# Patient Record
Sex: MALE | Race: WHITE | NOT HISPANIC OR LATINO | Employment: FULL TIME | URBAN - NONMETROPOLITAN AREA
[De-identification: names, ages, dates, MRNs, and addresses within clinical notes are randomized per-mention and may not be internally consistent; named-entity substitution may affect disease eponyms.]

---

## 2022-01-11 ENCOUNTER — APPOINTMENT (OUTPATIENT)
Dept: GENERAL RADIOLOGY | Facility: HOSPITAL | Age: 52
End: 2022-01-11

## 2022-01-11 ENCOUNTER — HOSPITAL ENCOUNTER (INPATIENT)
Facility: HOSPITAL | Age: 52
LOS: 29 days | Discharge: NURSING, REHAB OR LONG-TERM CARE | End: 2022-02-09
Attending: INTERNAL MEDICINE | Admitting: INTERNAL MEDICINE

## 2022-01-11 DIAGNOSIS — U07.1 ACUTE RESPIRATORY DISTRESS SYNDROME (ARDS) DUE TO 2019 NOVEL CORONAVIRUS: ICD-10-CM

## 2022-01-11 DIAGNOSIS — A41.9 SHOCK, SEPTIC: Primary | ICD-10-CM

## 2022-01-11 DIAGNOSIS — U07.1 PNEUMONIA DUE TO COVID-19 VIRUS: ICD-10-CM

## 2022-01-11 DIAGNOSIS — J80 ACUTE RESPIRATORY DISTRESS SYNDROME (ARDS) DUE TO 2019 NOVEL CORONAVIRUS: ICD-10-CM

## 2022-01-11 DIAGNOSIS — Z99.11 VENTILATOR DEPENDENT: ICD-10-CM

## 2022-01-11 DIAGNOSIS — J12.82 PNEUMONIA DUE TO COVID-19 VIRUS: ICD-10-CM

## 2022-01-11 DIAGNOSIS — R65.21 SHOCK, SEPTIC: Primary | ICD-10-CM

## 2022-01-11 DIAGNOSIS — Z98.890 HX OF TRACHEOSTOMY: ICD-10-CM

## 2022-01-11 DIAGNOSIS — I26.99 ACUTE PULMONARY EMBOLISM WITHOUT ACUTE COR PULMONALE, UNSPECIFIED PULMONARY EMBOLISM TYPE: ICD-10-CM

## 2022-01-11 DIAGNOSIS — E43 SEVERE MALNUTRITION: ICD-10-CM

## 2022-01-11 PROBLEM — E66.9 OBESITY (BMI 30-39.9): Status: ACTIVE | Noted: 2022-01-11

## 2022-01-11 PROBLEM — J96.01 ACUTE HYPOXEMIC RESPIRATORY FAILURE: Status: ACTIVE | Noted: 2022-01-11

## 2022-01-11 LAB
ALBUMIN SERPL-MCNC: 2.9 G/DL (ref 3.5–5.2)
ALBUMIN/GLOB SERPL: 0.6 G/DL
ALP SERPL-CCNC: 95 U/L (ref 39–117)
ALT SERPL W P-5'-P-CCNC: 35 U/L (ref 1–41)
AMPHET+METHAMPHET UR QL: NEGATIVE
AMPHETAMINES UR QL: NEGATIVE
ANION GAP SERPL CALCULATED.3IONS-SCNC: 9 MMOL/L (ref 5–15)
ARTERIAL PATENCY WRIST A: ABNORMAL
ARTERIAL PATENCY WRIST A: ABNORMAL
AST SERPL-CCNC: 40 U/L (ref 1–40)
ATMOSPHERIC PRESS: 758 MMHG
ATMOSPHERIC PRESS: 759 MMHG
B PARAPERT DNA SPEC QL NAA+PROBE: NOT DETECTED
B PERT DNA SPEC QL NAA+PROBE: NOT DETECTED
BACTERIA UR QL AUTO: ABNORMAL /HPF
BARBITURATES UR QL SCN: NEGATIVE
BASE EXCESS BLDA CALC-SCNC: -4.2 MMOL/L (ref 0–2)
BASE EXCESS BLDA CALC-SCNC: -5.3 MMOL/L (ref 0–2)
BASOPHILS # BLD AUTO: 0.06 10*3/MM3 (ref 0–0.2)
BASOPHILS NFR BLD AUTO: 0.2 % (ref 0–1.5)
BDY SITE: ABNORMAL
BDY SITE: ABNORMAL
BENZODIAZ UR QL SCN: POSITIVE
BILIRUB SERPL-MCNC: 1.5 MG/DL (ref 0–1.2)
BILIRUB UR QL STRIP: NEGATIVE
BODY TEMPERATURE: 37 C
BODY TEMPERATURE: 37 C
BUN SERPL-MCNC: 22 MG/DL (ref 6–20)
BUN/CREAT SERPL: 18.3 (ref 7–25)
BUPRENORPHINE SERPL-MCNC: NEGATIVE NG/ML
C PNEUM DNA NPH QL NAA+NON-PROBE: NOT DETECTED
CALCIUM SPEC-SCNC: 8.2 MG/DL (ref 8.6–10.5)
CANNABINOIDS SERPL QL: NEGATIVE
CHLORIDE SERPL-SCNC: 99 MMOL/L (ref 98–107)
CK SERPL-CCNC: 102 U/L (ref 20–200)
CLARITY UR: CLEAR
CO2 SERPL-SCNC: 24 MMOL/L (ref 22–29)
COCAINE UR QL: NEGATIVE
COLOR UR: YELLOW
CREAT SERPL-MCNC: 1.2 MG/DL (ref 0.76–1.27)
CRP SERPL-MCNC: 29.82 MG/DL (ref 0–0.5)
D-LACTATE SERPL-SCNC: 1 MMOL/L (ref 0.5–2)
DEPRECATED RDW RBC AUTO: 46.7 FL (ref 37–54)
EOSINOPHIL # BLD AUTO: 0.01 10*3/MM3 (ref 0–0.4)
EOSINOPHIL NFR BLD AUTO: 0 % (ref 0.3–6.2)
ERYTHROCYTE [DISTWIDTH] IN BLOOD BY AUTOMATED COUNT: 15.8 % (ref 12.3–15.4)
FLUAV SUBTYP SPEC NAA+PROBE: NOT DETECTED
FLUBV RNA ISLT QL NAA+PROBE: NOT DETECTED
GFR SERPL CREATININE-BSD FRML MDRD: 64 ML/MIN/1.73
GLOBULIN UR ELPH-MCNC: 4.6 GM/DL
GLUCOSE SERPL-MCNC: 151 MG/DL (ref 65–99)
GLUCOSE UR STRIP-MCNC: NEGATIVE MG/DL
HADV DNA SPEC NAA+PROBE: NOT DETECTED
HCO3 BLDA-SCNC: 24.3 MMOL/L (ref 20–26)
HCO3 BLDA-SCNC: 25.3 MMOL/L (ref 20–26)
HCOV 229E RNA SPEC QL NAA+PROBE: NOT DETECTED
HCOV HKU1 RNA SPEC QL NAA+PROBE: NOT DETECTED
HCOV NL63 RNA SPEC QL NAA+PROBE: NOT DETECTED
HCOV OC43 RNA SPEC QL NAA+PROBE: NOT DETECTED
HCT VFR BLD AUTO: 38.4 % (ref 37.5–51)
HGB BLD-MCNC: 11.7 G/DL (ref 13–17.7)
HGB UR QL STRIP.AUTO: NEGATIVE
HMPV RNA NPH QL NAA+NON-PROBE: NOT DETECTED
HPIV1 RNA ISLT QL NAA+PROBE: NOT DETECTED
HPIV2 RNA SPEC QL NAA+PROBE: NOT DETECTED
HPIV3 RNA NPH QL NAA+PROBE: NOT DETECTED
HPIV4 P GENE NPH QL NAA+PROBE: NOT DETECTED
HYALINE CASTS UR QL AUTO: ABNORMAL /LPF
IMM GRANULOCYTES # BLD AUTO: 0.83 10*3/MM3 (ref 0–0.05)
IMM GRANULOCYTES NFR BLD AUTO: 3.1 % (ref 0–0.5)
INHALED O2 CONCENTRATION: 60 %
INHALED O2 CONCENTRATION: 80 %
KETONES UR QL STRIP: NEGATIVE
L PNEUMO1 AG UR QL IA: NEGATIVE
LEUKOCYTE ESTERASE UR QL STRIP.AUTO: NEGATIVE
LIPASE SERPL-CCNC: 29 U/L (ref 13–60)
LYMPHOCYTES # BLD AUTO: 1.73 10*3/MM3 (ref 0.7–3.1)
LYMPHOCYTES NFR BLD AUTO: 6.4 % (ref 19.6–45.3)
Lab: ABNORMAL
M PNEUMO IGG SER IA-ACNC: NOT DETECTED
MAGNESIUM SERPL-MCNC: 2.3 MG/DL (ref 1.6–2.6)
MCH RBC QN AUTO: 25.1 PG (ref 26.6–33)
MCHC RBC AUTO-ENTMCNC: 30.5 G/DL (ref 31.5–35.7)
MCV RBC AUTO: 82.4 FL (ref 79–97)
METHADONE UR QL SCN: NEGATIVE
MODALITY: ABNORMAL
MODALITY: ABNORMAL
MONOCYTES # BLD AUTO: 1.34 10*3/MM3 (ref 0.1–0.9)
MONOCYTES NFR BLD AUTO: 5 % (ref 5–12)
MRSA DNA SPEC QL NAA+PROBE: NORMAL
NEUTROPHILS NFR BLD AUTO: 22.9 10*3/MM3 (ref 1.7–7)
NEUTROPHILS NFR BLD AUTO: 85.3 % (ref 42.7–76)
NITRITE UR QL STRIP: NEGATIVE
NOTIFIED BY: ABNORMAL
NOTIFIED BY: ABNORMAL
NOTIFIED WHO: ABNORMAL
NOTIFIED WHO: ABNORMAL
NRBC BLD AUTO-RTO: 0 /100 WBC (ref 0–0.2)
NT-PROBNP SERPL-MCNC: 1250 PG/ML (ref 0–450)
OPIATES UR QL: NEGATIVE
OXYCODONE UR QL SCN: NEGATIVE
PCO2 BLDA: 67.9 MM HG (ref 35–45)
PCO2 BLDA: 68 MM HG (ref 35–45)
PCO2 TEMP ADJ BLD: 67.9 MM HG (ref 35–45)
PCO2 TEMP ADJ BLD: 68 MM HG (ref 35–45)
PCP UR QL SCN: NEGATIVE
PEEP RESPIRATORY: 12 CM[H2O]
PEEP RESPIRATORY: 16 CM[H2O]
PH BLDA: 7.16 PH UNITS (ref 7.35–7.45)
PH BLDA: 7.18 PH UNITS (ref 7.35–7.45)
PH UR STRIP.AUTO: 5.5 [PH] (ref 5–8)
PH, TEMP CORRECTED: 7.16 PH UNITS (ref 7.35–7.45)
PH, TEMP CORRECTED: 7.18 PH UNITS (ref 7.35–7.45)
PHOSPHATE SERPL-MCNC: 6 MG/DL (ref 2.5–4.5)
PLATELET # BLD AUTO: 448 10*3/MM3 (ref 140–450)
PMV BLD AUTO: 9.7 FL (ref 6–12)
PO2 BLDA: 129 MM HG (ref 83–108)
PO2 BLDA: 74.6 MM HG (ref 83–108)
PO2 TEMP ADJ BLD: 129 MM HG (ref 83–108)
PO2 TEMP ADJ BLD: 74.6 MM HG (ref 83–108)
POTASSIUM SERPL-SCNC: 5.6 MMOL/L (ref 3.5–5.2)
PROCALCITONIN SERPL-MCNC: 0.86 NG/ML (ref 0–0.25)
PROPOXYPH UR QL: NEGATIVE
PROT SERPL-MCNC: 7.5 G/DL (ref 6–8.5)
PROT UR QL STRIP: ABNORMAL
RBC # BLD AUTO: 4.66 10*6/MM3 (ref 4.14–5.8)
RBC # UR STRIP: ABNORMAL /HPF
REF LAB TEST METHOD: ABNORMAL
RHINOVIRUS RNA SPEC NAA+PROBE: NOT DETECTED
RSV RNA NPH QL NAA+NON-PROBE: NOT DETECTED
S PNEUM AG SPEC QL LA: NEGATIVE
SAO2 % BLDCOA: 91.1 % (ref 94–99)
SAO2 % BLDCOA: 98 % (ref 94–99)
SARS-COV-2 RNA NPH QL NAA+NON-PROBE: DETECTED
SET MECH RESP RATE: 26
SET MECH RESP RATE: 30
SODIUM SERPL-SCNC: 132 MMOL/L (ref 136–145)
SODIUM UR-SCNC: 62 MMOL/L
SP GR UR STRIP: 1.02 (ref 1–1.03)
SQUAMOUS #/AREA URNS HPF: ABNORMAL /HPF
TRICYCLICS UR QL SCN: NEGATIVE
TROPONIN T SERPL-MCNC: 0.02 NG/ML (ref 0–0.03)
TSH SERPL DL<=0.05 MIU/L-ACNC: 0.43 UIU/ML (ref 0.27–4.2)
UROBILINOGEN UR QL STRIP: ABNORMAL
VENTILATOR MODE: AC
VENTILATOR MODE: AC
VT ON VENT VENT: 530 ML
VT ON VENT VENT: 550 ML
WBC # UR STRIP: ABNORMAL /HPF
WBC NRBC COR # BLD: 26.87 10*3/MM3 (ref 3.4–10.8)

## 2022-01-11 PROCEDURE — 0202U NFCT DS 22 TRGT SARS-COV-2: CPT | Performed by: INTERNAL MEDICINE

## 2022-01-11 PROCEDURE — 94799 UNLISTED PULMONARY SVC/PX: CPT

## 2022-01-11 PROCEDURE — 4A133B1 MONITORING OF ARTERIAL PRESSURE, PERIPHERAL, PERCUTANEOUS APPROACH: ICD-10-PCS | Performed by: INTERNAL MEDICINE

## 2022-01-11 PROCEDURE — 03HY32Z INSERTION OF MONITORING DEVICE INTO UPPER ARTERY, PERCUTANEOUS APPROACH: ICD-10-PCS | Performed by: INTERNAL MEDICINE

## 2022-01-11 PROCEDURE — 25010000002 MIDAZOLAM PER 1 MG

## 2022-01-11 PROCEDURE — 82550 ASSAY OF CK (CPK): CPT | Performed by: INTERNAL MEDICINE

## 2022-01-11 PROCEDURE — 84540 ASSAY OF URINE/UREA-N: CPT | Performed by: INTERNAL MEDICINE

## 2022-01-11 PROCEDURE — 84443 ASSAY THYROID STIM HORMONE: CPT | Performed by: INTERNAL MEDICINE

## 2022-01-11 PROCEDURE — 84100 ASSAY OF PHOSPHORUS: CPT | Performed by: INTERNAL MEDICINE

## 2022-01-11 PROCEDURE — 83880 ASSAY OF NATRIURETIC PEPTIDE: CPT | Performed by: INTERNAL MEDICINE

## 2022-01-11 PROCEDURE — B548ZZA ULTRASONOGRAPHY OF SUPERIOR VENA CAVA, GUIDANCE: ICD-10-PCS | Performed by: INTERNAL MEDICINE

## 2022-01-11 PROCEDURE — 25010000002 PROPOFOL 10 MG/ML EMULSION: Performed by: INTERNAL MEDICINE

## 2022-01-11 PROCEDURE — 85025 COMPLETE CBC W/AUTO DIFF WBC: CPT | Performed by: INTERNAL MEDICINE

## 2022-01-11 PROCEDURE — 80053 COMPREHEN METABOLIC PANEL: CPT | Performed by: INTERNAL MEDICINE

## 2022-01-11 PROCEDURE — 81001 URINALYSIS AUTO W/SCOPE: CPT | Performed by: INTERNAL MEDICINE

## 2022-01-11 PROCEDURE — 83690 ASSAY OF LIPASE: CPT | Performed by: INTERNAL MEDICINE

## 2022-01-11 PROCEDURE — 87641 MR-STAPH DNA AMP PROBE: CPT | Performed by: INTERNAL MEDICINE

## 2022-01-11 PROCEDURE — 25010000002 PIPERACILLIN SOD-TAZOBACTAM PER 1 G: Performed by: INTERNAL MEDICINE

## 2022-01-11 PROCEDURE — 83605 ASSAY OF LACTIC ACID: CPT | Performed by: INTERNAL MEDICINE

## 2022-01-11 PROCEDURE — 80306 DRUG TEST PRSMV INSTRMNT: CPT | Performed by: INTERNAL MEDICINE

## 2022-01-11 PROCEDURE — 25010000002 PROPOFOL 1000 MG/100ML EMULSION

## 2022-01-11 PROCEDURE — 82570 ASSAY OF URINE CREATININE: CPT | Performed by: INTERNAL MEDICINE

## 2022-01-11 PROCEDURE — 71045 X-RAY EXAM CHEST 1 VIEW: CPT

## 2022-01-11 PROCEDURE — 99254 IP/OBS CNSLTJ NEW/EST MOD 60: CPT | Performed by: INTERNAL MEDICINE

## 2022-01-11 PROCEDURE — 84145 PROCALCITONIN (PCT): CPT | Performed by: INTERNAL MEDICINE

## 2022-01-11 PROCEDURE — 02HV33Z INSERTION OF INFUSION DEVICE INTO SUPERIOR VENA CAVA, PERCUTANEOUS APPROACH: ICD-10-PCS | Performed by: INTERNAL MEDICINE

## 2022-01-11 PROCEDURE — 87040 BLOOD CULTURE FOR BACTERIA: CPT | Performed by: INTERNAL MEDICINE

## 2022-01-11 PROCEDURE — 87086 URINE CULTURE/COLONY COUNT: CPT | Performed by: INTERNAL MEDICINE

## 2022-01-11 PROCEDURE — 84300 ASSAY OF URINE SODIUM: CPT | Performed by: INTERNAL MEDICINE

## 2022-01-11 PROCEDURE — 4A133J1 MONITORING OF ARTERIAL PULSE, PERIPHERAL, PERCUTANEOUS APPROACH: ICD-10-PCS | Performed by: INTERNAL MEDICINE

## 2022-01-11 PROCEDURE — 5A1955Z RESPIRATORY VENTILATION, GREATER THAN 96 CONSECUTIVE HOURS: ICD-10-PCS | Performed by: INTERNAL MEDICINE

## 2022-01-11 PROCEDURE — 87899 AGENT NOS ASSAY W/OPTIC: CPT | Performed by: INTERNAL MEDICINE

## 2022-01-11 PROCEDURE — C1751 CATH, INF, PER/CENT/MIDLINE: HCPCS

## 2022-01-11 PROCEDURE — 25010000002 FUROSEMIDE PER 20 MG: Performed by: INTERNAL MEDICINE

## 2022-01-11 PROCEDURE — 86140 C-REACTIVE PROTEIN: CPT | Performed by: INTERNAL MEDICINE

## 2022-01-11 PROCEDURE — 94640 AIRWAY INHALATION TREATMENT: CPT

## 2022-01-11 PROCEDURE — 82803 BLOOD GASES ANY COMBINATION: CPT

## 2022-01-11 PROCEDURE — 83735 ASSAY OF MAGNESIUM: CPT | Performed by: INTERNAL MEDICINE

## 2022-01-11 PROCEDURE — 74018 RADEX ABDOMEN 1 VIEW: CPT

## 2022-01-11 PROCEDURE — 84484 ASSAY OF TROPONIN QUANT: CPT | Performed by: INTERNAL MEDICINE

## 2022-01-11 PROCEDURE — 94002 VENT MGMT INPAT INIT DAY: CPT

## 2022-01-11 RX ORDER — ATORVASTATIN CALCIUM 10 MG/1
20 TABLET, FILM COATED ORAL NIGHTLY
Status: DISCONTINUED | OUTPATIENT
Start: 2022-01-11 | End: 2022-01-11

## 2022-01-11 RX ORDER — SODIUM CHLORIDE 0.9 % (FLUSH) 0.9 %
10 SYRINGE (ML) INJECTION AS NEEDED
Status: DISCONTINUED | OUTPATIENT
Start: 2022-01-11 | End: 2022-02-09 | Stop reason: HOSPADM

## 2022-01-11 RX ORDER — ONDANSETRON 2 MG/ML
4 INJECTION INTRAMUSCULAR; INTRAVENOUS EVERY 6 HOURS PRN
Status: DISCONTINUED | OUTPATIENT
Start: 2022-01-11 | End: 2022-02-09 | Stop reason: HOSPADM

## 2022-01-11 RX ORDER — NOREPINEPHRINE BIT/0.9 % NACL 8 MG/250ML
INFUSION BOTTLE (ML) INTRAVENOUS
Status: COMPLETED
Start: 2022-01-11 | End: 2022-01-11

## 2022-01-11 RX ORDER — MIDAZOLAM HYDROCHLORIDE 1 MG/ML
4 INJECTION INTRAMUSCULAR; INTRAVENOUS ONCE
Status: COMPLETED | OUTPATIENT
Start: 2022-01-11 | End: 2022-01-11

## 2022-01-11 RX ORDER — ZINC SULFATE 50(220)MG
220 CAPSULE ORAL DAILY
Status: DISCONTINUED | OUTPATIENT
Start: 2022-01-11 | End: 2022-01-28

## 2022-01-11 RX ORDER — ALBUTEROL SULFATE 90 UG/1
2 AEROSOL, METERED RESPIRATORY (INHALATION)
Status: DISCONTINUED | OUTPATIENT
Start: 2022-01-11 | End: 2022-01-13

## 2022-01-11 RX ORDER — DEXTROMETHORPHAN POLISTIREX 30 MG/5ML
60 SUSPENSION ORAL EVERY 12 HOURS PRN
Status: DISCONTINUED | OUTPATIENT
Start: 2022-01-11 | End: 2022-01-11

## 2022-01-11 RX ORDER — NOREPINEPHRINE BIT/0.9 % NACL 8 MG/250ML
.02-.3 INFUSION BOTTLE (ML) INTRAVENOUS
Status: DISCONTINUED | OUTPATIENT
Start: 2022-01-11 | End: 2022-01-13

## 2022-01-11 RX ORDER — MORPHINE SULFATE 2 MG/ML
2 INJECTION, SOLUTION INTRAMUSCULAR; INTRAVENOUS
Status: DISCONTINUED | OUTPATIENT
Start: 2022-01-11 | End: 2022-01-15

## 2022-01-11 RX ORDER — ASCORBIC ACID 500 MG
500 TABLET ORAL DAILY
Status: DISCONTINUED | OUTPATIENT
Start: 2022-01-11 | End: 2022-01-11

## 2022-01-11 RX ORDER — SODIUM CHLORIDE 0.9 % (FLUSH) 0.9 %
10 SYRINGE (ML) INJECTION EVERY 12 HOURS SCHEDULED
Status: DISCONTINUED | OUTPATIENT
Start: 2022-01-11 | End: 2022-01-11

## 2022-01-11 RX ORDER — BENZONATATE 100 MG/1
200 CAPSULE ORAL 3 TIMES DAILY PRN
Status: DISCONTINUED | OUTPATIENT
Start: 2022-01-11 | End: 2022-01-28

## 2022-01-11 RX ORDER — DEXTROMETHORPHAN POLISTIREX 30 MG/5ML
60 SUSPENSION ORAL EVERY 12 HOURS PRN
Status: DISCONTINUED | OUTPATIENT
Start: 2022-01-11 | End: 2022-01-28

## 2022-01-11 RX ORDER — ALBUTEROL SULFATE 90 UG/1
2 AEROSOL, METERED RESPIRATORY (INHALATION)
Status: DISCONTINUED | OUTPATIENT
Start: 2022-01-11 | End: 2022-01-11

## 2022-01-11 RX ORDER — FUROSEMIDE 10 MG/ML
40 INJECTION INTRAMUSCULAR; INTRAVENOUS ONCE
Status: COMPLETED | OUTPATIENT
Start: 2022-01-11 | End: 2022-01-11

## 2022-01-11 RX ORDER — ASCORBIC ACID 500 MG
500 TABLET ORAL DAILY
Status: DISCONTINUED | OUTPATIENT
Start: 2022-01-11 | End: 2022-01-28

## 2022-01-11 RX ORDER — BUDESONIDE AND FORMOTEROL FUMARATE DIHYDRATE 160; 4.5 UG/1; UG/1
2 AEROSOL RESPIRATORY (INHALATION)
Status: DISCONTINUED | OUTPATIENT
Start: 2022-01-11 | End: 2022-02-09 | Stop reason: HOSPADM

## 2022-01-11 RX ORDER — ZINC SULFATE 50(220)MG
220 CAPSULE ORAL DAILY
Status: DISCONTINUED | OUTPATIENT
Start: 2022-01-11 | End: 2022-01-11

## 2022-01-11 RX ORDER — FAMOTIDINE 10 MG/ML
20 INJECTION, SOLUTION INTRAVENOUS EVERY 12 HOURS SCHEDULED
Status: DISCONTINUED | OUTPATIENT
Start: 2022-01-11 | End: 2022-01-16

## 2022-01-11 RX ORDER — PROPOFOL 10 MG/ML
INJECTION, EMULSION INTRAVENOUS
Status: COMPLETED
Start: 2022-01-11 | End: 2022-01-11

## 2022-01-11 RX ORDER — ONDANSETRON 4 MG/1
4 TABLET, FILM COATED ORAL EVERY 6 HOURS PRN
Status: DISCONTINUED | OUTPATIENT
Start: 2022-01-11 | End: 2022-02-09 | Stop reason: HOSPADM

## 2022-01-11 RX ORDER — BENZONATATE 100 MG/1
200 CAPSULE ORAL 3 TIMES DAILY PRN
Status: DISCONTINUED | OUTPATIENT
Start: 2022-01-11 | End: 2022-01-11

## 2022-01-11 RX ORDER — MIDAZOLAM HYDROCHLORIDE 1 MG/ML
INJECTION INTRAMUSCULAR; INTRAVENOUS
Status: COMPLETED
Start: 2022-01-11 | End: 2022-01-11

## 2022-01-11 RX ORDER — ATORVASTATIN CALCIUM 10 MG/1
20 TABLET, FILM COATED ORAL NIGHTLY
Status: DISCONTINUED | OUTPATIENT
Start: 2022-01-11 | End: 2022-01-28

## 2022-01-11 RX ADMIN — Medication 0.16 MCG/KG/MIN: at 18:30

## 2022-01-11 RX ADMIN — THIAMINE HCL TAB 100 MG 100 MG: 100 TAB at 20:33

## 2022-01-11 RX ADMIN — ATORVASTATIN CALCIUM 20 MG: 10 TABLET, FILM COATED ORAL at 21:00

## 2022-01-11 RX ADMIN — FAMOTIDINE 20 MG: 10 INJECTION INTRAVENOUS at 20:33

## 2022-01-11 RX ADMIN — PROPOFOL 50 MCG/KG/MIN: 10 INJECTION, EMULSION INTRAVENOUS at 20:36

## 2022-01-11 RX ADMIN — MIDAZOLAM 4 MG: 1 INJECTION INTRAMUSCULAR; INTRAVENOUS at 18:46

## 2022-01-11 RX ADMIN — PROPOFOL 50 MCG/KG/MIN: 10 INJECTION, EMULSION INTRAVENOUS at 18:30

## 2022-01-11 RX ADMIN — MIDAZOLAM HYDROCHLORIDE 4 MG: 1 INJECTION INTRAMUSCULAR; INTRAVENOUS at 18:46

## 2022-01-11 RX ADMIN — PROPOFOL 50 MCG/KG/MIN: 10 INJECTION, EMULSION INTRAVENOUS at 23:59

## 2022-01-11 RX ADMIN — TAZOBACTAM SODIUM AND PIPERACILLIN SODIUM 4.5 G: 500; 4 INJECTION, SOLUTION INTRAVENOUS at 23:59

## 2022-01-11 RX ADMIN — FUROSEMIDE 40 MG: 10 INJECTION, SOLUTION INTRAVENOUS at 20:33

## 2022-01-11 RX ADMIN — OXYCODONE HYDROCHLORIDE AND ACETAMINOPHEN 500 MG: 500 TABLET ORAL at 23:50

## 2022-01-11 RX ADMIN — KETAMINE HYDROCHLORIDE 1 MCG/KG/MIN: 50 INJECTION INTRAMUSCULAR; INTRAVENOUS at 19:08

## 2022-01-11 RX ADMIN — ALBUTEROL SULFATE 2 PUFF: 90 AEROSOL, METERED RESPIRATORY (INHALATION) at 20:55

## 2022-01-11 RX ADMIN — BUDESONIDE AND FORMOTEROL FUMARATE DIHYDRATE 2 PUFF: 160; 4.5 AEROSOL RESPIRATORY (INHALATION) at 21:11

## 2022-01-11 RX ADMIN — Medication 0.18 MCG/KG/MIN: at 23:58

## 2022-01-11 RX ADMIN — ZINC SULFATE 220 MG (50 MG) CAPSULE 220 MG: CAPSULE at 20:33

## 2022-01-12 ENCOUNTER — APPOINTMENT (OUTPATIENT)
Dept: CARDIOLOGY | Facility: HOSPITAL | Age: 52
End: 2022-01-12

## 2022-01-12 ENCOUNTER — APPOINTMENT (OUTPATIENT)
Dept: GENERAL RADIOLOGY | Facility: HOSPITAL | Age: 52
End: 2022-01-12

## 2022-01-12 LAB
ARTERIAL PATENCY WRIST A: ABNORMAL
ARTERIAL PATENCY WRIST A: ABNORMAL
ATMOSPHERIC PRESS: 754 MMHG
ATMOSPHERIC PRESS: 757 MMHG
BACTERIA SPEC AEROBE CULT: NO GROWTH
BASE EXCESS BLDA CALC-SCNC: -0.6 MMOL/L (ref 0–2)
BASE EXCESS BLDA CALC-SCNC: 0.9 MMOL/L (ref 0–2)
BASOPHILS # BLD AUTO: 0.04 10*3/MM3 (ref 0–0.2)
BASOPHILS NFR BLD AUTO: 0.2 % (ref 0–1.5)
BDY SITE: ABNORMAL
BDY SITE: ABNORMAL
BODY TEMPERATURE: 37 C
BODY TEMPERATURE: 37 C
CREAT UR-MCNC: 111.4 MG/DL
CRP SERPL-MCNC: 32.92 MG/DL (ref 0–0.5)
D DIMER PPP FEU-MCNC: 4.16 MG/L (FEU) (ref 0–0.5)
DEPRECATED RDW RBC AUTO: 48.1 FL (ref 37–54)
EOSINOPHIL # BLD AUTO: 0.02 10*3/MM3 (ref 0–0.4)
EOSINOPHIL NFR BLD AUTO: 0.1 % (ref 0.3–6.2)
ERYTHROCYTE [DISTWIDTH] IN BLOOD BY AUTOMATED COUNT: 15.8 % (ref 12.3–15.4)
FERRITIN SERPL-MCNC: 566.7 NG/ML (ref 30–400)
GLUCOSE BLDC GLUCOMTR-MCNC: 146 MG/DL (ref 70–130)
HCO3 BLDA-SCNC: 26.1 MMOL/L (ref 20–26)
HCO3 BLDA-SCNC: 28.9 MMOL/L (ref 20–26)
HCT VFR BLD AUTO: 38.3 % (ref 37.5–51)
HGB BLD-MCNC: 11.6 G/DL (ref 13–17.7)
IMM GRANULOCYTES # BLD AUTO: 0.35 10*3/MM3 (ref 0–0.05)
IMM GRANULOCYTES NFR BLD AUTO: 1.6 % (ref 0–0.5)
INHALED O2 CONCENTRATION: 50 %
INHALED O2 CONCENTRATION: 60 %
LYMPHOCYTES # BLD AUTO: 1.41 10*3/MM3 (ref 0.7–3.1)
LYMPHOCYTES NFR BLD AUTO: 6.3 % (ref 19.6–45.3)
Lab: ABNORMAL
MCH RBC QN AUTO: 25.4 PG (ref 26.6–33)
MCHC RBC AUTO-ENTMCNC: 30.3 G/DL (ref 31.5–35.7)
MCV RBC AUTO: 84 FL (ref 79–97)
MODALITY: ABNORMAL
MODALITY: ABNORMAL
MONOCYTES # BLD AUTO: 0.78 10*3/MM3 (ref 0.1–0.9)
MONOCYTES NFR BLD AUTO: 3.5 % (ref 5–12)
NEUTROPHILS NFR BLD AUTO: 19.85 10*3/MM3 (ref 1.7–7)
NEUTROPHILS NFR BLD AUTO: 88.3 % (ref 42.7–76)
NOTIFIED BY: ABNORMAL
NOTIFIED WHO: ABNORMAL
NRBC BLD AUTO-RTO: 0 /100 WBC (ref 0–0.2)
PAW @ PEAK INSP FLOW SETTING VENT: 18 CMH2O
PCO2 BLDA: 43.6 MM HG (ref 35–45)
PCO2 BLDA: 73 MM HG (ref 35–45)
PCO2 TEMP ADJ BLD: 43.6 MM HG (ref 35–45)
PCO2 TEMP ADJ BLD: 73 MM HG (ref 35–45)
PEEP RESPIRATORY: 12 CM[H2O]
PEEP RESPIRATORY: 12 CM[H2O]
PH BLDA: 7.21 PH UNITS (ref 7.35–7.45)
PH BLDA: 7.39 PH UNITS (ref 7.35–7.45)
PH, TEMP CORRECTED: 7.21 PH UNITS (ref 7.35–7.45)
PH, TEMP CORRECTED: 7.39 PH UNITS (ref 7.35–7.45)
PLATELET # BLD AUTO: 403 10*3/MM3 (ref 140–450)
PMV BLD AUTO: 10 FL (ref 6–12)
PO2 BLDA: 149 MM HG (ref 83–108)
PO2 BLDA: 87.8 MM HG (ref 83–108)
PO2 TEMP ADJ BLD: 149 MM HG (ref 83–108)
PO2 TEMP ADJ BLD: 87.8 MM HG (ref 83–108)
PROCALCITONIN SERPL-MCNC: 1.09 NG/ML (ref 0–0.25)
RBC # BLD AUTO: 4.56 10*6/MM3 (ref 4.14–5.8)
SAO2 % BLDCOA: 95.6 % (ref 94–99)
SAO2 % BLDCOA: 99.9 % (ref 94–99)
SET MECH RESP RATE: 34
SET MECH RESP RATE: 34
UUN 24H UR-MCNC: 685 MG/DL
VENTILATOR MODE: ABNORMAL
VENTILATOR MODE: AC
VT ON VENT VENT: 480 ML
WBC NRBC COR # BLD: 22.45 10*3/MM3 (ref 3.4–10.8)

## 2022-01-12 PROCEDURE — 94799 UNLISTED PULMONARY SVC/PX: CPT

## 2022-01-12 PROCEDURE — 25010000002 PROPOFOL 10 MG/ML EMULSION: Performed by: INTERNAL MEDICINE

## 2022-01-12 PROCEDURE — 82803 BLOOD GASES ANY COMBINATION: CPT

## 2022-01-12 PROCEDURE — 63710000001 DEXAMETHASONE PER 0.25 MG: Performed by: INTERNAL MEDICINE

## 2022-01-12 PROCEDURE — 25010000002 MORPHINE SULFATE (PF) 2 MG/ML SOLUTION: Performed by: INTERNAL MEDICINE

## 2022-01-12 PROCEDURE — 94003 VENT MGMT INPAT SUBQ DAY: CPT

## 2022-01-12 PROCEDURE — 85025 COMPLETE CBC W/AUTO DIFF WBC: CPT | Performed by: INTERNAL MEDICINE

## 2022-01-12 PROCEDURE — 93306 TTE W/DOPPLER COMPLETE: CPT | Performed by: INTERNAL MEDICINE

## 2022-01-12 PROCEDURE — 82728 ASSAY OF FERRITIN: CPT | Performed by: INTERNAL MEDICINE

## 2022-01-12 PROCEDURE — 93306 TTE W/DOPPLER COMPLETE: CPT

## 2022-01-12 PROCEDURE — 84145 PROCALCITONIN (PCT): CPT | Performed by: INTERNAL MEDICINE

## 2022-01-12 PROCEDURE — 85379 FIBRIN DEGRADATION QUANT: CPT | Performed by: INTERNAL MEDICINE

## 2022-01-12 PROCEDURE — 25010000002 ENOXAPARIN PER 10 MG: Performed by: INTERNAL MEDICINE

## 2022-01-12 PROCEDURE — 25010000002 PIPERACILLIN SOD-TAZOBACTAM PER 1 G: Performed by: INTERNAL MEDICINE

## 2022-01-12 PROCEDURE — 87070 CULTURE OTHR SPECIMN AEROBIC: CPT | Performed by: INTERNAL MEDICINE

## 2022-01-12 PROCEDURE — 87205 SMEAR GRAM STAIN: CPT | Performed by: INTERNAL MEDICINE

## 2022-01-12 PROCEDURE — 71045 X-RAY EXAM CHEST 1 VIEW: CPT

## 2022-01-12 PROCEDURE — 86140 C-REACTIVE PROTEIN: CPT | Performed by: INTERNAL MEDICINE

## 2022-01-12 PROCEDURE — 82962 GLUCOSE BLOOD TEST: CPT

## 2022-01-12 PROCEDURE — 99233 SBSQ HOSP IP/OBS HIGH 50: CPT | Performed by: INTERNAL MEDICINE

## 2022-01-12 RX ORDER — ROCURONIUM BROMIDE 10 MG/ML
100 INJECTION, SOLUTION INTRAVENOUS ONCE
Status: COMPLETED | OUTPATIENT
Start: 2022-01-12 | End: 2022-01-12

## 2022-01-12 RX ORDER — ACETAMINOPHEN 160 MG/5ML
650 SOLUTION ORAL EVERY 6 HOURS PRN
Status: DISCONTINUED | OUTPATIENT
Start: 2022-01-12 | End: 2022-01-28

## 2022-01-12 RX ORDER — ACETAMINOPHEN 650 MG/1
650 SUPPOSITORY RECTAL EVERY 6 HOURS PRN
Status: DISCONTINUED | OUTPATIENT
Start: 2022-01-12 | End: 2022-02-09 | Stop reason: HOSPADM

## 2022-01-12 RX ADMIN — ACETAMINOPHEN 650 MG: 650 SUPPOSITORY RECTAL at 08:05

## 2022-01-12 RX ADMIN — BUDESONIDE AND FORMOTEROL FUMARATE DIHYDRATE 2 PUFF: 160; 4.5 AEROSOL RESPIRATORY (INHALATION) at 18:09

## 2022-01-12 RX ADMIN — OXYCODONE HYDROCHLORIDE AND ACETAMINOPHEN 500 MG: 500 TABLET ORAL at 08:05

## 2022-01-12 RX ADMIN — MORPHINE SULFATE 2 MG: 2 INJECTION, SOLUTION INTRAMUSCULAR; INTRAVENOUS at 02:56

## 2022-01-12 RX ADMIN — ENOXAPARIN SODIUM 110 MG: 120 INJECTION SUBCUTANEOUS at 03:06

## 2022-01-12 RX ADMIN — TAZOBACTAM SODIUM AND PIPERACILLIN SODIUM 4.5 G: 500; 4 INJECTION, SOLUTION INTRAVENOUS at 14:52

## 2022-01-12 RX ADMIN — MORPHINE SULFATE 2 MG: 2 INJECTION, SOLUTION INTRAMUSCULAR; INTRAVENOUS at 16:05

## 2022-01-12 RX ADMIN — KETAMINE HYDROCHLORIDE 11 MCG/KG/MIN: 50 INJECTION INTRAMUSCULAR; INTRAVENOUS at 18:14

## 2022-01-12 RX ADMIN — ALBUTEROL SULFATE 2 PUFF: 90 AEROSOL, METERED RESPIRATORY (INHALATION) at 14:59

## 2022-01-12 RX ADMIN — ENOXAPARIN SODIUM 110 MG: 120 INJECTION SUBCUTANEOUS at 17:45

## 2022-01-12 RX ADMIN — FAMOTIDINE 20 MG: 10 INJECTION INTRAVENOUS at 20:07

## 2022-01-12 RX ADMIN — PROPOFOL 65 MCG/KG/MIN: 10 INJECTION, EMULSION INTRAVENOUS at 08:29

## 2022-01-12 RX ADMIN — MORPHINE SULFATE 2 MG: 2 INJECTION, SOLUTION INTRAMUSCULAR; INTRAVENOUS at 05:31

## 2022-01-12 RX ADMIN — PROPOFOL 75 MCG/KG/MIN: 10 INJECTION, EMULSION INTRAVENOUS at 16:57

## 2022-01-12 RX ADMIN — ATORVASTATIN CALCIUM 20 MG: 10 TABLET, FILM COATED ORAL at 20:06

## 2022-01-12 RX ADMIN — PROPOFOL 75 MCG/KG/MIN: 10 INJECTION, EMULSION INTRAVENOUS at 21:30

## 2022-01-12 RX ADMIN — BUDESONIDE AND FORMOTEROL FUMARATE DIHYDRATE 2 PUFF: 160; 4.5 AEROSOL RESPIRATORY (INHALATION) at 07:12

## 2022-01-12 RX ADMIN — ALBUTEROL SULFATE 2 PUFF: 90 AEROSOL, METERED RESPIRATORY (INHALATION) at 10:22

## 2022-01-12 RX ADMIN — PROPOFOL 75 MCG/KG/MIN: 10 INJECTION, EMULSION INTRAVENOUS at 14:54

## 2022-01-12 RX ADMIN — PROPOFOL 50 MCG/KG/MIN: 10 INJECTION, EMULSION INTRAVENOUS at 02:29

## 2022-01-12 RX ADMIN — PROPOFOL 75 MCG/KG/MIN: 10 INJECTION, EMULSION INTRAVENOUS at 19:11

## 2022-01-12 RX ADMIN — Medication 0.06 MCG/KG/MIN: at 11:47

## 2022-01-12 RX ADMIN — ROCURONIUM BROMIDE 100 MG: 50 INJECTION INTRAVENOUS at 16:31

## 2022-01-12 RX ADMIN — PROPOFOL 75 MCG/KG/MIN: 10 INJECTION, EMULSION INTRAVENOUS at 10:46

## 2022-01-12 RX ADMIN — TAZOBACTAM SODIUM AND PIPERACILLIN SODIUM 4.5 G: 500; 4 INJECTION, SOLUTION INTRAVENOUS at 06:41

## 2022-01-12 RX ADMIN — DEXAMETHASONE 6 MG: 4 TABLET ORAL at 08:05

## 2022-01-12 RX ADMIN — KETAMINE HYDROCHLORIDE 11 MCG/KG/MIN: 50 INJECTION INTRAMUSCULAR; INTRAVENOUS at 10:47

## 2022-01-12 RX ADMIN — MORPHINE SULFATE 2 MG: 2 INJECTION, SOLUTION INTRAMUSCULAR; INTRAVENOUS at 10:03

## 2022-01-12 RX ADMIN — ZINC SULFATE 220 MG (50 MG) CAPSULE 220 MG: CAPSULE at 08:05

## 2022-01-12 RX ADMIN — PROPOFOL 75 MCG/KG/MIN: 10 INJECTION, EMULSION INTRAVENOUS at 13:14

## 2022-01-12 RX ADMIN — PROPOFOL 50 MCG/KG/MIN: 10 INJECTION, EMULSION INTRAVENOUS at 04:23

## 2022-01-12 RX ADMIN — MORPHINE SULFATE 2 MG: 2 INJECTION, SOLUTION INTRAMUSCULAR; INTRAVENOUS at 11:55

## 2022-01-12 RX ADMIN — ALBUTEROL SULFATE 2 PUFF: 90 AEROSOL, METERED RESPIRATORY (INHALATION) at 18:09

## 2022-01-12 RX ADMIN — THIAMINE HCL TAB 100 MG 100 MG: 100 TAB at 08:05

## 2022-01-12 RX ADMIN — FAMOTIDINE 20 MG: 10 INJECTION INTRAVENOUS at 08:05

## 2022-01-13 LAB
ALBUMIN SERPL-MCNC: 2.6 G/DL (ref 3.5–5.2)
ALBUMIN/GLOB SERPL: 0.7 G/DL
ALP SERPL-CCNC: 66 U/L (ref 39–117)
ALT SERPL W P-5'-P-CCNC: 26 U/L (ref 1–41)
ANION GAP SERPL CALCULATED.3IONS-SCNC: 7 MMOL/L (ref 5–15)
ARTERIAL PATENCY WRIST A: ABNORMAL
AST SERPL-CCNC: 26 U/L (ref 1–40)
ATMOSPHERIC PRESS: 749 MMHG
BASE EXCESS BLDA CALC-SCNC: 3.9 MMOL/L (ref 0–2)
BDY SITE: ABNORMAL
BH CV ECHO MEAS - AO MAX PG (FULL): 2.2 MMHG
BH CV ECHO MEAS - AO MAX PG: 5.7 MMHG
BH CV ECHO MEAS - AO MEAN PG (FULL): 2 MMHG
BH CV ECHO MEAS - AO MEAN PG: 4 MMHG
BH CV ECHO MEAS - AO ROOT AREA (BSA CORRECTED): 1.5
BH CV ECHO MEAS - AO ROOT AREA: 9.1 CM^2
BH CV ECHO MEAS - AO ROOT DIAM: 3.4 CM
BH CV ECHO MEAS - AO V2 MAX: 119 CM/SEC
BH CV ECHO MEAS - AO V2 MEAN: 92.8 CM/SEC
BH CV ECHO MEAS - AO V2 VTI: 21.3 CM
BH CV ECHO MEAS - AVA(I,A): 4 CM^2
BH CV ECHO MEAS - AVA(I,D): 4 CM^2
BH CV ECHO MEAS - AVA(V,A): 3.5 CM^2
BH CV ECHO MEAS - AVA(V,D): 3.5 CM^2
BH CV ECHO MEAS - BSA(HAYCOCK): 2.3 M^2
BH CV ECHO MEAS - BSA: 2.2 M^2
BH CV ECHO MEAS - BZI_BMI: 34 KILOGRAMS/M^2
BH CV ECHO MEAS - BZI_METRIC_HEIGHT: 177.8 CM
BH CV ECHO MEAS - BZI_METRIC_WEIGHT: 107.5 KG
BH CV ECHO MEAS - EDV(CUBED): 121.3 ML
BH CV ECHO MEAS - EDV(MOD-SP4): 98.8 ML
BH CV ECHO MEAS - EDV(TEICH): 115.5 ML
BH CV ECHO MEAS - EF(CUBED): 72.7 %
BH CV ECHO MEAS - EF(MOD-BP): 60 %
BH CV ECHO MEAS - EF(MOD-SP4): 59.6 %
BH CV ECHO MEAS - EF(TEICH): 64.3 %
BH CV ECHO MEAS - ESV(CUBED): 33.1 ML
BH CV ECHO MEAS - ESV(MOD-SP4): 39.9 ML
BH CV ECHO MEAS - ESV(TEICH): 41.3 ML
BH CV ECHO MEAS - FS: 35.2 %
BH CV ECHO MEAS - IVS/LVPW: 1
BH CV ECHO MEAS - IVSD: 1.1 CM
BH CV ECHO MEAS - LA DIMENSION: 3 CM
BH CV ECHO MEAS - LA/AO: 0.88
BH CV ECHO MEAS - LAT PEAK E' VEL: 9.5 CM/SEC
BH CV ECHO MEAS - LV DIASTOLIC VOL/BSA (35-75): 44 ML/M^2
BH CV ECHO MEAS - LV MASS(C)D: 188.8 GRAMS
BH CV ECHO MEAS - LV MASS(C)DI: 84.1 GRAMS/M^2
BH CV ECHO MEAS - LV MAX PG: 3.5 MMHG
BH CV ECHO MEAS - LV MEAN PG: 2 MMHG
BH CV ECHO MEAS - LV SYSTOLIC VOL/BSA (12-30): 17.8 ML/M^2
BH CV ECHO MEAS - LV V1 MAX: 93.1 CM/SEC
BH CV ECHO MEAS - LV V1 MEAN: 63.1 CM/SEC
BH CV ECHO MEAS - LV V1 VTI: 18.9 CM
BH CV ECHO MEAS - LVIDD: 5 CM
BH CV ECHO MEAS - LVIDS: 3.2 CM
BH CV ECHO MEAS - LVLD AP4: 8.6 CM
BH CV ECHO MEAS - LVLS AP4: 7.5 CM
BH CV ECHO MEAS - LVOT AREA (M): 4.5 CM^2
BH CV ECHO MEAS - LVOT AREA: 4.5 CM^2
BH CV ECHO MEAS - LVOT DIAM: 2.4 CM
BH CV ECHO MEAS - LVPWD: 1 CM
BH CV ECHO MEAS - MED PEAK E' VEL: 8.27 CM/SEC
BH CV ECHO MEAS - MV A MAX VEL: 71.3 CM/SEC
BH CV ECHO MEAS - MV DEC TIME: 0.18 SEC
BH CV ECHO MEAS - MV E MAX VEL: 66.5 CM/SEC
BH CV ECHO MEAS - MV E/A: 0.93
BH CV ECHO MEAS - RAP SYSTOLE: 5 MMHG
BH CV ECHO MEAS - RVSP: 9.9 MMHG
BH CV ECHO MEAS - SI(AO): 86.2 ML/M^2
BH CV ECHO MEAS - SI(CUBED): 39.3 ML/M^2
BH CV ECHO MEAS - SI(LVOT): 38.1 ML/M^2
BH CV ECHO MEAS - SI(MOD-SP4): 26.3 ML/M^2
BH CV ECHO MEAS - SI(TEICH): 33.1 ML/M^2
BH CV ECHO MEAS - SV(AO): 193.4 ML
BH CV ECHO MEAS - SV(CUBED): 88.2 ML
BH CV ECHO MEAS - SV(LVOT): 85.5 ML
BH CV ECHO MEAS - SV(MOD-SP4): 58.9 ML
BH CV ECHO MEAS - SV(TEICH): 74.2 ML
BH CV ECHO MEAS - TR MAX VEL: 111 CM/SEC
BH CV ECHO MEASUREMENTS AVERAGE E/E' RATIO: 7.48
BILIRUB SERPL-MCNC: 0.8 MG/DL (ref 0–1.2)
BODY TEMPERATURE: 37 C
BUN SERPL-MCNC: 33 MG/DL (ref 6–20)
BUN/CREAT SERPL: 35.9 (ref 7–25)
CALCIUM SPEC-SCNC: 7.8 MG/DL (ref 8.6–10.5)
CHLORIDE SERPL-SCNC: 102 MMOL/L (ref 98–107)
CO2 SERPL-SCNC: 27 MMOL/L (ref 22–29)
CREAT SERPL-MCNC: 0.92 MG/DL (ref 0.76–1.27)
CRP SERPL-MCNC: 24.89 MG/DL (ref 0–0.5)
DEPRECATED RDW RBC AUTO: 46.9 FL (ref 37–54)
ERYTHROCYTE [DISTWIDTH] IN BLOOD BY AUTOMATED COUNT: 15.9 % (ref 12.3–15.4)
FERRITIN SERPL-MCNC: 482 NG/ML (ref 30–400)
GFR SERPL CREATININE-BSD FRML MDRD: 87 ML/MIN/1.73
GLOBULIN UR ELPH-MCNC: 3.8 GM/DL
GLUCOSE BLDC GLUCOMTR-MCNC: 124 MG/DL (ref 70–130)
GLUCOSE BLDC GLUCOMTR-MCNC: 126 MG/DL (ref 70–130)
GLUCOSE BLDC GLUCOMTR-MCNC: 128 MG/DL (ref 70–130)
GLUCOSE BLDC GLUCOMTR-MCNC: 131 MG/DL (ref 70–130)
GLUCOSE SERPL-MCNC: 129 MG/DL (ref 65–99)
HCO3 BLDA-SCNC: 28.7 MMOL/L (ref 20–26)
HCT VFR BLD AUTO: 30.6 % (ref 37.5–51)
HGB BLD-MCNC: 9.4 G/DL (ref 13–17.7)
LEFT ATRIUM VOLUME INDEX: 14.5 ML/M2
LEFT ATRIUM VOLUME: 32.5 CM3
Lab: ABNORMAL
MAXIMAL PREDICTED HEART RATE: 171 BPM
MCH RBC QN AUTO: 24.9 PG (ref 26.6–33)
MCHC RBC AUTO-ENTMCNC: 30.7 G/DL (ref 31.5–35.7)
MCV RBC AUTO: 81.2 FL (ref 79–97)
MODALITY: ABNORMAL
PCO2 BLDA: 43.1 MM HG (ref 35–45)
PCO2 TEMP ADJ BLD: 43.1 MM HG (ref 35–45)
PH BLDA: 7.43 PH UNITS (ref 7.35–7.45)
PH, TEMP CORRECTED: 7.43 PH UNITS (ref 7.35–7.45)
PLATELET # BLD AUTO: 358 10*3/MM3 (ref 140–450)
PMV BLD AUTO: 10.4 FL (ref 6–12)
PO2 BLDA: 154 MM HG (ref 83–108)
PO2 TEMP ADJ BLD: 154 MM HG (ref 83–108)
POTASSIUM SERPL-SCNC: 5 MMOL/L (ref 3.5–5.2)
PROCALCITONIN SERPL-MCNC: 0.78 NG/ML (ref 0–0.25)
PROT SERPL-MCNC: 6.4 G/DL (ref 6–8.5)
RBC # BLD AUTO: 3.77 10*6/MM3 (ref 4.14–5.8)
SAO2 % BLDCOA: 99.8 % (ref 94–99)
SODIUM SERPL-SCNC: 136 MMOL/L (ref 136–145)
STRESS TARGET HR: 145 BPM
VENTILATOR MODE: ABNORMAL
WBC NRBC COR # BLD: 10.11 10*3/MM3 (ref 3.4–10.8)

## 2022-01-13 PROCEDURE — 86140 C-REACTIVE PROTEIN: CPT | Performed by: NURSE PRACTITIONER

## 2022-01-13 PROCEDURE — 25010000002 PIPERACILLIN SOD-TAZOBACTAM PER 1 G: Performed by: INTERNAL MEDICINE

## 2022-01-13 PROCEDURE — 82962 GLUCOSE BLOOD TEST: CPT

## 2022-01-13 PROCEDURE — 94799 UNLISTED PULMONARY SVC/PX: CPT

## 2022-01-13 PROCEDURE — 25010000002 ENOXAPARIN PER 10 MG: Performed by: INTERNAL MEDICINE

## 2022-01-13 PROCEDURE — 25010000002 MORPHINE SULFATE (PF) 2 MG/ML SOLUTION: Performed by: INTERNAL MEDICINE

## 2022-01-13 PROCEDURE — 94003 VENT MGMT INPAT SUBQ DAY: CPT

## 2022-01-13 PROCEDURE — 25010000002 PROPOFOL 10 MG/ML EMULSION: Performed by: INTERNAL MEDICINE

## 2022-01-13 PROCEDURE — 80053 COMPREHEN METABOLIC PANEL: CPT | Performed by: INTERNAL MEDICINE

## 2022-01-13 PROCEDURE — 25010000002 FUROSEMIDE PER 20 MG: Performed by: INTERNAL MEDICINE

## 2022-01-13 PROCEDURE — 82803 BLOOD GASES ANY COMBINATION: CPT

## 2022-01-13 PROCEDURE — 84145 PROCALCITONIN (PCT): CPT | Performed by: INTERNAL MEDICINE

## 2022-01-13 PROCEDURE — 85027 COMPLETE CBC AUTOMATED: CPT | Performed by: INTERNAL MEDICINE

## 2022-01-13 PROCEDURE — 63710000001 DEXAMETHASONE PER 0.25 MG: Performed by: INTERNAL MEDICINE

## 2022-01-13 PROCEDURE — 99233 SBSQ HOSP IP/OBS HIGH 50: CPT | Performed by: INTERNAL MEDICINE

## 2022-01-13 PROCEDURE — 82728 ASSAY OF FERRITIN: CPT | Performed by: NURSE PRACTITIONER

## 2022-01-13 RX ORDER — LISINOPRIL 40 MG/1
40 TABLET ORAL DAILY
COMMUNITY
End: 2022-02-09 | Stop reason: HOSPADM

## 2022-01-13 RX ORDER — HYDROCHLOROTHIAZIDE 12.5 MG/1
12.5 TABLET ORAL DAILY
COMMUNITY
End: 2022-02-09 | Stop reason: HOSPADM

## 2022-01-13 RX ORDER — ALBUTEROL SULFATE 90 UG/1
6 AEROSOL, METERED RESPIRATORY (INHALATION)
Status: DISCONTINUED | OUTPATIENT
Start: 2022-01-13 | End: 2022-01-31

## 2022-01-13 RX ORDER — OMEPRAZOLE 20 MG/1
20-40 CAPSULE, DELAYED RELEASE ORAL DAILY
COMMUNITY
End: 2022-02-09 | Stop reason: HOSPADM

## 2022-01-13 RX ORDER — FUROSEMIDE 10 MG/ML
20 INJECTION INTRAMUSCULAR; INTRAVENOUS ONCE
Status: COMPLETED | OUTPATIENT
Start: 2022-01-13 | End: 2022-01-13

## 2022-01-13 RX ADMIN — ALBUTEROL SULFATE 6 PUFF: 90 AEROSOL, METERED RESPIRATORY (INHALATION) at 18:07

## 2022-01-13 RX ADMIN — PROPOFOL 65 MCG/KG/MIN: 10 INJECTION, EMULSION INTRAVENOUS at 15:28

## 2022-01-13 RX ADMIN — MORPHINE SULFATE 2 MG: 2 INJECTION, SOLUTION INTRAMUSCULAR; INTRAVENOUS at 06:37

## 2022-01-13 RX ADMIN — BUDESONIDE AND FORMOTEROL FUMARATE DIHYDRATE 2 PUFF: 160; 4.5 AEROSOL RESPIRATORY (INHALATION) at 07:40

## 2022-01-13 RX ADMIN — PROPOFOL 60 MCG/KG/MIN: 10 INJECTION, EMULSION INTRAVENOUS at 20:55

## 2022-01-13 RX ADMIN — ENOXAPARIN SODIUM 110 MG: 120 INJECTION SUBCUTANEOUS at 05:17

## 2022-01-13 RX ADMIN — FUROSEMIDE 20 MG: 10 INJECTION INTRAMUSCULAR; INTRAVENOUS at 12:17

## 2022-01-13 RX ADMIN — PROPOFOL 75 MCG/KG/MIN: 10 INJECTION, EMULSION INTRAVENOUS at 01:13

## 2022-01-13 RX ADMIN — PROPOFOL 75 MCG/KG/MIN: 10 INJECTION, EMULSION INTRAVENOUS at 04:19

## 2022-01-13 RX ADMIN — ZINC SULFATE 220 MG (50 MG) CAPSULE 220 MG: CAPSULE at 08:15

## 2022-01-13 RX ADMIN — TAZOBACTAM SODIUM AND PIPERACILLIN SODIUM 4.5 G: 500; 4 INJECTION, SOLUTION INTRAVENOUS at 23:58

## 2022-01-13 RX ADMIN — ALBUTEROL SULFATE 6 PUFF: 90 AEROSOL, METERED RESPIRATORY (INHALATION) at 10:39

## 2022-01-13 RX ADMIN — OXYCODONE HYDROCHLORIDE AND ACETAMINOPHEN 500 MG: 500 TABLET ORAL at 08:14

## 2022-01-13 RX ADMIN — MORPHINE SULFATE 2 MG: 2 INJECTION, SOLUTION INTRAMUSCULAR; INTRAVENOUS at 18:06

## 2022-01-13 RX ADMIN — PROPOFOL 65 MCG/KG/MIN: 10 INJECTION, EMULSION INTRAVENOUS at 22:59

## 2022-01-13 RX ADMIN — FAMOTIDINE 20 MG: 10 INJECTION INTRAVENOUS at 20:38

## 2022-01-13 RX ADMIN — DEXAMETHASONE 6 MG: 4 TABLET ORAL at 08:14

## 2022-01-13 RX ADMIN — KETAMINE HYDROCHLORIDE 10 MCG/KG/MIN: 50 INJECTION INTRAMUSCULAR; INTRAVENOUS at 20:58

## 2022-01-13 RX ADMIN — KETAMINE HYDROCHLORIDE 10 MCG/KG/MIN: 50 INJECTION INTRAMUSCULAR; INTRAVENOUS at 04:14

## 2022-01-13 RX ADMIN — ATORVASTATIN CALCIUM 20 MG: 10 TABLET, FILM COATED ORAL at 20:38

## 2022-01-13 RX ADMIN — ALBUTEROL SULFATE 6 PUFF: 90 AEROSOL, METERED RESPIRATORY (INHALATION) at 14:59

## 2022-01-13 RX ADMIN — FAMOTIDINE 20 MG: 10 INJECTION INTRAVENOUS at 08:15

## 2022-01-13 RX ADMIN — TAZOBACTAM SODIUM AND PIPERACILLIN SODIUM 4.5 G: 500; 4 INJECTION, SOLUTION INTRAVENOUS at 15:28

## 2022-01-13 RX ADMIN — TAZOBACTAM SODIUM AND PIPERACILLIN SODIUM 4.5 G: 500; 4 INJECTION, SOLUTION INTRAVENOUS at 00:44

## 2022-01-13 RX ADMIN — PROPOFOL 60 MCG/KG/MIN: 10 INJECTION, EMULSION INTRAVENOUS at 18:50

## 2022-01-13 RX ADMIN — MORPHINE SULFATE 2 MG: 2 INJECTION, SOLUTION INTRAMUSCULAR; INTRAVENOUS at 00:44

## 2022-01-13 RX ADMIN — PROPOFOL 75 MCG/KG/MIN: 10 INJECTION, EMULSION INTRAVENOUS at 02:19

## 2022-01-13 RX ADMIN — PROPOFOL 75 MCG/KG/MIN: 10 INJECTION, EMULSION INTRAVENOUS at 06:29

## 2022-01-13 RX ADMIN — TAZOBACTAM SODIUM AND PIPERACILLIN SODIUM 4.5 G: 500; 4 INJECTION, SOLUTION INTRAVENOUS at 06:38

## 2022-01-13 RX ADMIN — THIAMINE HCL TAB 100 MG 100 MG: 100 TAB at 08:15

## 2022-01-13 RX ADMIN — PROPOFOL 70 MCG/KG/MIN: 10 INJECTION, EMULSION INTRAVENOUS at 12:20

## 2022-01-13 RX ADMIN — ENOXAPARIN SODIUM 110 MG: 120 INJECTION SUBCUTANEOUS at 18:06

## 2022-01-13 RX ADMIN — PROPOFOL 75 MCG/KG/MIN: 10 INJECTION, EMULSION INTRAVENOUS at 08:13

## 2022-01-13 RX ADMIN — KETAMINE HYDROCHLORIDE 10 MCG/KG/MIN: 50 INJECTION INTRAMUSCULAR; INTRAVENOUS at 12:20

## 2022-01-13 RX ADMIN — BUDESONIDE AND FORMOTEROL FUMARATE DIHYDRATE 2 PUFF: 160; 4.5 AEROSOL RESPIRATORY (INHALATION) at 18:07

## 2022-01-13 RX ADMIN — ALBUTEROL SULFATE 2 PUFF: 90 AEROSOL, METERED RESPIRATORY (INHALATION) at 07:40

## 2022-01-13 RX ADMIN — PROPOFOL 60 MCG/KG/MIN: 10 INJECTION, EMULSION INTRAVENOUS at 18:06

## 2022-01-13 RX ADMIN — PROPOFOL 75 MCG/KG/MIN: 10 INJECTION, EMULSION INTRAVENOUS at 10:07

## 2022-01-13 RX ADMIN — ACETAMINOPHEN 650 MG: 650 SUPPOSITORY RECTAL at 01:13

## 2022-01-14 ENCOUNTER — APPOINTMENT (OUTPATIENT)
Dept: GENERAL RADIOLOGY | Facility: HOSPITAL | Age: 52
End: 2022-01-14

## 2022-01-14 LAB
ALBUMIN SERPL-MCNC: 2.4 G/DL (ref 3.5–5.2)
ALBUMIN/GLOB SERPL: 0.6 G/DL
ALP SERPL-CCNC: 75 U/L (ref 39–117)
ALT SERPL W P-5'-P-CCNC: 35 U/L (ref 1–41)
ANION GAP SERPL CALCULATED.3IONS-SCNC: 7 MMOL/L (ref 5–15)
ARTERIAL PATENCY WRIST A: ABNORMAL
AST SERPL-CCNC: 41 U/L (ref 1–40)
ATMOSPHERIC PRESS: 751 MMHG
BACTERIA SPEC RESP CULT: NO GROWTH
BASE EXCESS BLDA CALC-SCNC: 6.9 MMOL/L (ref 0–2)
BDY SITE: ABNORMAL
BILIRUB SERPL-MCNC: 0.8 MG/DL (ref 0–1.2)
BODY TEMPERATURE: 37 C
BUN SERPL-MCNC: 30 MG/DL (ref 6–20)
BUN/CREAT SERPL: 43.5 (ref 7–25)
CALCIUM SPEC-SCNC: 7.6 MG/DL (ref 8.6–10.5)
CHLORIDE SERPL-SCNC: 101 MMOL/L (ref 98–107)
CO2 SERPL-SCNC: 27 MMOL/L (ref 22–29)
CREAT SERPL-MCNC: 0.69 MG/DL (ref 0.76–1.27)
CRP SERPL-MCNC: 10.1 MG/DL (ref 0–0.5)
DEPRECATED RDW RBC AUTO: 47.3 FL (ref 37–54)
ERYTHROCYTE [DISTWIDTH] IN BLOOD BY AUTOMATED COUNT: 15.9 % (ref 12.3–15.4)
GFR SERPL CREATININE-BSD FRML MDRD: 122 ML/MIN/1.73
GLOBULIN UR ELPH-MCNC: 4 GM/DL
GLUCOSE BLDC GLUCOMTR-MCNC: 102 MG/DL (ref 70–130)
GLUCOSE BLDC GLUCOMTR-MCNC: 105 MG/DL (ref 70–130)
GLUCOSE BLDC GLUCOMTR-MCNC: 86 MG/DL (ref 70–130)
GLUCOSE BLDC GLUCOMTR-MCNC: 98 MG/DL (ref 70–130)
GLUCOSE SERPL-MCNC: 99 MG/DL (ref 65–99)
GRAM STN SPEC: NORMAL
HCO3 BLDA-SCNC: 30.7 MMOL/L (ref 20–26)
HCT VFR BLD AUTO: 29 % (ref 37.5–51)
HGB BLD-MCNC: 8.9 G/DL (ref 13–17.7)
INHALED O2 CONCENTRATION: 30 %
Lab: ABNORMAL
MCH RBC QN AUTO: 25.1 PG (ref 26.6–33)
MCHC RBC AUTO-ENTMCNC: 30.7 G/DL (ref 31.5–35.7)
MCV RBC AUTO: 81.9 FL (ref 79–97)
MODALITY: ABNORMAL
PCO2 BLDA: 40 MM HG (ref 35–45)
PCO2 TEMP ADJ BLD: 40 MM HG (ref 35–45)
PEEP RESPIRATORY: 26 CM[H2O]
PH BLDA: 7.49 PH UNITS (ref 7.35–7.45)
PH, TEMP CORRECTED: 7.49 PH UNITS (ref 7.35–7.45)
PLATELET # BLD AUTO: 347 10*3/MM3 (ref 140–450)
PMV BLD AUTO: 10.4 FL (ref 6–12)
PO2 BLDA: 68.6 MM HG (ref 83–108)
PO2 TEMP ADJ BLD: 68.6 MM HG (ref 83–108)
POTASSIUM SERPL-SCNC: 4.5 MMOL/L (ref 3.5–5.2)
PROCALCITONIN SERPL-MCNC: 0.46 NG/ML (ref 0–0.25)
PROT SERPL-MCNC: 6.4 G/DL (ref 6–8.5)
RBC # BLD AUTO: 3.54 10*6/MM3 (ref 4.14–5.8)
SAO2 % BLDCOA: 95.2 % (ref 94–99)
SET MECH RESP RATE: 34
SODIUM SERPL-SCNC: 135 MMOL/L (ref 136–145)
VENTILATOR MODE: ABNORMAL
WBC NRBC COR # BLD: 8.12 10*3/MM3 (ref 3.4–10.8)

## 2022-01-14 PROCEDURE — 25010000002 ENOXAPARIN PER 10 MG: Performed by: INTERNAL MEDICINE

## 2022-01-14 PROCEDURE — 25010000002 PROPOFOL 10 MG/ML EMULSION: Performed by: INTERNAL MEDICINE

## 2022-01-14 PROCEDURE — 25010000002 MORPHINE SULFATE (PF) 2 MG/ML SOLUTION: Performed by: INTERNAL MEDICINE

## 2022-01-14 PROCEDURE — 94003 VENT MGMT INPAT SUBQ DAY: CPT

## 2022-01-14 PROCEDURE — 94799 UNLISTED PULMONARY SVC/PX: CPT

## 2022-01-14 PROCEDURE — 99233 SBSQ HOSP IP/OBS HIGH 50: CPT | Performed by: INTERNAL MEDICINE

## 2022-01-14 PROCEDURE — 82962 GLUCOSE BLOOD TEST: CPT

## 2022-01-14 PROCEDURE — 80053 COMPREHEN METABOLIC PANEL: CPT | Performed by: INTERNAL MEDICINE

## 2022-01-14 PROCEDURE — 85027 COMPLETE CBC AUTOMATED: CPT | Performed by: INTERNAL MEDICINE

## 2022-01-14 PROCEDURE — 71045 X-RAY EXAM CHEST 1 VIEW: CPT

## 2022-01-14 PROCEDURE — 25010000002 PIPERACILLIN SOD-TAZOBACTAM PER 1 G: Performed by: INTERNAL MEDICINE

## 2022-01-14 PROCEDURE — 82803 BLOOD GASES ANY COMBINATION: CPT

## 2022-01-14 PROCEDURE — 86140 C-REACTIVE PROTEIN: CPT | Performed by: INTERNAL MEDICINE

## 2022-01-14 PROCEDURE — 84145 PROCALCITONIN (PCT): CPT | Performed by: INTERNAL MEDICINE

## 2022-01-14 PROCEDURE — 63710000001 DEXAMETHASONE PER 0.25 MG: Performed by: INTERNAL MEDICINE

## 2022-01-14 RX ORDER — MELATONIN
1000 DAILY
Status: DISCONTINUED | OUTPATIENT
Start: 2022-01-14 | End: 2022-01-28

## 2022-01-14 RX ADMIN — BUDESONIDE AND FORMOTEROL FUMARATE DIHYDRATE 2 PUFF: 160; 4.5 AEROSOL RESPIRATORY (INHALATION) at 06:34

## 2022-01-14 RX ADMIN — TAZOBACTAM SODIUM AND PIPERACILLIN SODIUM 4.5 G: 500; 4 INJECTION, SOLUTION INTRAVENOUS at 23:07

## 2022-01-14 RX ADMIN — ALBUTEROL SULFATE 6 PUFF: 90 AEROSOL, METERED RESPIRATORY (INHALATION) at 10:40

## 2022-01-14 RX ADMIN — GUAIFENESIN 400 MG: 200 SOLUTION ORAL at 11:07

## 2022-01-14 RX ADMIN — ATORVASTATIN CALCIUM 20 MG: 10 TABLET, FILM COATED ORAL at 20:05

## 2022-01-14 RX ADMIN — ZINC SULFATE 220 MG (50 MG) CAPSULE 220 MG: CAPSULE at 08:09

## 2022-01-14 RX ADMIN — GUAIFENESIN 400 MG: 200 SOLUTION ORAL at 17:25

## 2022-01-14 RX ADMIN — ENOXAPARIN SODIUM 110 MG: 120 INJECTION SUBCUTANEOUS at 17:26

## 2022-01-14 RX ADMIN — Medication 1000 UNITS: at 11:07

## 2022-01-14 RX ADMIN — ALBUTEROL SULFATE 6 PUFF: 90 AEROSOL, METERED RESPIRATORY (INHALATION) at 19:06

## 2022-01-14 RX ADMIN — KETAMINE HYDROCHLORIDE 11 MCG/KG/MIN: 50 INJECTION INTRAMUSCULAR; INTRAVENOUS at 14:08

## 2022-01-14 RX ADMIN — PROPOFOL 70 MCG/KG/MIN: 10 INJECTION, EMULSION INTRAVENOUS at 08:09

## 2022-01-14 RX ADMIN — THIAMINE HCL TAB 100 MG 100 MG: 100 TAB at 08:09

## 2022-01-14 RX ADMIN — PROPOFOL 70 MCG/KG/MIN: 10 INJECTION, EMULSION INTRAVENOUS at 06:40

## 2022-01-14 RX ADMIN — PROPOFOL 70 MCG/KG/MIN: 10 INJECTION, EMULSION INTRAVENOUS at 05:00

## 2022-01-14 RX ADMIN — FAMOTIDINE 20 MG: 10 INJECTION INTRAVENOUS at 20:05

## 2022-01-14 RX ADMIN — PROPOFOL 75 MCG/KG/MIN: 10 INJECTION, EMULSION INTRAVENOUS at 17:25

## 2022-01-14 RX ADMIN — ENOXAPARIN SODIUM 110 MG: 120 INJECTION SUBCUTANEOUS at 06:11

## 2022-01-14 RX ADMIN — TAZOBACTAM SODIUM AND PIPERACILLIN SODIUM 4.5 G: 500; 4 INJECTION, SOLUTION INTRAVENOUS at 15:41

## 2022-01-14 RX ADMIN — ALBUTEROL SULFATE 6 PUFF: 90 AEROSOL, METERED RESPIRATORY (INHALATION) at 14:09

## 2022-01-14 RX ADMIN — PROPOFOL 75 MCG/KG/MIN: 10 INJECTION, EMULSION INTRAVENOUS at 14:09

## 2022-01-14 RX ADMIN — PROPOFOL 60 MCG/KG/MIN: 10 INJECTION, EMULSION INTRAVENOUS at 11:07

## 2022-01-14 RX ADMIN — PROPOFOL 75 MCG/KG/MIN: 10 INJECTION, EMULSION INTRAVENOUS at 15:38

## 2022-01-14 RX ADMIN — OXYCODONE HYDROCHLORIDE AND ACETAMINOPHEN 500 MG: 500 TABLET ORAL at 08:09

## 2022-01-14 RX ADMIN — KETAMINE HYDROCHLORIDE 11 MCG/KG/MIN: 50 INJECTION INTRAMUSCULAR; INTRAVENOUS at 21:19

## 2022-01-14 RX ADMIN — FAMOTIDINE 20 MG: 10 INJECTION INTRAVENOUS at 08:09

## 2022-01-14 RX ADMIN — PROPOFOL 70 MCG/KG/MIN: 10 INJECTION, EMULSION INTRAVENOUS at 01:05

## 2022-01-14 RX ADMIN — DEXAMETHASONE 6 MG: 4 TABLET ORAL at 08:09

## 2022-01-14 RX ADMIN — MORPHINE SULFATE 2 MG: 2 INJECTION, SOLUTION INTRAMUSCULAR; INTRAVENOUS at 23:31

## 2022-01-14 RX ADMIN — PROPOFOL 70 MCG/KG/MIN: 10 INJECTION, EMULSION INTRAVENOUS at 23:07

## 2022-01-14 RX ADMIN — KETAMINE HYDROCHLORIDE 11 MCG/KG/MIN: 50 INJECTION INTRAMUSCULAR; INTRAVENOUS at 06:00

## 2022-01-14 RX ADMIN — ALBUTEROL SULFATE 6 PUFF: 90 AEROSOL, METERED RESPIRATORY (INHALATION) at 06:34

## 2022-01-14 RX ADMIN — PROPOFOL 75 MCG/KG/MIN: 10 INJECTION, EMULSION INTRAVENOUS at 21:19

## 2022-01-14 RX ADMIN — PROPOFOL 75 MCG/KG/MIN: 10 INJECTION, EMULSION INTRAVENOUS at 19:33

## 2022-01-14 RX ADMIN — TAZOBACTAM SODIUM AND PIPERACILLIN SODIUM 4.5 G: 500; 4 INJECTION, SOLUTION INTRAVENOUS at 06:39

## 2022-01-14 RX ADMIN — PROPOFOL 70 MCG/KG/MIN: 10 INJECTION, EMULSION INTRAVENOUS at 03:10

## 2022-01-14 RX ADMIN — MORPHINE SULFATE 2 MG: 2 INJECTION, SOLUTION INTRAMUSCULAR; INTRAVENOUS at 14:08

## 2022-01-14 RX ADMIN — BUDESONIDE AND FORMOTEROL FUMARATE DIHYDRATE 2 PUFF: 160; 4.5 AEROSOL RESPIRATORY (INHALATION) at 19:07

## 2022-01-15 LAB
ARTERIAL PATENCY WRIST A: ABNORMAL
ATMOSPHERIC PRESS: 751 MMHG
BASE EXCESS BLDA CALC-SCNC: 5 MMOL/L (ref 0–2)
BDY SITE: ABNORMAL
BODY TEMPERATURE: 37 C
CRP SERPL-MCNC: 7.01 MG/DL (ref 0–0.5)
FERRITIN SERPL-MCNC: 379.5 NG/ML (ref 30–400)
GLUCOSE BLDC GLUCOMTR-MCNC: 101 MG/DL (ref 70–130)
GLUCOSE BLDC GLUCOMTR-MCNC: 88 MG/DL (ref 70–130)
GLUCOSE BLDC GLUCOMTR-MCNC: 90 MG/DL (ref 70–130)
HCO3 BLDA-SCNC: 28.9 MMOL/L (ref 20–26)
INHALED O2 CONCENTRATION: 35 %
Lab: ABNORMAL
MODALITY: ABNORMAL
PAW @ PEAK INSP FLOW SETTING VENT: 26 CMH2O
PCO2 BLDA: 39 MM HG (ref 35–45)
PCO2 TEMP ADJ BLD: 39 MM HG (ref 35–45)
PEEP RESPIRATORY: 8 CM[H2O]
PH BLDA: 7.48 PH UNITS (ref 7.35–7.45)
PH, TEMP CORRECTED: 7.48 PH UNITS (ref 7.35–7.45)
PO2 BLDA: 78.3 MM HG (ref 83–108)
PO2 TEMP ADJ BLD: 78.3 MM HG (ref 83–108)
SAO2 % BLDCOA: 96.5 % (ref 94–99)
SET MECH RESP RATE: 34
VENTILATOR MODE: ABNORMAL

## 2022-01-15 PROCEDURE — 86140 C-REACTIVE PROTEIN: CPT | Performed by: NURSE PRACTITIONER

## 2022-01-15 PROCEDURE — 82962 GLUCOSE BLOOD TEST: CPT

## 2022-01-15 PROCEDURE — 63710000001 DEXAMETHASONE PER 0.25 MG: Performed by: INTERNAL MEDICINE

## 2022-01-15 PROCEDURE — 25010000002 PIPERACILLIN SOD-TAZOBACTAM PER 1 G: Performed by: INTERNAL MEDICINE

## 2022-01-15 PROCEDURE — 25010000002 ENOXAPARIN PER 10 MG: Performed by: INTERNAL MEDICINE

## 2022-01-15 PROCEDURE — 94799 UNLISTED PULMONARY SVC/PX: CPT

## 2022-01-15 PROCEDURE — 82728 ASSAY OF FERRITIN: CPT | Performed by: NURSE PRACTITIONER

## 2022-01-15 PROCEDURE — 25010000002 MIDAZOLAM 50 MG/10ML SOLUTION 10 ML VIAL: Performed by: INTERNAL MEDICINE

## 2022-01-15 PROCEDURE — 25010000002 MORPHINE PER 10 MG: Performed by: INTERNAL MEDICINE

## 2022-01-15 PROCEDURE — 25010000002 MORPHINE PER 10 MG

## 2022-01-15 PROCEDURE — 94003 VENT MGMT INPAT SUBQ DAY: CPT

## 2022-01-15 PROCEDURE — 25010000002 PROPOFOL 10 MG/ML EMULSION: Performed by: INTERNAL MEDICINE

## 2022-01-15 PROCEDURE — 82803 BLOOD GASES ANY COMBINATION: CPT

## 2022-01-15 RX ORDER — DEXMEDETOMIDINE HYDROCHLORIDE 4 UG/ML
.2-1.5 INJECTION, SOLUTION INTRAVENOUS
Status: DISCONTINUED | OUTPATIENT
Start: 2022-01-15 | End: 2022-01-23

## 2022-01-15 RX ADMIN — PROPOFOL 75 MCG/KG/MIN: 10 INJECTION, EMULSION INTRAVENOUS at 19:20

## 2022-01-15 RX ADMIN — MORPHINE SULFATE 3 MG: 4 INJECTION INTRAVENOUS at 17:39

## 2022-01-15 RX ADMIN — FAMOTIDINE 20 MG: 10 INJECTION INTRAVENOUS at 08:00

## 2022-01-15 RX ADMIN — OXYCODONE HYDROCHLORIDE AND ACETAMINOPHEN 500 MG: 500 TABLET ORAL at 08:00

## 2022-01-15 RX ADMIN — TAZOBACTAM SODIUM AND PIPERACILLIN SODIUM 4.5 G: 500; 4 INJECTION, SOLUTION INTRAVENOUS at 06:30

## 2022-01-15 RX ADMIN — DEXMEDETOMIDINE HYDROCHLORIDE 0.6 MCG/KG/HR: 4 INJECTION, SOLUTION INTRAVENOUS at 20:26

## 2022-01-15 RX ADMIN — MIDAZOLAM 1 MG/HR: 5 INJECTION, SOLUTION INTRAMUSCULAR; INTRAVENOUS at 17:46

## 2022-01-15 RX ADMIN — ALBUTEROL SULFATE 6 PUFF: 90 AEROSOL, METERED RESPIRATORY (INHALATION) at 14:45

## 2022-01-15 RX ADMIN — PROPOFOL 70 MCG/KG/MIN: 10 INJECTION, EMULSION INTRAVENOUS at 03:05

## 2022-01-15 RX ADMIN — ATORVASTATIN CALCIUM 20 MG: 10 TABLET, FILM COATED ORAL at 20:25

## 2022-01-15 RX ADMIN — THIAMINE HCL TAB 100 MG 100 MG: 100 TAB at 08:00

## 2022-01-15 RX ADMIN — PROPOFOL 75 MCG/KG/MIN: 10 INJECTION, EMULSION INTRAVENOUS at 13:19

## 2022-01-15 RX ADMIN — GUAIFENESIN 400 MG: 200 SOLUTION ORAL at 00:28

## 2022-01-15 RX ADMIN — KETAMINE HYDROCHLORIDE 11 MCG/KG/MIN: 50 INJECTION INTRAMUSCULAR; INTRAVENOUS at 04:39

## 2022-01-15 RX ADMIN — PROPOFOL 75 MCG/KG/MIN: 10 INJECTION, EMULSION INTRAVENOUS at 07:32

## 2022-01-15 RX ADMIN — DEXMEDETOMIDINE HYDROCHLORIDE 0.2 MCG/KG/HR: 4 INJECTION, SOLUTION INTRAVENOUS at 16:50

## 2022-01-15 RX ADMIN — ENOXAPARIN SODIUM 110 MG: 120 INJECTION SUBCUTANEOUS at 06:30

## 2022-01-15 RX ADMIN — GUAIFENESIN 400 MG: 200 SOLUTION ORAL at 17:22

## 2022-01-15 RX ADMIN — BUDESONIDE AND FORMOTEROL FUMARATE DIHYDRATE 2 PUFF: 160; 4.5 AEROSOL RESPIRATORY (INHALATION) at 06:58

## 2022-01-15 RX ADMIN — MORPHINE SULFATE 4 MG: 4 INJECTION INTRAVENOUS at 11:00

## 2022-01-15 RX ADMIN — PROPOFOL 75 MCG/KG/MIN: 10 INJECTION, EMULSION INTRAVENOUS at 11:21

## 2022-01-15 RX ADMIN — GUAIFENESIN 400 MG: 200 SOLUTION ORAL at 08:00

## 2022-01-15 RX ADMIN — Medication 4 MG: at 11:00

## 2022-01-15 RX ADMIN — PROPOFOL 75 MCG/KG/MIN: 10 INJECTION, EMULSION INTRAVENOUS at 15:09

## 2022-01-15 RX ADMIN — KETAMINE HYDROCHLORIDE 16 MCG/KG/MIN: 50 INJECTION INTRAMUSCULAR; INTRAVENOUS at 11:00

## 2022-01-15 RX ADMIN — Medication 1000 UNITS: at 08:00

## 2022-01-15 RX ADMIN — ZINC SULFATE 220 MG (50 MG) CAPSULE 220 MG: CAPSULE at 08:00

## 2022-01-15 RX ADMIN — ENOXAPARIN SODIUM 110 MG: 120 INJECTION SUBCUTANEOUS at 17:20

## 2022-01-15 RX ADMIN — PROPOFOL 70 MCG/KG/MIN: 10 INJECTION, EMULSION INTRAVENOUS at 05:09

## 2022-01-15 RX ADMIN — ALBUTEROL SULFATE 6 PUFF: 90 AEROSOL, METERED RESPIRATORY (INHALATION) at 10:40

## 2022-01-15 RX ADMIN — PROPOFOL 75 MCG/KG/MIN: 10 INJECTION, EMULSION INTRAVENOUS at 20:44

## 2022-01-15 RX ADMIN — PROPOFOL 75 MCG/KG/MIN: 10 INJECTION, EMULSION INTRAVENOUS at 17:18

## 2022-01-15 RX ADMIN — PROPOFOL 70 MCG/KG/MIN: 10 INJECTION, EMULSION INTRAVENOUS at 01:08

## 2022-01-15 RX ADMIN — BUDESONIDE AND FORMOTEROL FUMARATE DIHYDRATE 2 PUFF: 160; 4.5 AEROSOL RESPIRATORY (INHALATION) at 20:07

## 2022-01-15 RX ADMIN — PROPOFOL 75 MCG/KG/MIN: 10 INJECTION, EMULSION INTRAVENOUS at 22:38

## 2022-01-15 RX ADMIN — PROPOFOL 75 MCG/KG/MIN: 10 INJECTION, EMULSION INTRAVENOUS at 10:59

## 2022-01-15 RX ADMIN — FAMOTIDINE 20 MG: 10 INJECTION INTRAVENOUS at 20:25

## 2022-01-15 RX ADMIN — ALBUTEROL SULFATE 6 PUFF: 90 AEROSOL, METERED RESPIRATORY (INHALATION) at 20:07

## 2022-01-15 RX ADMIN — DEXAMETHASONE 6 MG: 4 TABLET ORAL at 07:57

## 2022-01-15 RX ADMIN — TAZOBACTAM SODIUM AND PIPERACILLIN SODIUM 4.5 G: 500; 4 INJECTION, SOLUTION INTRAVENOUS at 17:05

## 2022-01-15 RX ADMIN — ALBUTEROL SULFATE 6 PUFF: 90 AEROSOL, METERED RESPIRATORY (INHALATION) at 06:58

## 2022-01-16 ENCOUNTER — APPOINTMENT (OUTPATIENT)
Dept: GENERAL RADIOLOGY | Facility: HOSPITAL | Age: 52
End: 2022-01-16

## 2022-01-16 LAB
ALBUMIN SERPL-MCNC: 2.8 G/DL (ref 3.5–5.2)
ALBUMIN/GLOB SERPL: 0.6 G/DL
ALP SERPL-CCNC: 67 U/L (ref 39–117)
ALT SERPL W P-5'-P-CCNC: 41 U/L (ref 1–41)
ANION GAP SERPL CALCULATED.3IONS-SCNC: 9 MMOL/L (ref 5–15)
ARTERIAL PATENCY WRIST A: ABNORMAL
AST SERPL-CCNC: 38 U/L (ref 1–40)
ATMOSPHERIC PRESS: 749 MMHG
BASE EXCESS BLDA CALC-SCNC: 4.5 MMOL/L (ref 0–2)
BDY SITE: ABNORMAL
BILIRUB SERPL-MCNC: 0.7 MG/DL (ref 0–1.2)
BODY TEMPERATURE: 37 C
BUN SERPL-MCNC: 22 MG/DL (ref 6–20)
BUN/CREAT SERPL: 34.9 (ref 7–25)
CALCIUM SPEC-SCNC: 8.5 MG/DL (ref 8.6–10.5)
CHLORIDE SERPL-SCNC: 100 MMOL/L (ref 98–107)
CO2 SERPL-SCNC: 26 MMOL/L (ref 22–29)
CREAT SERPL-MCNC: 0.63 MG/DL (ref 0.76–1.27)
CRP SERPL-MCNC: 6.81 MG/DL (ref 0–0.5)
DEPRECATED RDW RBC AUTO: 47.3 FL (ref 37–54)
ERYTHROCYTE [DISTWIDTH] IN BLOOD BY AUTOMATED COUNT: 15.9 % (ref 12.3–15.4)
FERRITIN SERPL-MCNC: 412.2 NG/ML (ref 30–400)
GFR SERPL CREATININE-BSD FRML MDRD: 135 ML/MIN/1.73
GLOBULIN UR ELPH-MCNC: 4.7 GM/DL
GLUCOSE BLDC GLUCOMTR-MCNC: 87 MG/DL (ref 70–130)
GLUCOSE BLDC GLUCOMTR-MCNC: 89 MG/DL (ref 70–130)
GLUCOSE BLDC GLUCOMTR-MCNC: 92 MG/DL (ref 70–130)
GLUCOSE BLDC GLUCOMTR-MCNC: 97 MG/DL (ref 70–130)
GLUCOSE SERPL-MCNC: 99 MG/DL (ref 65–99)
HCO3 BLDA-SCNC: 30 MMOL/L (ref 20–26)
HCT VFR BLD AUTO: 32.8 % (ref 37.5–51)
HGB BLD-MCNC: 10 G/DL (ref 13–17.7)
INHALED O2 CONCENTRATION: 40 %
Lab: ABNORMAL
MCH RBC QN AUTO: 25 PG (ref 26.6–33)
MCHC RBC AUTO-ENTMCNC: 30.5 G/DL (ref 31.5–35.7)
MCV RBC AUTO: 82 FL (ref 79–97)
MODALITY: ABNORMAL
PAW @ PEAK INSP FLOW SETTING VENT: 26 CMH2O
PCO2 BLDA: 48.1 MM HG (ref 35–45)
PCO2 TEMP ADJ BLD: 48.1 MM HG (ref 35–45)
PEEP RESPIRATORY: 8 CM[H2O]
PH BLDA: 7.4 PH UNITS (ref 7.35–7.45)
PH, TEMP CORRECTED: 7.4 PH UNITS (ref 7.35–7.45)
PLATELET # BLD AUTO: 425 10*3/MM3 (ref 140–450)
PMV BLD AUTO: 10.3 FL (ref 6–12)
PO2 BLDA: 94.1 MM HG (ref 83–108)
PO2 TEMP ADJ BLD: 94.1 MM HG (ref 83–108)
POTASSIUM SERPL-SCNC: 4.6 MMOL/L (ref 3.5–5.2)
PROCALCITONIN SERPL-MCNC: 0.2 NG/ML (ref 0–0.25)
PROT SERPL-MCNC: 7.5 G/DL (ref 6–8.5)
RBC # BLD AUTO: 4 10*6/MM3 (ref 4.14–5.8)
SAO2 % BLDCOA: 97.4 % (ref 94–99)
SET MECH RESP RATE: 34
SODIUM SERPL-SCNC: 135 MMOL/L (ref 136–145)
VENTILATOR MODE: ABNORMAL
WBC NRBC COR # BLD: 8.82 10*3/MM3 (ref 3.4–10.8)

## 2022-01-16 PROCEDURE — 80053 COMPREHEN METABOLIC PANEL: CPT | Performed by: INTERNAL MEDICINE

## 2022-01-16 PROCEDURE — 94799 UNLISTED PULMONARY SVC/PX: CPT

## 2022-01-16 PROCEDURE — 25010000002 PIPERACILLIN SOD-TAZOBACTAM PER 1 G: Performed by: INTERNAL MEDICINE

## 2022-01-16 PROCEDURE — 82803 BLOOD GASES ANY COMBINATION: CPT

## 2022-01-16 PROCEDURE — 74018 RADEX ABDOMEN 1 VIEW: CPT

## 2022-01-16 PROCEDURE — 84145 PROCALCITONIN (PCT): CPT | Performed by: INTERNAL MEDICINE

## 2022-01-16 PROCEDURE — 82728 ASSAY OF FERRITIN: CPT | Performed by: INTERNAL MEDICINE

## 2022-01-16 PROCEDURE — 63710000001 DEXAMETHASONE PER 0.25 MG: Performed by: INTERNAL MEDICINE

## 2022-01-16 PROCEDURE — 86140 C-REACTIVE PROTEIN: CPT | Performed by: INTERNAL MEDICINE

## 2022-01-16 PROCEDURE — 25010000002 PROPOFOL 10 MG/ML EMULSION: Performed by: INTERNAL MEDICINE

## 2022-01-16 PROCEDURE — 85027 COMPLETE CBC AUTOMATED: CPT | Performed by: INTERNAL MEDICINE

## 2022-01-16 PROCEDURE — 82962 GLUCOSE BLOOD TEST: CPT

## 2022-01-16 PROCEDURE — 25010000002 ENOXAPARIN PER 10 MG: Performed by: INTERNAL MEDICINE

## 2022-01-16 PROCEDURE — 71045 X-RAY EXAM CHEST 1 VIEW: CPT

## 2022-01-16 PROCEDURE — 94003 VENT MGMT INPAT SUBQ DAY: CPT

## 2022-01-16 PROCEDURE — 25010000002 MIDAZOLAM 50 MG/10ML SOLUTION 10 ML VIAL: Performed by: INTERNAL MEDICINE

## 2022-01-16 RX ORDER — PANTOPRAZOLE SODIUM 40 MG/10ML
40 INJECTION, POWDER, LYOPHILIZED, FOR SOLUTION INTRAVENOUS EVERY 12 HOURS SCHEDULED
Status: DISCONTINUED | OUTPATIENT
Start: 2022-01-16 | End: 2022-02-09 | Stop reason: HOSPADM

## 2022-01-16 RX ORDER — BISACODYL 10 MG
10 SUPPOSITORY, RECTAL RECTAL DAILY PRN
Status: DISCONTINUED | OUTPATIENT
Start: 2022-01-16 | End: 2022-02-09 | Stop reason: HOSPADM

## 2022-01-16 RX ORDER — AMOXICILLIN 250 MG
1 CAPSULE ORAL 2 TIMES DAILY
Status: DISCONTINUED | OUTPATIENT
Start: 2022-01-16 | End: 2022-01-28

## 2022-01-16 RX ORDER — POLYETHYLENE GLYCOL 3350 17 G/17G
17 POWDER, FOR SOLUTION ORAL DAILY
Status: DISCONTINUED | OUTPATIENT
Start: 2022-01-16 | End: 2022-01-28

## 2022-01-16 RX ADMIN — GUAIFENESIN 400 MG: 200 SOLUTION ORAL at 00:24

## 2022-01-16 RX ADMIN — TAZOBACTAM SODIUM AND PIPERACILLIN SODIUM 4.5 G: 500; 4 INJECTION, SOLUTION INTRAVENOUS at 16:08

## 2022-01-16 RX ADMIN — PROPOFOL 70 MCG/KG/MIN: 10 INJECTION, EMULSION INTRAVENOUS at 20:29

## 2022-01-16 RX ADMIN — PROPOFOL 70 MCG/KG/MIN: 10 INJECTION, EMULSION INTRAVENOUS at 09:37

## 2022-01-16 RX ADMIN — DEXMEDETOMIDINE HYDROCHLORIDE 0.6 MCG/KG/HR: 4 INJECTION, SOLUTION INTRAVENOUS at 10:59

## 2022-01-16 RX ADMIN — PROPOFOL 75 MCG/KG/MIN: 10 INJECTION, EMULSION INTRAVENOUS at 03:45

## 2022-01-16 RX ADMIN — ENOXAPARIN SODIUM 110 MG: 120 INJECTION SUBCUTANEOUS at 05:51

## 2022-01-16 RX ADMIN — OXYCODONE HYDROCHLORIDE AND ACETAMINOPHEN 500 MG: 500 TABLET ORAL at 09:31

## 2022-01-16 RX ADMIN — PROPOFOL 70 MCG/KG/MIN: 10 INJECTION, EMULSION INTRAVENOUS at 13:51

## 2022-01-16 RX ADMIN — TAZOBACTAM SODIUM AND PIPERACILLIN SODIUM 4.5 G: 500; 4 INJECTION, SOLUTION INTRAVENOUS at 00:24

## 2022-01-16 RX ADMIN — PANTOPRAZOLE SODIUM 40 MG: 40 INJECTION, POWDER, FOR SOLUTION INTRAVENOUS at 09:51

## 2022-01-16 RX ADMIN — ATORVASTATIN CALCIUM 20 MG: 10 TABLET, FILM COATED ORAL at 20:29

## 2022-01-16 RX ADMIN — PROPOFOL 75 MCG/KG/MIN: 10 INJECTION, EMULSION INTRAVENOUS at 02:18

## 2022-01-16 RX ADMIN — DEXMEDETOMIDINE HYDROCHLORIDE 0.5 MCG/KG/HR: 4 INJECTION, SOLUTION INTRAVENOUS at 17:58

## 2022-01-16 RX ADMIN — ALBUTEROL SULFATE 6 PUFF: 90 AEROSOL, METERED RESPIRATORY (INHALATION) at 10:24

## 2022-01-16 RX ADMIN — PROPOFOL 70 MCG/KG/MIN: 10 INJECTION, EMULSION INTRAVENOUS at 17:58

## 2022-01-16 RX ADMIN — PROPOFOL 75 MCG/KG/MIN: 10 INJECTION, EMULSION INTRAVENOUS at 06:04

## 2022-01-16 RX ADMIN — PROPOFOL 75 MCG/KG/MIN: 10 INJECTION, EMULSION INTRAVENOUS at 08:07

## 2022-01-16 RX ADMIN — PROPOFOL 70 MCG/KG/MIN: 10 INJECTION, EMULSION INTRAVENOUS at 18:57

## 2022-01-16 RX ADMIN — PROPOFOL 70 MCG/KG/MIN: 10 INJECTION, EMULSION INTRAVENOUS at 16:00

## 2022-01-16 RX ADMIN — ALBUTEROL SULFATE 6 PUFF: 90 AEROSOL, METERED RESPIRATORY (INHALATION) at 06:23

## 2022-01-16 RX ADMIN — PROPOFOL 75 MCG/KG/MIN: 10 INJECTION, EMULSION INTRAVENOUS at 00:24

## 2022-01-16 RX ADMIN — PROPOFOL 70 MCG/KG/MIN: 10 INJECTION, EMULSION INTRAVENOUS at 11:54

## 2022-01-16 RX ADMIN — GUAIFENESIN 400 MG: 200 SOLUTION ORAL at 09:28

## 2022-01-16 RX ADMIN — MIDAZOLAM 1 MG/HR: 5 INJECTION, SOLUTION INTRAMUSCULAR; INTRAVENOUS at 20:30

## 2022-01-16 RX ADMIN — PROPOFOL 70 MCG/KG/MIN: 10 INJECTION, EMULSION INTRAVENOUS at 22:53

## 2022-01-16 RX ADMIN — POLYETHYLENE GLYCOL 3350 17 G: 17 POWDER, FOR SOLUTION ORAL at 09:33

## 2022-01-16 RX ADMIN — ALBUTEROL SULFATE 6 PUFF: 90 AEROSOL, METERED RESPIRATORY (INHALATION) at 15:01

## 2022-01-16 RX ADMIN — BUDESONIDE AND FORMOTEROL FUMARATE DIHYDRATE 2 PUFF: 160; 4.5 AEROSOL RESPIRATORY (INHALATION) at 20:12

## 2022-01-16 RX ADMIN — ALBUTEROL SULFATE 6 PUFF: 90 AEROSOL, METERED RESPIRATORY (INHALATION) at 20:12

## 2022-01-16 RX ADMIN — DEXAMETHASONE 6 MG: 4 TABLET ORAL at 08:58

## 2022-01-16 RX ADMIN — ENOXAPARIN SODIUM 100 MG: 100 INJECTION SUBCUTANEOUS at 17:59

## 2022-01-16 RX ADMIN — GUAIFENESIN 400 MG: 200 SOLUTION ORAL at 16:10

## 2022-01-16 RX ADMIN — ZINC SULFATE 220 MG (50 MG) CAPSULE 220 MG: CAPSULE at 09:30

## 2022-01-16 RX ADMIN — DOCUSATE SODIUM 50 MG AND SENNOSIDES 8.6 MG 1 TABLET: 8.6; 5 TABLET, FILM COATED ORAL at 20:29

## 2022-01-16 RX ADMIN — TAZOBACTAM SODIUM AND PIPERACILLIN SODIUM 4.5 G: 500; 4 INJECTION, SOLUTION INTRAVENOUS at 06:34

## 2022-01-16 RX ADMIN — THIAMINE HCL TAB 100 MG 100 MG: 100 TAB at 09:29

## 2022-01-16 RX ADMIN — DEXMEDETOMIDINE HYDROCHLORIDE 0.6 MCG/KG/HR: 4 INJECTION, SOLUTION INTRAVENOUS at 03:45

## 2022-01-16 RX ADMIN — DOCUSATE SODIUM 50 MG AND SENNOSIDES 8.6 MG 1 TABLET: 8.6; 5 TABLET, FILM COATED ORAL at 09:54

## 2022-01-16 RX ADMIN — PANTOPRAZOLE SODIUM 40 MG: 40 INJECTION, POWDER, FOR SOLUTION INTRAVENOUS at 20:30

## 2022-01-16 RX ADMIN — Medication 1000 UNITS: at 09:30

## 2022-01-16 RX ADMIN — BUDESONIDE AND FORMOTEROL FUMARATE DIHYDRATE 2 PUFF: 160; 4.5 AEROSOL RESPIRATORY (INHALATION) at 06:23

## 2022-01-17 LAB
ANION GAP SERPL CALCULATED.3IONS-SCNC: 11 MMOL/L (ref 5–15)
ARTERIAL PATENCY WRIST A: ABNORMAL
ATMOSPHERIC PRESS: 746 MMHG
BACTERIA SPEC AEROBE CULT: NORMAL
BACTERIA SPEC AEROBE CULT: NORMAL
BACTERIA UR QL AUTO: ABNORMAL /HPF
BASE EXCESS BLDA CALC-SCNC: 4.5 MMOL/L (ref 0–2)
BDY SITE: ABNORMAL
BILIRUB UR QL STRIP: NEGATIVE
BODY TEMPERATURE: 37 C
BUN SERPL-MCNC: 28 MG/DL (ref 6–20)
BUN/CREAT SERPL: 41.2 (ref 7–25)
CALCIUM SPEC-SCNC: 8.2 MG/DL (ref 8.6–10.5)
CHLORIDE SERPL-SCNC: 100 MMOL/L (ref 98–107)
CLARITY UR: CLEAR
CO2 SERPL-SCNC: 25 MMOL/L (ref 22–29)
COLOR UR: ABNORMAL
CREAT SERPL-MCNC: 0.68 MG/DL (ref 0.76–1.27)
DEPRECATED RDW RBC AUTO: 46 FL (ref 37–54)
ERYTHROCYTE [DISTWIDTH] IN BLOOD BY AUTOMATED COUNT: 16.4 % (ref 12.3–15.4)
GFR SERPL CREATININE-BSD FRML MDRD: 124 ML/MIN/1.73
GLUCOSE BLDC GLUCOMTR-MCNC: 77 MG/DL (ref 70–130)
GLUCOSE BLDC GLUCOMTR-MCNC: 87 MG/DL (ref 70–130)
GLUCOSE BLDC GLUCOMTR-MCNC: 98 MG/DL (ref 70–130)
GLUCOSE BLDC GLUCOMTR-MCNC: 99 MG/DL (ref 70–130)
GLUCOSE SERPL-MCNC: 96 MG/DL (ref 65–99)
GLUCOSE UR STRIP-MCNC: NEGATIVE MG/DL
HCO3 BLDA-SCNC: 28.5 MMOL/L (ref 20–26)
HCT VFR BLD AUTO: 35.9 % (ref 37.5–51)
HGB BLD-MCNC: 10.8 G/DL (ref 13–17.7)
HGB UR QL STRIP.AUTO: ABNORMAL
HYALINE CASTS UR QL AUTO: ABNORMAL /LPF
INHALED O2 CONCENTRATION: 40 %
KETONES UR QL STRIP: NEGATIVE
LEUKOCYTE ESTERASE UR QL STRIP.AUTO: ABNORMAL
Lab: ABNORMAL
MCH RBC QN AUTO: 24.5 PG (ref 26.6–33)
MCHC RBC AUTO-ENTMCNC: 30.1 G/DL (ref 31.5–35.7)
MCV RBC AUTO: 81.4 FL (ref 79–97)
MODALITY: ABNORMAL
NITRITE UR QL STRIP: NEGATIVE
PAW @ PEAK INSP FLOW SETTING VENT: 26 CMH2O
PCO2 BLDA: 39.2 MM HG (ref 35–45)
PCO2 TEMP ADJ BLD: 39.2 MM HG (ref 35–45)
PEEP RESPIRATORY: 8 CM[H2O]
PH BLDA: 7.47 PH UNITS (ref 7.35–7.45)
PH UR STRIP.AUTO: 5.5 [PH] (ref 5–8)
PH, TEMP CORRECTED: 7.47 PH UNITS (ref 7.35–7.45)
PLATELET # BLD AUTO: 381 10*3/MM3 (ref 140–450)
PMV BLD AUTO: 11.1 FL (ref 6–12)
PO2 BLDA: 71.8 MM HG (ref 83–108)
PO2 TEMP ADJ BLD: 71.8 MM HG (ref 83–108)
POTASSIUM SERPL-SCNC: 4.2 MMOL/L (ref 3.5–5.2)
PROT UR QL STRIP: NEGATIVE
RBC # BLD AUTO: 4.41 10*6/MM3 (ref 4.14–5.8)
RBC # UR STRIP: ABNORMAL /HPF
REF LAB TEST METHOD: ABNORMAL
SAO2 % BLDCOA: 94.9 % (ref 94–99)
SET MECH RESP RATE: 34
SODIUM SERPL-SCNC: 136 MMOL/L (ref 136–145)
SP GR UR STRIP: 1.02 (ref 1–1.03)
SQUAMOUS #/AREA URNS HPF: ABNORMAL /HPF
UROBILINOGEN UR QL STRIP: ABNORMAL
VENTILATOR MODE: ABNORMAL
WBC # UR STRIP: ABNORMAL /HPF
WBC NRBC COR # BLD: 9.55 10*3/MM3 (ref 3.4–10.8)

## 2022-01-17 PROCEDURE — 87040 BLOOD CULTURE FOR BACTERIA: CPT | Performed by: INTERNAL MEDICINE

## 2022-01-17 PROCEDURE — 25010000002 MORPHINE PER 10 MG: Performed by: INTERNAL MEDICINE

## 2022-01-17 PROCEDURE — 94799 UNLISTED PULMONARY SVC/PX: CPT

## 2022-01-17 PROCEDURE — 25010000002 ENOXAPARIN PER 10 MG: Performed by: INTERNAL MEDICINE

## 2022-01-17 PROCEDURE — 99233 SBSQ HOSP IP/OBS HIGH 50: CPT | Performed by: INTERNAL MEDICINE

## 2022-01-17 PROCEDURE — 63710000001 DEXAMETHASONE PER 0.25 MG: Performed by: INTERNAL MEDICINE

## 2022-01-17 PROCEDURE — 82962 GLUCOSE BLOOD TEST: CPT

## 2022-01-17 PROCEDURE — 25010000002 PROPOFOL 10 MG/ML EMULSION: Performed by: INTERNAL MEDICINE

## 2022-01-17 PROCEDURE — 82803 BLOOD GASES ANY COMBINATION: CPT

## 2022-01-17 PROCEDURE — 80048 BASIC METABOLIC PNL TOTAL CA: CPT | Performed by: INTERNAL MEDICINE

## 2022-01-17 PROCEDURE — 85027 COMPLETE CBC AUTOMATED: CPT | Performed by: INTERNAL MEDICINE

## 2022-01-17 PROCEDURE — 25010000002 PIPERACILLIN SOD-TAZOBACTAM PER 1 G: Performed by: INTERNAL MEDICINE

## 2022-01-17 PROCEDURE — 81001 URINALYSIS AUTO W/SCOPE: CPT | Performed by: INTERNAL MEDICINE

## 2022-01-17 RX ADMIN — POLYETHYLENE GLYCOL 3350 17 G: 17 POWDER, FOR SOLUTION ORAL at 09:17

## 2022-01-17 RX ADMIN — PROPOFOL 70 MCG/KG/MIN: 10 INJECTION, EMULSION INTRAVENOUS at 04:41

## 2022-01-17 RX ADMIN — DOCUSATE SODIUM 50 MG AND SENNOSIDES 8.6 MG 1 TABLET: 8.6; 5 TABLET, FILM COATED ORAL at 09:18

## 2022-01-17 RX ADMIN — DEXMEDETOMIDINE HYDROCHLORIDE 0.5 MCG/KG/HR: 4 INJECTION, SOLUTION INTRAVENOUS at 09:28

## 2022-01-17 RX ADMIN — PROPOFOL 65 MCG/KG/MIN: 10 INJECTION, EMULSION INTRAVENOUS at 12:35

## 2022-01-17 RX ADMIN — DEXMEDETOMIDINE HYDROCHLORIDE 0.5 MCG/KG/HR: 4 INJECTION, SOLUTION INTRAVENOUS at 16:48

## 2022-01-17 RX ADMIN — DEXAMETHASONE 6 MG: 4 TABLET ORAL at 09:18

## 2022-01-17 RX ADMIN — ENOXAPARIN SODIUM 100 MG: 100 INJECTION SUBCUTANEOUS at 17:04

## 2022-01-17 RX ADMIN — PANTOPRAZOLE SODIUM 40 MG: 40 INJECTION, POWDER, FOR SOLUTION INTRAVENOUS at 09:17

## 2022-01-17 RX ADMIN — PROPOFOL 70 MCG/KG/MIN: 10 INJECTION, EMULSION INTRAVENOUS at 02:51

## 2022-01-17 RX ADMIN — ALBUTEROL SULFATE 6 PUFF: 90 AEROSOL, METERED RESPIRATORY (INHALATION) at 10:47

## 2022-01-17 RX ADMIN — ZINC SULFATE 220 MG (50 MG) CAPSULE 220 MG: CAPSULE at 09:17

## 2022-01-17 RX ADMIN — DEXMEDETOMIDINE HYDROCHLORIDE 0.5 MCG/KG/HR: 4 INJECTION, SOLUTION INTRAVENOUS at 01:52

## 2022-01-17 RX ADMIN — TAZOBACTAM SODIUM AND PIPERACILLIN SODIUM 4.5 G: 500; 4 INJECTION, SOLUTION INTRAVENOUS at 06:34

## 2022-01-17 RX ADMIN — GUAIFENESIN 400 MG: 200 SOLUTION ORAL at 00:12

## 2022-01-17 RX ADMIN — THIAMINE HCL TAB 100 MG 100 MG: 100 TAB at 09:17

## 2022-01-17 RX ADMIN — SODIUM CHLORIDE, PRESERVATIVE FREE 10 ML: 5 INJECTION INTRAVENOUS at 09:17

## 2022-01-17 RX ADMIN — PROPOFOL 70 MCG/KG/MIN: 10 INJECTION, EMULSION INTRAVENOUS at 10:07

## 2022-01-17 RX ADMIN — PANTOPRAZOLE SODIUM 40 MG: 40 INJECTION, POWDER, FOR SOLUTION INTRAVENOUS at 20:03

## 2022-01-17 RX ADMIN — ENOXAPARIN SODIUM 100 MG: 100 INJECTION SUBCUTANEOUS at 06:10

## 2022-01-17 RX ADMIN — GUAIFENESIN 400 MG: 200 SOLUTION ORAL at 17:04

## 2022-01-17 RX ADMIN — Medication 1000 UNITS: at 09:18

## 2022-01-17 RX ADMIN — BUDESONIDE AND FORMOTEROL FUMARATE DIHYDRATE 2 PUFF: 160; 4.5 AEROSOL RESPIRATORY (INHALATION) at 07:09

## 2022-01-17 RX ADMIN — TAZOBACTAM SODIUM AND PIPERACILLIN SODIUM 4.5 G: 500; 4 INJECTION, SOLUTION INTRAVENOUS at 00:12

## 2022-01-17 RX ADMIN — OXYCODONE HYDROCHLORIDE AND ACETAMINOPHEN 500 MG: 500 TABLET ORAL at 09:18

## 2022-01-17 RX ADMIN — PROPOFOL 65 MCG/KG/MIN: 10 INJECTION, EMULSION INTRAVENOUS at 22:46

## 2022-01-17 RX ADMIN — ALBUTEROL SULFATE 6 PUFF: 90 AEROSOL, METERED RESPIRATORY (INHALATION) at 14:35

## 2022-01-17 RX ADMIN — BUDESONIDE AND FORMOTEROL FUMARATE DIHYDRATE 2 PUFF: 160; 4.5 AEROSOL RESPIRATORY (INHALATION) at 19:45

## 2022-01-17 RX ADMIN — ATORVASTATIN CALCIUM 20 MG: 10 TABLET, FILM COATED ORAL at 20:03

## 2022-01-17 RX ADMIN — PROPOFOL 65 MCG/KG/MIN: 10 INJECTION, EMULSION INTRAVENOUS at 20:01

## 2022-01-17 RX ADMIN — GUAIFENESIN 400 MG: 200 SOLUTION ORAL at 09:17

## 2022-01-17 RX ADMIN — ALBUTEROL SULFATE 6 PUFF: 90 AEROSOL, METERED RESPIRATORY (INHALATION) at 19:44

## 2022-01-17 RX ADMIN — ACETAMINOPHEN 650 MG: 650 SUPPOSITORY RECTAL at 14:34

## 2022-01-17 RX ADMIN — DOCUSATE SODIUM 50 MG AND SENNOSIDES 8.6 MG 1 TABLET: 8.6; 5 TABLET, FILM COATED ORAL at 20:03

## 2022-01-17 RX ADMIN — PROPOFOL 70 MCG/KG/MIN: 10 INJECTION, EMULSION INTRAVENOUS at 00:48

## 2022-01-17 RX ADMIN — MORPHINE SULFATE 3 MG: 4 INJECTION INTRAVENOUS at 00:48

## 2022-01-17 RX ADMIN — PROPOFOL 70 MCG/KG/MIN: 10 INJECTION, EMULSION INTRAVENOUS at 06:49

## 2022-01-17 RX ADMIN — PROPOFOL 70 MCG/KG/MIN: 10 INJECTION, EMULSION INTRAVENOUS at 08:26

## 2022-01-17 RX ADMIN — PROPOFOL 60 MCG/KG/MIN: 10 INJECTION, EMULSION INTRAVENOUS at 15:05

## 2022-01-17 RX ADMIN — ALBUTEROL SULFATE 6 PUFF: 90 AEROSOL, METERED RESPIRATORY (INHALATION) at 07:09

## 2022-01-17 RX ADMIN — PROPOFOL 65 MCG/KG/MIN: 10 INJECTION, EMULSION INTRAVENOUS at 17:09

## 2022-01-18 LAB
ALBUMIN SERPL-MCNC: 2.9 G/DL (ref 3.5–5.2)
ALBUMIN/GLOB SERPL: 0.6 G/DL
ALP SERPL-CCNC: 92 U/L (ref 39–117)
ALT SERPL W P-5'-P-CCNC: 55 U/L (ref 1–41)
ANION GAP SERPL CALCULATED.3IONS-SCNC: 10 MMOL/L (ref 5–15)
ARTERIAL PATENCY WRIST A: ABNORMAL
AST SERPL-CCNC: 47 U/L (ref 1–40)
ATMOSPHERIC PRESS: 752 MMHG
BASE EXCESS BLDA CALC-SCNC: 3.6 MMOL/L (ref 0–2)
BDY SITE: ABNORMAL
BILIRUB SERPL-MCNC: 0.9 MG/DL (ref 0–1.2)
BODY TEMPERATURE: 37 C
BUN SERPL-MCNC: 28 MG/DL (ref 6–20)
BUN/CREAT SERPL: 34.6 (ref 7–25)
CALCIUM SPEC-SCNC: 8.5 MG/DL (ref 8.6–10.5)
CHLORIDE SERPL-SCNC: 99 MMOL/L (ref 98–107)
CLUMPED PLATELETS: PRESENT
CO2 SERPL-SCNC: 27 MMOL/L (ref 22–29)
CREAT SERPL-MCNC: 0.81 MG/DL (ref 0.76–1.27)
CRP SERPL-MCNC: 14.87 MG/DL (ref 0–0.5)
DEPRECATED RDW RBC AUTO: 45.2 FL (ref 37–54)
EOSINOPHIL # BLD MANUAL: 0.22 10*3/MM3 (ref 0–0.4)
EOSINOPHIL NFR BLD MANUAL: 2.1 % (ref 0.3–6.2)
ERYTHROCYTE [DISTWIDTH] IN BLOOD BY AUTOMATED COUNT: 17.1 % (ref 12.3–15.4)
GFR SERPL CREATININE-BSD FRML MDRD: 101 ML/MIN/1.73
GIANT PLATELETS: ABNORMAL
GLOBULIN UR ELPH-MCNC: 4.9 GM/DL
GLUCOSE BLDC GLUCOMTR-MCNC: 104 MG/DL (ref 70–130)
GLUCOSE BLDC GLUCOMTR-MCNC: 119 MG/DL (ref 70–130)
GLUCOSE BLDC GLUCOMTR-MCNC: 86 MG/DL (ref 70–130)
GLUCOSE BLDC GLUCOMTR-MCNC: 92 MG/DL (ref 70–130)
GLUCOSE SERPL-MCNC: 90 MG/DL (ref 65–99)
HCO3 BLDA-SCNC: 27.5 MMOL/L (ref 20–26)
HCT VFR BLD AUTO: 37.3 % (ref 37.5–51)
HGB BLD-MCNC: 11.2 G/DL (ref 13–17.7)
INHALED O2 CONCENTRATION: 40 %
LYMPHOCYTES # BLD MANUAL: 1.82 10*3/MM3 (ref 0.7–3.1)
LYMPHOCYTES NFR BLD MANUAL: 7.2 % (ref 5–12)
Lab: ABNORMAL
MCH RBC QN AUTO: 24.4 PG (ref 26.6–33)
MCHC RBC AUTO-ENTMCNC: 30 G/DL (ref 31.5–35.7)
MCV RBC AUTO: 81.3 FL (ref 79–97)
METAMYELOCYTES NFR BLD MANUAL: 1 % (ref 0–0)
MODALITY: ABNORMAL
MONOCYTES # BLD: 0.75 10*3/MM3 (ref 0.1–0.9)
MYELOCYTES NFR BLD MANUAL: 1 % (ref 0–0)
NEUTROPHILS # BLD AUTO: 7.4 10*3/MM3 (ref 1.7–7)
NEUTROPHILS NFR BLD MANUAL: 70.1 % (ref 42.7–76)
NEUTS BAND NFR BLD MANUAL: 1 % (ref 0–5)
PAW @ PEAK INSP FLOW SETTING VENT: 18 CMH2O
PCO2 BLDA: 38.3 MM HG (ref 35–45)
PCO2 TEMP ADJ BLD: 38.3 MM HG (ref 35–45)
PEEP RESPIRATORY: 8 CM[H2O]
PH BLDA: 7.46 PH UNITS (ref 7.35–7.45)
PH, TEMP CORRECTED: 7.46 PH UNITS (ref 7.35–7.45)
PLATELET # BLD AUTO: 467 10*3/MM3 (ref 140–450)
PMV BLD AUTO: 10.9 FL (ref 6–12)
PO2 BLDA: 76.2 MM HG (ref 83–108)
PO2 TEMP ADJ BLD: 76.2 MM HG (ref 83–108)
POTASSIUM SERPL-SCNC: 4.4 MMOL/L (ref 3.5–5.2)
PROCALCITONIN SERPL-MCNC: 0.2 NG/ML (ref 0–0.25)
PROT SERPL-MCNC: 7.8 G/DL (ref 6–8.5)
RBC # BLD AUTO: 4.59 10*6/MM3 (ref 4.14–5.8)
RBC MORPH BLD: NORMAL
SAO2 % BLDCOA: 95.4 % (ref 94–99)
SET MECH RESP RATE: 30
SMALL PLATELETS BLD QL SMEAR: ABNORMAL
SODIUM SERPL-SCNC: 136 MMOL/L (ref 136–145)
VARIANT LYMPHS NFR BLD MANUAL: 11.3 % (ref 19.6–45.3)
VARIANT LYMPHS NFR BLD MANUAL: 6.2 % (ref 0–5)
VENTILATOR MODE: ABNORMAL
WBC MORPH BLD: NORMAL
WBC NRBC COR # BLD: 10.4 10*3/MM3 (ref 3.4–10.8)

## 2022-01-18 PROCEDURE — 0T9B70Z DRAINAGE OF BLADDER WITH DRAINAGE DEVICE, VIA NATURAL OR ARTIFICIAL OPENING: ICD-10-PCS | Performed by: INTERNAL MEDICINE

## 2022-01-18 PROCEDURE — 87205 SMEAR GRAM STAIN: CPT | Performed by: INTERNAL MEDICINE

## 2022-01-18 PROCEDURE — 85007 BL SMEAR W/DIFF WBC COUNT: CPT | Performed by: INTERNAL MEDICINE

## 2022-01-18 PROCEDURE — 85025 COMPLETE CBC W/AUTO DIFF WBC: CPT | Performed by: INTERNAL MEDICINE

## 2022-01-18 PROCEDURE — 87070 CULTURE OTHR SPECIMN AEROBIC: CPT | Performed by: INTERNAL MEDICINE

## 2022-01-18 PROCEDURE — 25010000002 PROPOFOL 10 MG/ML EMULSION: Performed by: INTERNAL MEDICINE

## 2022-01-18 PROCEDURE — 51702 INSERT TEMP BLADDER CATH: CPT

## 2022-01-18 PROCEDURE — 25010000002 ENOXAPARIN PER 10 MG: Performed by: INTERNAL MEDICINE

## 2022-01-18 PROCEDURE — 94799 UNLISTED PULMONARY SVC/PX: CPT

## 2022-01-18 PROCEDURE — 82962 GLUCOSE BLOOD TEST: CPT

## 2022-01-18 PROCEDURE — 87186 SC STD MICRODIL/AGAR DIL: CPT | Performed by: INTERNAL MEDICINE

## 2022-01-18 PROCEDURE — 99233 SBSQ HOSP IP/OBS HIGH 50: CPT | Performed by: INTERNAL MEDICINE

## 2022-01-18 PROCEDURE — 84145 PROCALCITONIN (PCT): CPT | Performed by: INTERNAL MEDICINE

## 2022-01-18 PROCEDURE — 80053 COMPREHEN METABOLIC PANEL: CPT | Performed by: INTERNAL MEDICINE

## 2022-01-18 PROCEDURE — 82803 BLOOD GASES ANY COMBINATION: CPT

## 2022-01-18 PROCEDURE — 25010000002 MORPHINE PER 10 MG: Performed by: INTERNAL MEDICINE

## 2022-01-18 PROCEDURE — 63710000001 DEXAMETHASONE PER 0.25 MG: Performed by: INTERNAL MEDICINE

## 2022-01-18 PROCEDURE — 94003 VENT MGMT INPAT SUBQ DAY: CPT

## 2022-01-18 PROCEDURE — 87077 CULTURE AEROBIC IDENTIFY: CPT | Performed by: INTERNAL MEDICINE

## 2022-01-18 PROCEDURE — 86140 C-REACTIVE PROTEIN: CPT | Performed by: INTERNAL MEDICINE

## 2022-01-18 RX ADMIN — PROPOFOL 65 MCG/KG/MIN: 10 INJECTION, EMULSION INTRAVENOUS at 05:28

## 2022-01-18 RX ADMIN — DOCUSATE SODIUM 50 MG AND SENNOSIDES 8.6 MG 1 TABLET: 8.6; 5 TABLET, FILM COATED ORAL at 20:01

## 2022-01-18 RX ADMIN — DEXMEDETOMIDINE HYDROCHLORIDE 0.8 MCG/KG/HR: 4 INJECTION, SOLUTION INTRAVENOUS at 11:44

## 2022-01-18 RX ADMIN — DEXMEDETOMIDINE HYDROCHLORIDE 1 MCG/KG/HR: 4 INJECTION, SOLUTION INTRAVENOUS at 20:00

## 2022-01-18 RX ADMIN — ZINC SULFATE 220 MG (50 MG) CAPSULE 220 MG: CAPSULE at 09:23

## 2022-01-18 RX ADMIN — PANTOPRAZOLE SODIUM 40 MG: 40 INJECTION, POWDER, FOR SOLUTION INTRAVENOUS at 20:02

## 2022-01-18 RX ADMIN — DOCUSATE SODIUM 50 MG AND SENNOSIDES 8.6 MG 1 TABLET: 8.6; 5 TABLET, FILM COATED ORAL at 09:24

## 2022-01-18 RX ADMIN — ENOXAPARIN SODIUM 100 MG: 100 INJECTION SUBCUTANEOUS at 05:28

## 2022-01-18 RX ADMIN — THIAMINE HCL TAB 100 MG 100 MG: 100 TAB at 09:23

## 2022-01-18 RX ADMIN — ATORVASTATIN CALCIUM 20 MG: 10 TABLET, FILM COATED ORAL at 20:01

## 2022-01-18 RX ADMIN — DEXMEDETOMIDINE HYDROCHLORIDE 0.6 MCG/KG/HR: 4 INJECTION, SOLUTION INTRAVENOUS at 05:28

## 2022-01-18 RX ADMIN — PROPOFOL 70 MCG/KG/MIN: 10 INJECTION, EMULSION INTRAVENOUS at 11:44

## 2022-01-18 RX ADMIN — PROPOFOL 70 MCG/KG/MIN: 10 INJECTION, EMULSION INTRAVENOUS at 09:18

## 2022-01-18 RX ADMIN — DEXMEDETOMIDINE HYDROCHLORIDE 0.5 MCG/KG/HR: 4 INJECTION, SOLUTION INTRAVENOUS at 00:39

## 2022-01-18 RX ADMIN — ALBUTEROL SULFATE 6 PUFF: 90 AEROSOL, METERED RESPIRATORY (INHALATION) at 19:50

## 2022-01-18 RX ADMIN — ALBUTEROL SULFATE 6 PUFF: 90 AEROSOL, METERED RESPIRATORY (INHALATION) at 11:44

## 2022-01-18 RX ADMIN — PROPOFOL 60 MCG/KG/MIN: 10 INJECTION, EMULSION INTRAVENOUS at 18:38

## 2022-01-18 RX ADMIN — PROPOFOL 65 MCG/KG/MIN: 10 INJECTION, EMULSION INTRAVENOUS at 15:57

## 2022-01-18 RX ADMIN — ALBUTEROL SULFATE 6 PUFF: 90 AEROSOL, METERED RESPIRATORY (INHALATION) at 15:03

## 2022-01-18 RX ADMIN — SODIUM CHLORIDE, PRESERVATIVE FREE 10 ML: 5 INJECTION INTRAVENOUS at 09:23

## 2022-01-18 RX ADMIN — ENOXAPARIN SODIUM 100 MG: 100 INJECTION SUBCUTANEOUS at 18:38

## 2022-01-18 RX ADMIN — POLYETHYLENE GLYCOL 3350 17 G: 17 POWDER, FOR SOLUTION ORAL at 09:23

## 2022-01-18 RX ADMIN — PROPOFOL 60 MCG/KG/MIN: 10 INJECTION, EMULSION INTRAVENOUS at 20:21

## 2022-01-18 RX ADMIN — Medication 1000 UNITS: at 09:24

## 2022-01-18 RX ADMIN — BUDESONIDE AND FORMOTEROL FUMARATE DIHYDRATE 2 PUFF: 160; 4.5 AEROSOL RESPIRATORY (INHALATION) at 07:10

## 2022-01-18 RX ADMIN — GUAIFENESIN 400 MG: 200 SOLUTION ORAL at 01:10

## 2022-01-18 RX ADMIN — BUDESONIDE AND FORMOTEROL FUMARATE DIHYDRATE 2 PUFF: 160; 4.5 AEROSOL RESPIRATORY (INHALATION) at 19:50

## 2022-01-18 RX ADMIN — GUAIFENESIN 400 MG: 200 SOLUTION ORAL at 09:23

## 2022-01-18 RX ADMIN — PROPOFOL 60 MCG/KG/MIN: 10 INJECTION, EMULSION INTRAVENOUS at 23:59

## 2022-01-18 RX ADMIN — MORPHINE SULFATE 3 MG: 4 INJECTION INTRAVENOUS at 10:47

## 2022-01-18 RX ADMIN — PROPOFOL 70 MCG/KG/MIN: 10 INJECTION, EMULSION INTRAVENOUS at 13:29

## 2022-01-18 RX ADMIN — ALBUTEROL SULFATE 6 PUFF: 90 AEROSOL, METERED RESPIRATORY (INHALATION) at 07:10

## 2022-01-18 RX ADMIN — OXYCODONE HYDROCHLORIDE AND ACETAMINOPHEN 500 MG: 500 TABLET ORAL at 09:24

## 2022-01-18 RX ADMIN — DEXMEDETOMIDINE HYDROCHLORIDE 1 MCG/KG/HR: 4 INJECTION, SOLUTION INTRAVENOUS at 23:59

## 2022-01-18 RX ADMIN — PROPOFOL 65 MCG/KG/MIN: 10 INJECTION, EMULSION INTRAVENOUS at 04:08

## 2022-01-18 RX ADMIN — GUAIFENESIN 400 MG: 200 SOLUTION ORAL at 16:00

## 2022-01-18 RX ADMIN — DEXMEDETOMIDINE HYDROCHLORIDE 1 MCG/KG/HR: 4 INJECTION, SOLUTION INTRAVENOUS at 16:04

## 2022-01-18 RX ADMIN — PROPOFOL 65 MCG/KG/MIN: 10 INJECTION, EMULSION INTRAVENOUS at 00:41

## 2022-01-18 RX ADMIN — DEXAMETHASONE 6 MG: 4 TABLET ORAL at 09:23

## 2022-01-18 RX ADMIN — PANTOPRAZOLE SODIUM 40 MG: 40 INJECTION, POWDER, FOR SOLUTION INTRAVENOUS at 09:23

## 2022-01-19 ENCOUNTER — APPOINTMENT (OUTPATIENT)
Dept: GENERAL RADIOLOGY | Facility: HOSPITAL | Age: 52
End: 2022-01-19

## 2022-01-19 LAB
ARTERIAL PATENCY WRIST A: ABNORMAL
ATMOSPHERIC PRESS: 749 MMHG
BASE EXCESS BLDA CALC-SCNC: 4.6 MMOL/L (ref 0–2)
BDY SITE: ABNORMAL
BODY TEMPERATURE: 37 C
GLUCOSE BLDC GLUCOMTR-MCNC: 102 MG/DL (ref 70–130)
GLUCOSE BLDC GLUCOMTR-MCNC: 92 MG/DL (ref 70–130)
HCO3 BLDA-SCNC: 28.3 MMOL/L (ref 20–26)
INHALED O2 CONCENTRATION: 35 %
Lab: ABNORMAL
MODALITY: ABNORMAL
PAW @ PEAK INSP FLOW SETTING VENT: 233 CMH2O
PCO2 BLDA: 37.8 MM HG (ref 35–45)
PCO2 TEMP ADJ BLD: 37.8 MM HG (ref 35–45)
PEEP RESPIRATORY: 7 CM[H2O]
PH BLDA: 7.48 PH UNITS (ref 7.35–7.45)
PH, TEMP CORRECTED: 7.48 PH UNITS (ref 7.35–7.45)
PO2 BLDA: 69.4 MM HG (ref 83–108)
PO2 TEMP ADJ BLD: 69.4 MM HG (ref 83–108)
SAO2 % BLDCOA: 95.1 % (ref 94–99)
SET MECH RESP RATE: 26
VENTILATOR MODE: ABNORMAL

## 2022-01-19 PROCEDURE — 94799 UNLISTED PULMONARY SVC/PX: CPT

## 2022-01-19 PROCEDURE — 25010000002 ENOXAPARIN PER 10 MG: Performed by: INTERNAL MEDICINE

## 2022-01-19 PROCEDURE — 74018 RADEX ABDOMEN 1 VIEW: CPT

## 2022-01-19 PROCEDURE — 63710000001 DEXAMETHASONE PER 0.25 MG: Performed by: INTERNAL MEDICINE

## 2022-01-19 PROCEDURE — 25010000002 METOCLOPRAMIDE PER 10 MG: Performed by: INTERNAL MEDICINE

## 2022-01-19 PROCEDURE — 82803 BLOOD GASES ANY COMBINATION: CPT

## 2022-01-19 PROCEDURE — 99233 SBSQ HOSP IP/OBS HIGH 50: CPT | Performed by: INTERNAL MEDICINE

## 2022-01-19 PROCEDURE — 25010000002 PROPOFOL 10 MG/ML EMULSION: Performed by: INTERNAL MEDICINE

## 2022-01-19 PROCEDURE — 25010000002 MORPHINE PER 10 MG: Performed by: INTERNAL MEDICINE

## 2022-01-19 PROCEDURE — 82962 GLUCOSE BLOOD TEST: CPT

## 2022-01-19 PROCEDURE — 25010000002 MIDAZOLAM 50 MG/10ML SOLUTION 10 ML VIAL: Performed by: INTERNAL MEDICINE

## 2022-01-19 PROCEDURE — 94003 VENT MGMT INPAT SUBQ DAY: CPT

## 2022-01-19 RX ORDER — LORAZEPAM 2 MG/ML
1 CONCENTRATE ORAL EVERY 8 HOURS SCHEDULED
Status: DISCONTINUED | OUTPATIENT
Start: 2022-01-19 | End: 2022-01-22

## 2022-01-19 RX ORDER — METOCLOPRAMIDE HYDROCHLORIDE 5 MG/ML
10 INJECTION INTRAMUSCULAR; INTRAVENOUS
Status: DISCONTINUED | OUTPATIENT
Start: 2022-01-19 | End: 2022-01-31

## 2022-01-19 RX ORDER — OXYCODONE HCL 5 MG/5 ML
5 SOLUTION, ORAL ORAL 3 TIMES DAILY
Status: DISCONTINUED | OUTPATIENT
Start: 2022-01-19 | End: 2022-01-25

## 2022-01-19 RX ADMIN — ZINC SULFATE 220 MG (50 MG) CAPSULE 220 MG: CAPSULE at 11:09

## 2022-01-19 RX ADMIN — DEXMEDETOMIDINE HYDROCHLORIDE 1.1 MCG/KG/HR: 4 INJECTION, SOLUTION INTRAVENOUS at 22:09

## 2022-01-19 RX ADMIN — PROPOFOL 60 MCG/KG/MIN: 10 INJECTION, EMULSION INTRAVENOUS at 18:22

## 2022-01-19 RX ADMIN — BUDESONIDE AND FORMOTEROL FUMARATE DIHYDRATE 2 PUFF: 160; 4.5 AEROSOL RESPIRATORY (INHALATION) at 18:49

## 2022-01-19 RX ADMIN — MIDAZOLAM 1 MG/HR: 5 INJECTION, SOLUTION INTRAMUSCULAR; INTRAVENOUS at 08:20

## 2022-01-19 RX ADMIN — GUAIFENESIN 400 MG: 200 SOLUTION ORAL at 11:09

## 2022-01-19 RX ADMIN — OXYCODONE HYDROCHLORIDE 5 MG: 5 SOLUTION ORAL at 21:00

## 2022-01-19 RX ADMIN — ALBUTEROL SULFATE 6 PUFF: 90 AEROSOL, METERED RESPIRATORY (INHALATION) at 11:22

## 2022-01-19 RX ADMIN — ENOXAPARIN SODIUM 100 MG: 100 INJECTION SUBCUTANEOUS at 18:21

## 2022-01-19 RX ADMIN — ATORVASTATIN CALCIUM 20 MG: 10 TABLET, FILM COATED ORAL at 20:59

## 2022-01-19 RX ADMIN — METOCLOPRAMIDE HYDROCHLORIDE 10 MG: 5 INJECTION INTRAMUSCULAR; INTRAVENOUS at 18:22

## 2022-01-19 RX ADMIN — PROPOFOL 60 MCG/KG/MIN: 10 INJECTION, EMULSION INTRAVENOUS at 07:27

## 2022-01-19 RX ADMIN — LORAZEPAM 1 MG: 2 SOLUTION, CONCENTRATE ORAL at 11:09

## 2022-01-19 RX ADMIN — MORPHINE SULFATE 3 MG: 4 INJECTION INTRAVENOUS at 21:35

## 2022-01-19 RX ADMIN — PROPOFOL 60 MCG/KG/MIN: 10 INJECTION, EMULSION INTRAVENOUS at 14:13

## 2022-01-19 RX ADMIN — DEXAMETHASONE 6 MG: 4 TABLET ORAL at 11:11

## 2022-01-19 RX ADMIN — PROPOFOL 60 MCG/KG/MIN: 10 INJECTION, EMULSION INTRAVENOUS at 21:01

## 2022-01-19 RX ADMIN — METOCLOPRAMIDE HYDROCHLORIDE 10 MG: 5 INJECTION INTRAMUSCULAR; INTRAVENOUS at 11:19

## 2022-01-19 RX ADMIN — GUAIFENESIN 400 MG: 200 SOLUTION ORAL at 01:00

## 2022-01-19 RX ADMIN — Medication 1000 UNITS: at 11:20

## 2022-01-19 RX ADMIN — DEXMEDETOMIDINE HYDROCHLORIDE 1 MCG/KG/HR: 4 INJECTION, SOLUTION INTRAVENOUS at 05:09

## 2022-01-19 RX ADMIN — DOCUSATE SODIUM 50 MG AND SENNOSIDES 8.6 MG 1 TABLET: 8.6; 5 TABLET, FILM COATED ORAL at 20:59

## 2022-01-19 RX ADMIN — PROPOFOL 60 MCG/KG/MIN: 10 INJECTION, EMULSION INTRAVENOUS at 05:09

## 2022-01-19 RX ADMIN — OXYCODONE HYDROCHLORIDE AND ACETAMINOPHEN 500 MG: 500 TABLET ORAL at 11:08

## 2022-01-19 RX ADMIN — PANTOPRAZOLE SODIUM 40 MG: 40 INJECTION, POWDER, FOR SOLUTION INTRAVENOUS at 21:01

## 2022-01-19 RX ADMIN — DOCUSATE SODIUM 50 MG AND SENNOSIDES 8.6 MG 1 TABLET: 8.6; 5 TABLET, FILM COATED ORAL at 11:08

## 2022-01-19 RX ADMIN — ALBUTEROL SULFATE 6 PUFF: 90 AEROSOL, METERED RESPIRATORY (INHALATION) at 16:03

## 2022-01-19 RX ADMIN — ALBUTEROL SULFATE 6 PUFF: 90 AEROSOL, METERED RESPIRATORY (INHALATION) at 18:49

## 2022-01-19 RX ADMIN — ALBUTEROL SULFATE 6 PUFF: 90 AEROSOL, METERED RESPIRATORY (INHALATION) at 06:45

## 2022-01-19 RX ADMIN — DEXMEDETOMIDINE HYDROCHLORIDE 1 MCG/KG/HR: 4 INJECTION, SOLUTION INTRAVENOUS at 11:11

## 2022-01-19 RX ADMIN — PROPOFOL 60 MCG/KG/MIN: 10 INJECTION, EMULSION INTRAVENOUS at 16:03

## 2022-01-19 RX ADMIN — POLYETHYLENE GLYCOL 3350 17 G: 17 POWDER, FOR SOLUTION ORAL at 11:09

## 2022-01-19 RX ADMIN — PROPOFOL 60 MCG/KG/MIN: 10 INJECTION, EMULSION INTRAVENOUS at 02:54

## 2022-01-19 RX ADMIN — GUAIFENESIN 400 MG: 200 SOLUTION ORAL at 18:21

## 2022-01-19 RX ADMIN — OXYCODONE HYDROCHLORIDE 5 MG: 5 SOLUTION ORAL at 16:03

## 2022-01-19 RX ADMIN — PROPOFOL 60 MCG/KG/MIN: 10 INJECTION, EMULSION INTRAVENOUS at 11:11

## 2022-01-19 RX ADMIN — DEXMEDETOMIDINE HYDROCHLORIDE 1 MCG/KG/HR: 4 INJECTION, SOLUTION INTRAVENOUS at 16:03

## 2022-01-19 RX ADMIN — THIAMINE HCL TAB 100 MG 100 MG: 100 TAB at 11:21

## 2022-01-19 RX ADMIN — LORAZEPAM 1 MG: 2 SOLUTION, CONCENTRATE ORAL at 21:02

## 2022-01-19 RX ADMIN — BUDESONIDE AND FORMOTEROL FUMARATE DIHYDRATE 2 PUFF: 160; 4.5 AEROSOL RESPIRATORY (INHALATION) at 06:45

## 2022-01-19 RX ADMIN — PANTOPRAZOLE SODIUM 40 MG: 40 INJECTION, POWDER, FOR SOLUTION INTRAVENOUS at 08:48

## 2022-01-19 RX ADMIN — DEXMEDETOMIDINE HYDROCHLORIDE 1 MCG/KG/HR: 4 INJECTION, SOLUTION INTRAVENOUS at 18:22

## 2022-01-19 RX ADMIN — OXYCODONE HYDROCHLORIDE 5 MG: 5 SOLUTION ORAL at 11:09

## 2022-01-19 RX ADMIN — METOCLOPRAMIDE HYDROCHLORIDE 10 MG: 5 INJECTION INTRAMUSCULAR; INTRAVENOUS at 21:00

## 2022-01-19 RX ADMIN — LORAZEPAM 1 MG: 2 SOLUTION, CONCENTRATE ORAL at 13:41

## 2022-01-19 RX ADMIN — DEXMEDETOMIDINE HYDROCHLORIDE 1 MCG/KG/HR: 4 INJECTION, SOLUTION INTRAVENOUS at 07:26

## 2022-01-19 RX ADMIN — ENOXAPARIN SODIUM 100 MG: 100 INJECTION SUBCUTANEOUS at 05:08

## 2022-01-20 ENCOUNTER — APPOINTMENT (OUTPATIENT)
Dept: GENERAL RADIOLOGY | Facility: HOSPITAL | Age: 52
End: 2022-01-20

## 2022-01-20 LAB
ARTERIAL PATENCY WRIST A: ABNORMAL
ATMOSPHERIC PRESS: 760 MMHG
BASE EXCESS BLDA CALC-SCNC: 4 MMOL/L (ref 0–2)
BDY SITE: ABNORMAL
BODY TEMPERATURE: 37 C
GLUCOSE BLDC GLUCOMTR-MCNC: 115 MG/DL (ref 70–130)
GLUCOSE BLDC GLUCOMTR-MCNC: 98 MG/DL (ref 70–130)
HCO3 BLDA-SCNC: 27.9 MMOL/L (ref 20–26)
INHALED O2 CONCENTRATION: 35 %
Lab: ABNORMAL
MODALITY: ABNORMAL
PAW @ PEAK INSP FLOW SETTING VENT: 20 CMH2O
PCO2 BLDA: 38.6 MM HG (ref 35–45)
PCO2 TEMP ADJ BLD: 38.6 MM HG (ref 35–45)
PEEP RESPIRATORY: 6 CM[H2O]
PH BLDA: 7.47 PH UNITS (ref 7.35–7.45)
PH, TEMP CORRECTED: 7.47 PH UNITS (ref 7.35–7.45)
PO2 BLDA: 73.4 MM HG (ref 83–108)
PO2 TEMP ADJ BLD: 73.4 MM HG (ref 83–108)
SAO2 % BLDCOA: 95.3 % (ref 94–99)
SET MECH RESP RATE: 22
VENTILATOR MODE: ABNORMAL

## 2022-01-20 PROCEDURE — 71045 X-RAY EXAM CHEST 1 VIEW: CPT

## 2022-01-20 PROCEDURE — 25010000002 METOCLOPRAMIDE PER 10 MG: Performed by: INTERNAL MEDICINE

## 2022-01-20 PROCEDURE — 82803 BLOOD GASES ANY COMBINATION: CPT

## 2022-01-20 PROCEDURE — 94799 UNLISTED PULMONARY SVC/PX: CPT

## 2022-01-20 PROCEDURE — 94003 VENT MGMT INPAT SUBQ DAY: CPT

## 2022-01-20 PROCEDURE — 25010000002 ENOXAPARIN PER 10 MG: Performed by: INTERNAL MEDICINE

## 2022-01-20 PROCEDURE — 99233 SBSQ HOSP IP/OBS HIGH 50: CPT | Performed by: INTERNAL MEDICINE

## 2022-01-20 PROCEDURE — 25010000002 PROPOFOL 10 MG/ML EMULSION: Performed by: INTERNAL MEDICINE

## 2022-01-20 PROCEDURE — 82962 GLUCOSE BLOOD TEST: CPT

## 2022-01-20 PROCEDURE — 63710000001 DEXAMETHASONE PER 0.25 MG: Performed by: INTERNAL MEDICINE

## 2022-01-20 RX ADMIN — METOCLOPRAMIDE HYDROCHLORIDE 10 MG: 5 INJECTION INTRAMUSCULAR; INTRAVENOUS at 20:05

## 2022-01-20 RX ADMIN — DEXAMETHASONE 6 MG: 4 TABLET ORAL at 08:46

## 2022-01-20 RX ADMIN — DEXMEDETOMIDINE HYDROCHLORIDE 1.1 MCG/KG/HR: 4 INJECTION, SOLUTION INTRAVENOUS at 02:15

## 2022-01-20 RX ADMIN — Medication 1000 UNITS: at 08:46

## 2022-01-20 RX ADMIN — PROPOFOL 60 MCG/KG/MIN: 10 INJECTION, EMULSION INTRAVENOUS at 00:06

## 2022-01-20 RX ADMIN — ALBUTEROL SULFATE 6 PUFF: 90 AEROSOL, METERED RESPIRATORY (INHALATION) at 07:08

## 2022-01-20 RX ADMIN — PANTOPRAZOLE SODIUM 40 MG: 40 INJECTION, POWDER, FOR SOLUTION INTRAVENOUS at 20:05

## 2022-01-20 RX ADMIN — OXYCODONE HYDROCHLORIDE 5 MG: 5 SOLUTION ORAL at 08:47

## 2022-01-20 RX ADMIN — DEXMEDETOMIDINE HYDROCHLORIDE 1.2 MCG/KG/HR: 4 INJECTION, SOLUTION INTRAVENOUS at 21:08

## 2022-01-20 RX ADMIN — GUAIFENESIN 400 MG: 200 SOLUTION ORAL at 08:47

## 2022-01-20 RX ADMIN — PROPOFOL 60 MCG/KG/MIN: 10 INJECTION, EMULSION INTRAVENOUS at 05:15

## 2022-01-20 RX ADMIN — PROPOFOL 55 MCG/KG/MIN: 10 INJECTION, EMULSION INTRAVENOUS at 21:08

## 2022-01-20 RX ADMIN — LORAZEPAM 1 MG: 2 SOLUTION, CONCENTRATE ORAL at 21:08

## 2022-01-20 RX ADMIN — LORAZEPAM 1 MG: 2 SOLUTION, CONCENTRATE ORAL at 14:29

## 2022-01-20 RX ADMIN — BUDESONIDE AND FORMOTEROL FUMARATE DIHYDRATE 2 PUFF: 160; 4.5 AEROSOL RESPIRATORY (INHALATION) at 19:19

## 2022-01-20 RX ADMIN — PROPOFOL 50 MCG/KG/MIN: 10 INJECTION, EMULSION INTRAVENOUS at 18:06

## 2022-01-20 RX ADMIN — LORAZEPAM 1 MG: 2 SOLUTION, CONCENTRATE ORAL at 05:14

## 2022-01-20 RX ADMIN — DEXMEDETOMIDINE HYDROCHLORIDE 1.1 MCG/KG/HR: 4 INJECTION, SOLUTION INTRAVENOUS at 05:14

## 2022-01-20 RX ADMIN — PROPOFOL 55 MCG/KG/MIN: 10 INJECTION, EMULSION INTRAVENOUS at 11:30

## 2022-01-20 RX ADMIN — THIAMINE HCL TAB 100 MG 100 MG: 100 TAB at 08:47

## 2022-01-20 RX ADMIN — ENOXAPARIN SODIUM 100 MG: 100 INJECTION SUBCUTANEOUS at 18:37

## 2022-01-20 RX ADMIN — PROPOFOL 60 MCG/KG/MIN: 10 INJECTION, EMULSION INTRAVENOUS at 02:15

## 2022-01-20 RX ADMIN — METOCLOPRAMIDE HYDROCHLORIDE 10 MG: 5 INJECTION INTRAMUSCULAR; INTRAVENOUS at 08:17

## 2022-01-20 RX ADMIN — SODIUM CHLORIDE, PRESERVATIVE FREE 10 ML: 5 INJECTION INTRAVENOUS at 20:04

## 2022-01-20 RX ADMIN — ATORVASTATIN CALCIUM 20 MG: 10 TABLET, FILM COATED ORAL at 20:04

## 2022-01-20 RX ADMIN — GUAIFENESIN 400 MG: 200 SOLUTION ORAL at 16:55

## 2022-01-20 RX ADMIN — PANTOPRAZOLE SODIUM 40 MG: 40 INJECTION, POWDER, FOR SOLUTION INTRAVENOUS at 08:47

## 2022-01-20 RX ADMIN — GUAIFENESIN 400 MG: 200 SOLUTION ORAL at 00:06

## 2022-01-20 RX ADMIN — DOCUSATE SODIUM 50 MG AND SENNOSIDES 8.6 MG 1 TABLET: 8.6; 5 TABLET, FILM COATED ORAL at 08:46

## 2022-01-20 RX ADMIN — DEXMEDETOMIDINE HYDROCHLORIDE 1.2 MCG/KG/HR: 4 INJECTION, SOLUTION INTRAVENOUS at 18:06

## 2022-01-20 RX ADMIN — METOCLOPRAMIDE HYDROCHLORIDE 10 MG: 5 INJECTION INTRAMUSCULAR; INTRAVENOUS at 11:30

## 2022-01-20 RX ADMIN — ALBUTEROL SULFATE 6 PUFF: 90 AEROSOL, METERED RESPIRATORY (INHALATION) at 19:19

## 2022-01-20 RX ADMIN — POLYETHYLENE GLYCOL 3350 17 G: 17 POWDER, FOR SOLUTION ORAL at 08:46

## 2022-01-20 RX ADMIN — PROPOFOL 55 MCG/KG/MIN: 10 INJECTION, EMULSION INTRAVENOUS at 08:18

## 2022-01-20 RX ADMIN — METOCLOPRAMIDE HYDROCHLORIDE 10 MG: 5 INJECTION INTRAMUSCULAR; INTRAVENOUS at 18:37

## 2022-01-20 RX ADMIN — OXYCODONE HYDROCHLORIDE 5 MG: 5 SOLUTION ORAL at 20:06

## 2022-01-20 RX ADMIN — ZINC SULFATE 220 MG (50 MG) CAPSULE 220 MG: CAPSULE at 08:46

## 2022-01-20 RX ADMIN — PROPOFOL 50 MCG/KG/MIN: 10 INJECTION, EMULSION INTRAVENOUS at 14:29

## 2022-01-20 RX ADMIN — BUDESONIDE AND FORMOTEROL FUMARATE DIHYDRATE 2 PUFF: 160; 4.5 AEROSOL RESPIRATORY (INHALATION) at 07:08

## 2022-01-20 RX ADMIN — ENOXAPARIN SODIUM 100 MG: 100 INJECTION SUBCUTANEOUS at 05:15

## 2022-01-20 RX ADMIN — ALBUTEROL SULFATE 6 PUFF: 90 AEROSOL, METERED RESPIRATORY (INHALATION) at 15:06

## 2022-01-20 RX ADMIN — ALBUTEROL SULFATE 6 PUFF: 90 AEROSOL, METERED RESPIRATORY (INHALATION) at 11:24

## 2022-01-20 RX ADMIN — OXYCODONE HYDROCHLORIDE AND ACETAMINOPHEN 500 MG: 500 TABLET ORAL at 08:46

## 2022-01-20 RX ADMIN — DEXMEDETOMIDINE HYDROCHLORIDE 1.2 MCG/KG/HR: 4 INJECTION, SOLUTION INTRAVENOUS at 14:29

## 2022-01-20 RX ADMIN — DEXMEDETOMIDINE HYDROCHLORIDE 1.3 MCG/KG/HR: 4 INJECTION, SOLUTION INTRAVENOUS at 11:30

## 2022-01-20 RX ADMIN — DOCUSATE SODIUM 50 MG AND SENNOSIDES 8.6 MG 1 TABLET: 8.6; 5 TABLET, FILM COATED ORAL at 20:04

## 2022-01-20 RX ADMIN — PROPOFOL 60 MCG/KG/MIN: 10 INJECTION, EMULSION INTRAVENOUS at 23:56

## 2022-01-20 RX ADMIN — DEXMEDETOMIDINE HYDROCHLORIDE 1.2 MCG/KG/HR: 4 INJECTION, SOLUTION INTRAVENOUS at 08:17

## 2022-01-20 RX ADMIN — OXYCODONE HYDROCHLORIDE 5 MG: 5 SOLUTION ORAL at 16:55

## 2022-01-21 LAB
ALBUMIN SERPL-MCNC: 2.8 G/DL (ref 3.5–5.2)
ALBUMIN/GLOB SERPL: 0.5 G/DL
ALP SERPL-CCNC: 93 U/L (ref 39–117)
ALT SERPL W P-5'-P-CCNC: 38 U/L (ref 1–41)
ANION GAP SERPL CALCULATED.3IONS-SCNC: 11 MMOL/L (ref 5–15)
ARTERIAL PATENCY WRIST A: ABNORMAL
AST SERPL-CCNC: 36 U/L (ref 1–40)
ATMOSPHERIC PRESS: 762 MMHG
BACTERIA SPEC RESP CULT: ABNORMAL
BASE EXCESS BLDA CALC-SCNC: 1.9 MMOL/L (ref 0–2)
BASOPHILS # BLD AUTO: 0.04 10*3/MM3 (ref 0–0.2)
BASOPHILS NFR BLD AUTO: 0.4 % (ref 0–1.5)
BDY SITE: ABNORMAL
BILIRUB SERPL-MCNC: 1.3 MG/DL (ref 0–1.2)
BODY TEMPERATURE: 37 C
BUN SERPL-MCNC: 28 MG/DL (ref 6–20)
BUN/CREAT SERPL: 57.1 (ref 7–25)
CALCIUM SPEC-SCNC: 8.7 MG/DL (ref 8.6–10.5)
CHLORIDE SERPL-SCNC: 101 MMOL/L (ref 98–107)
CO2 SERPL-SCNC: 23 MMOL/L (ref 22–29)
CREAT SERPL-MCNC: 0.49 MG/DL (ref 0.76–1.27)
CRP SERPL-MCNC: 12.75 MG/DL (ref 0–0.5)
DEPRECATED RDW RBC AUTO: 46.7 FL (ref 37–54)
EOSINOPHIL # BLD AUTO: 0.79 10*3/MM3 (ref 0–0.4)
EOSINOPHIL NFR BLD AUTO: 8.5 % (ref 0.3–6.2)
ERYTHROCYTE [DISTWIDTH] IN BLOOD BY AUTOMATED COUNT: 17.1 % (ref 12.3–15.4)
GFR SERPL CREATININE-BSD FRML MDRD: >150 ML/MIN/1.73
GLOBULIN UR ELPH-MCNC: 5.3 GM/DL
GLUCOSE BLDC GLUCOMTR-MCNC: 102 MG/DL (ref 70–130)
GLUCOSE BLDC GLUCOMTR-MCNC: 107 MG/DL (ref 70–130)
GLUCOSE BLDC GLUCOMTR-MCNC: 90 MG/DL (ref 70–130)
GLUCOSE BLDC GLUCOMTR-MCNC: 94 MG/DL (ref 70–130)
GLUCOSE BLDC GLUCOMTR-MCNC: 97 MG/DL (ref 70–130)
GLUCOSE SERPL-MCNC: 102 MG/DL (ref 65–99)
GRAM STN SPEC: ABNORMAL
HCO3 BLDA-SCNC: 25.6 MMOL/L (ref 20–26)
HCT VFR BLD AUTO: 36.4 % (ref 37.5–51)
HGB BLD-MCNC: 11.5 G/DL (ref 13–17.7)
IMM GRANULOCYTES # BLD AUTO: 0.12 10*3/MM3 (ref 0–0.05)
IMM GRANULOCYTES NFR BLD AUTO: 1.3 % (ref 0–0.5)
INHALED O2 CONCENTRATION: 35 %
LYMPHOCYTES # BLD AUTO: 2.52 10*3/MM3 (ref 0.7–3.1)
LYMPHOCYTES NFR BLD AUTO: 27.2 % (ref 19.6–45.3)
Lab: ABNORMAL
MCH RBC QN AUTO: 25.5 PG (ref 26.6–33)
MCHC RBC AUTO-ENTMCNC: 31.6 G/DL (ref 31.5–35.7)
MCV RBC AUTO: 80.7 FL (ref 79–97)
MODALITY: ABNORMAL
MONOCYTES # BLD AUTO: 1.09 10*3/MM3 (ref 0.1–0.9)
MONOCYTES NFR BLD AUTO: 11.8 % (ref 5–12)
NEUTROPHILS NFR BLD AUTO: 4.7 10*3/MM3 (ref 1.7–7)
NEUTROPHILS NFR BLD AUTO: 50.8 % (ref 42.7–76)
NRBC BLD AUTO-RTO: 0 /100 WBC (ref 0–0.2)
PAW @ PEAK INSP FLOW SETTING VENT: 17 CMH2O
PCO2 BLDA: 35.7 MM HG (ref 35–45)
PCO2 TEMP ADJ BLD: 35.7 MM HG (ref 35–45)
PEEP RESPIRATORY: 5 CM[H2O]
PH BLDA: 7.46 PH UNITS (ref 7.35–7.45)
PH, TEMP CORRECTED: 7.46 PH UNITS (ref 7.35–7.45)
PLATELET # BLD AUTO: 462 10*3/MM3 (ref 140–450)
PMV BLD AUTO: 11 FL (ref 6–12)
PO2 BLDA: 86.5 MM HG (ref 83–108)
PO2 TEMP ADJ BLD: 86.5 MM HG (ref 83–108)
POTASSIUM SERPL-SCNC: 3.8 MMOL/L (ref 3.5–5.2)
PROCALCITONIN SERPL-MCNC: 0.13 NG/ML (ref 0–0.25)
PROT SERPL-MCNC: 8.1 G/DL (ref 6–8.5)
RBC # BLD AUTO: 4.51 10*6/MM3 (ref 4.14–5.8)
SAO2 % BLDCOA: 97.2 % (ref 94–99)
SET MECH RESP RATE: 18
SODIUM SERPL-SCNC: 135 MMOL/L (ref 136–145)
VENTILATOR MODE: ABNORMAL
WBC NRBC COR # BLD: 9.26 10*3/MM3 (ref 3.4–10.8)

## 2022-01-21 PROCEDURE — 25010000002 METOCLOPRAMIDE PER 10 MG: Performed by: INTERNAL MEDICINE

## 2022-01-21 PROCEDURE — 25010000002 PROPOFOL 10 MG/ML EMULSION: Performed by: INTERNAL MEDICINE

## 2022-01-21 PROCEDURE — 94799 UNLISTED PULMONARY SVC/PX: CPT

## 2022-01-21 PROCEDURE — 86140 C-REACTIVE PROTEIN: CPT | Performed by: INTERNAL MEDICINE

## 2022-01-21 PROCEDURE — 82962 GLUCOSE BLOOD TEST: CPT

## 2022-01-21 PROCEDURE — 85025 COMPLETE CBC W/AUTO DIFF WBC: CPT | Performed by: INTERNAL MEDICINE

## 2022-01-21 PROCEDURE — 25010000002 ENOXAPARIN PER 10 MG: Performed by: INTERNAL MEDICINE

## 2022-01-21 PROCEDURE — 25010000002 MORPHINE PER 10 MG: Performed by: INTERNAL MEDICINE

## 2022-01-21 PROCEDURE — 80053 COMPREHEN METABOLIC PANEL: CPT | Performed by: INTERNAL MEDICINE

## 2022-01-21 PROCEDURE — 99233 SBSQ HOSP IP/OBS HIGH 50: CPT | Performed by: INTERNAL MEDICINE

## 2022-01-21 PROCEDURE — 94003 VENT MGMT INPAT SUBQ DAY: CPT

## 2022-01-21 PROCEDURE — 82803 BLOOD GASES ANY COMBINATION: CPT

## 2022-01-21 PROCEDURE — 84145 PROCALCITONIN (PCT): CPT | Performed by: INTERNAL MEDICINE

## 2022-01-21 PROCEDURE — 63710000001 DEXAMETHASONE PER 0.25 MG: Performed by: INTERNAL MEDICINE

## 2022-01-21 RX ADMIN — METOCLOPRAMIDE HYDROCHLORIDE 10 MG: 5 INJECTION INTRAMUSCULAR; INTRAVENOUS at 16:28

## 2022-01-21 RX ADMIN — PANTOPRAZOLE SODIUM 40 MG: 40 INJECTION, POWDER, FOR SOLUTION INTRAVENOUS at 08:17

## 2022-01-21 RX ADMIN — PROPOFOL 30 MCG/KG/MIN: 10 INJECTION, EMULSION INTRAVENOUS at 11:11

## 2022-01-21 RX ADMIN — DEXMEDETOMIDINE HYDROCHLORIDE 1.5 MCG/KG/HR: 4 INJECTION, SOLUTION INTRAVENOUS at 13:13

## 2022-01-21 RX ADMIN — METOCLOPRAMIDE HYDROCHLORIDE 10 MG: 5 INJECTION INTRAMUSCULAR; INTRAVENOUS at 08:04

## 2022-01-21 RX ADMIN — POLYETHYLENE GLYCOL 3350 17 G: 17 POWDER, FOR SOLUTION ORAL at 08:17

## 2022-01-21 RX ADMIN — PROPOFOL 50 MCG/KG/MIN: 10 INJECTION, EMULSION INTRAVENOUS at 18:42

## 2022-01-21 RX ADMIN — OXYCODONE HYDROCHLORIDE 5 MG: 5 SOLUTION ORAL at 08:17

## 2022-01-21 RX ADMIN — ATORVASTATIN CALCIUM 20 MG: 10 TABLET, FILM COATED ORAL at 20:21

## 2022-01-21 RX ADMIN — THIAMINE HCL TAB 100 MG 100 MG: 100 TAB at 08:18

## 2022-01-21 RX ADMIN — GUAIFENESIN 400 MG: 200 SOLUTION ORAL at 08:17

## 2022-01-21 RX ADMIN — DEXMEDETOMIDINE HYDROCHLORIDE 1.5 MCG/KG/HR: 4 INJECTION, SOLUTION INTRAVENOUS at 14:55

## 2022-01-21 RX ADMIN — DOCUSATE SODIUM 50 MG AND SENNOSIDES 8.6 MG 1 TABLET: 8.6; 5 TABLET, FILM COATED ORAL at 20:21

## 2022-01-21 RX ADMIN — DEXMEDETOMIDINE HYDROCHLORIDE 1.2 MCG/KG/HR: 4 INJECTION, SOLUTION INTRAVENOUS at 06:19

## 2022-01-21 RX ADMIN — OXYCODONE HYDROCHLORIDE AND ACETAMINOPHEN 500 MG: 500 TABLET ORAL at 08:18

## 2022-01-21 RX ADMIN — GUAIFENESIN 400 MG: 200 SOLUTION ORAL at 00:01

## 2022-01-21 RX ADMIN — DEXMEDETOMIDINE HYDROCHLORIDE 1.3 MCG/KG/HR: 4 INJECTION, SOLUTION INTRAVENOUS at 10:47

## 2022-01-21 RX ADMIN — ALBUTEROL SULFATE 6 PUFF: 90 AEROSOL, METERED RESPIRATORY (INHALATION) at 07:38

## 2022-01-21 RX ADMIN — BUDESONIDE AND FORMOTEROL FUMARATE DIHYDRATE 2 PUFF: 160; 4.5 AEROSOL RESPIRATORY (INHALATION) at 07:43

## 2022-01-21 RX ADMIN — DEXMEDETOMIDINE HYDROCHLORIDE 1.5 MCG/KG/HR: 4 INJECTION, SOLUTION INTRAVENOUS at 20:21

## 2022-01-21 RX ADMIN — LORAZEPAM 1 MG: 2 SOLUTION, CONCENTRATE ORAL at 05:55

## 2022-01-21 RX ADMIN — LORAZEPAM 1 MG: 2 SOLUTION, CONCENTRATE ORAL at 23:16

## 2022-01-21 RX ADMIN — DEXAMETHASONE 6 MG: 4 TABLET ORAL at 08:18

## 2022-01-21 RX ADMIN — ENOXAPARIN SODIUM 100 MG: 100 INJECTION SUBCUTANEOUS at 05:55

## 2022-01-21 RX ADMIN — PROPOFOL 40 MCG/KG/MIN: 10 INJECTION, EMULSION INTRAVENOUS at 16:09

## 2022-01-21 RX ADMIN — DOCUSATE SODIUM 50 MG AND SENNOSIDES 8.6 MG 1 TABLET: 8.6; 5 TABLET, FILM COATED ORAL at 08:18

## 2022-01-21 RX ADMIN — DEXMEDETOMIDINE HYDROCHLORIDE 1.5 MCG/KG/HR: 4 INJECTION, SOLUTION INTRAVENOUS at 23:17

## 2022-01-21 RX ADMIN — PROPOFOL 40 MCG/KG/MIN: 10 INJECTION, EMULSION INTRAVENOUS at 06:51

## 2022-01-21 RX ADMIN — DEXMEDETOMIDINE HYDROCHLORIDE 1.5 MCG/KG/HR: 4 INJECTION, SOLUTION INTRAVENOUS at 18:04

## 2022-01-21 RX ADMIN — GUAIFENESIN 400 MG: 200 SOLUTION ORAL at 16:08

## 2022-01-21 RX ADMIN — ALBUTEROL SULFATE 6 PUFF: 90 AEROSOL, METERED RESPIRATORY (INHALATION) at 18:29

## 2022-01-21 RX ADMIN — BUDESONIDE AND FORMOTEROL FUMARATE DIHYDRATE 2 PUFF: 160; 4.5 AEROSOL RESPIRATORY (INHALATION) at 18:30

## 2022-01-21 RX ADMIN — ALBUTEROL SULFATE 6 PUFF: 90 AEROSOL, METERED RESPIRATORY (INHALATION) at 14:05

## 2022-01-21 RX ADMIN — Medication 1000 UNITS: at 08:18

## 2022-01-21 RX ADMIN — PANTOPRAZOLE SODIUM 40 MG: 40 INJECTION, POWDER, FOR SOLUTION INTRAVENOUS at 20:21

## 2022-01-21 RX ADMIN — ZINC SULFATE 220 MG (50 MG) CAPSULE 220 MG: CAPSULE at 08:18

## 2022-01-21 RX ADMIN — PROPOFOL 60 MCG/KG/MIN: 10 INJECTION, EMULSION INTRAVENOUS at 23:17

## 2022-01-21 RX ADMIN — METOCLOPRAMIDE HYDROCHLORIDE 10 MG: 5 INJECTION INTRAMUSCULAR; INTRAVENOUS at 20:21

## 2022-01-21 RX ADMIN — ALBUTEROL SULFATE 6 PUFF: 90 AEROSOL, METERED RESPIRATORY (INHALATION) at 11:04

## 2022-01-21 RX ADMIN — SODIUM CHLORIDE, PRESERVATIVE FREE 10 ML: 5 INJECTION INTRAVENOUS at 20:22

## 2022-01-21 RX ADMIN — PROPOFOL 55 MCG/KG/MIN: 10 INJECTION, EMULSION INTRAVENOUS at 20:22

## 2022-01-21 RX ADMIN — LORAZEPAM 1 MG: 2 SOLUTION, CONCENTRATE ORAL at 14:54

## 2022-01-21 RX ADMIN — MORPHINE SULFATE 3 MG: 4 INJECTION INTRAVENOUS at 11:11

## 2022-01-21 RX ADMIN — ENOXAPARIN SODIUM 100 MG: 100 INJECTION SUBCUTANEOUS at 18:00

## 2022-01-21 RX ADMIN — OXYCODONE HYDROCHLORIDE 5 MG: 5 SOLUTION ORAL at 20:22

## 2022-01-21 RX ADMIN — PROPOFOL 60 MCG/KG/MIN: 10 INJECTION, EMULSION INTRAVENOUS at 01:52

## 2022-01-21 RX ADMIN — DEXMEDETOMIDINE HYDROCHLORIDE 1.2 MCG/KG/HR: 4 INJECTION, SOLUTION INTRAVENOUS at 00:20

## 2022-01-21 RX ADMIN — OXYCODONE HYDROCHLORIDE 5 MG: 5 SOLUTION ORAL at 16:09

## 2022-01-21 RX ADMIN — DEXMEDETOMIDINE HYDROCHLORIDE 1.2 MCG/KG/HR: 4 INJECTION, SOLUTION INTRAVENOUS at 03:20

## 2022-01-21 RX ADMIN — PROPOFOL 60 MCG/KG/MIN: 10 INJECTION, EMULSION INTRAVENOUS at 04:20

## 2022-01-21 RX ADMIN — METOCLOPRAMIDE HYDROCHLORIDE 10 MG: 5 INJECTION INTRAMUSCULAR; INTRAVENOUS at 11:14

## 2022-01-22 LAB
BACTERIA SPEC AEROBE CULT: NORMAL
BACTERIA SPEC AEROBE CULT: NORMAL
GLUCOSE BLDC GLUCOMTR-MCNC: 85 MG/DL (ref 70–130)

## 2022-01-22 PROCEDURE — 25010000002 METOCLOPRAMIDE PER 10 MG: Performed by: INTERNAL MEDICINE

## 2022-01-22 PROCEDURE — 25010000002 ENOXAPARIN PER 10 MG: Performed by: INTERNAL MEDICINE

## 2022-01-22 PROCEDURE — 94799 UNLISTED PULMONARY SVC/PX: CPT

## 2022-01-22 PROCEDURE — 25010000002 PROPOFOL 10 MG/ML EMULSION: Performed by: INTERNAL MEDICINE

## 2022-01-22 PROCEDURE — 99232 SBSQ HOSP IP/OBS MODERATE 35: CPT | Performed by: INTERNAL MEDICINE

## 2022-01-22 PROCEDURE — 94003 VENT MGMT INPAT SUBQ DAY: CPT

## 2022-01-22 PROCEDURE — 25010000002 CEFTRIAXONE PER 250 MG: Performed by: INTERNAL MEDICINE

## 2022-01-22 PROCEDURE — 82962 GLUCOSE BLOOD TEST: CPT

## 2022-01-22 PROCEDURE — 25010000002 LORAZEPAM PER 2 MG: Performed by: INTERNAL MEDICINE

## 2022-01-22 RX ORDER — MAGNESIUM CARB/ALUMINUM HYDROX 105-160MG
150 TABLET,CHEWABLE ORAL ONCE
Status: COMPLETED | OUTPATIENT
Start: 2022-01-22 | End: 2022-01-22

## 2022-01-22 RX ORDER — LORAZEPAM 2 MG/ML
1 INJECTION INTRAMUSCULAR EVERY 8 HOURS SCHEDULED
Status: COMPLETED | OUTPATIENT
Start: 2022-01-22 | End: 2022-01-29

## 2022-01-22 RX ADMIN — PROPOFOL 60 MCG/KG/MIN: 10 INJECTION, EMULSION INTRAVENOUS at 16:43

## 2022-01-22 RX ADMIN — METOCLOPRAMIDE HYDROCHLORIDE 10 MG: 5 INJECTION INTRAMUSCULAR; INTRAVENOUS at 08:22

## 2022-01-22 RX ADMIN — OXYCODONE HYDROCHLORIDE AND ACETAMINOPHEN 500 MG: 500 TABLET ORAL at 13:25

## 2022-01-22 RX ADMIN — PROPOFOL 60 MCG/KG/MIN: 10 INJECTION, EMULSION INTRAVENOUS at 11:16

## 2022-01-22 RX ADMIN — ALBUTEROL SULFATE 6 PUFF: 90 AEROSOL, METERED RESPIRATORY (INHALATION) at 15:06

## 2022-01-22 RX ADMIN — POLYETHYLENE GLYCOL 3350 17 G: 17 POWDER, FOR SOLUTION ORAL at 08:23

## 2022-01-22 RX ADMIN — PROPOFOL 60 MCG/KG/MIN: 10 INJECTION, EMULSION INTRAVENOUS at 08:20

## 2022-01-22 RX ADMIN — LORAZEPAM 1 MG: 2 INJECTION INTRAMUSCULAR; INTRAVENOUS at 23:48

## 2022-01-22 RX ADMIN — THIAMINE HCL TAB 100 MG 100 MG: 100 TAB at 08:22

## 2022-01-22 RX ADMIN — LORAZEPAM 1 MG: 2 SOLUTION, CONCENTRATE ORAL at 14:05

## 2022-01-22 RX ADMIN — METOCLOPRAMIDE HYDROCHLORIDE 10 MG: 5 INJECTION INTRAMUSCULAR; INTRAVENOUS at 11:19

## 2022-01-22 RX ADMIN — BUDESONIDE AND FORMOTEROL FUMARATE DIHYDRATE 2 PUFF: 160; 4.5 AEROSOL RESPIRATORY (INHALATION) at 18:32

## 2022-01-22 RX ADMIN — DEXMEDETOMIDINE HYDROCHLORIDE 1.5 MCG/KG/HR: 4 INJECTION, SOLUTION INTRAVENOUS at 05:48

## 2022-01-22 RX ADMIN — OXYCODONE HYDROCHLORIDE 5 MG: 5 SOLUTION ORAL at 08:24

## 2022-01-22 RX ADMIN — ALBUTEROL SULFATE 6 PUFF: 90 AEROSOL, METERED RESPIRATORY (INHALATION) at 07:23

## 2022-01-22 RX ADMIN — PROPOFOL 60 MCG/KG/MIN: 10 INJECTION, EMULSION INTRAVENOUS at 13:24

## 2022-01-22 RX ADMIN — DEXMEDETOMIDINE HYDROCHLORIDE 1.5 MCG/KG/HR: 4 INJECTION, SOLUTION INTRAVENOUS at 16:42

## 2022-01-22 RX ADMIN — DEXMEDETOMIDINE HYDROCHLORIDE 1.5 MCG/KG/HR: 4 INJECTION, SOLUTION INTRAVENOUS at 08:20

## 2022-01-22 RX ADMIN — ATORVASTATIN CALCIUM 20 MG: 10 TABLET, FILM COATED ORAL at 20:02

## 2022-01-22 RX ADMIN — BUDESONIDE AND FORMOTEROL FUMARATE DIHYDRATE 2 PUFF: 160; 4.5 AEROSOL RESPIRATORY (INHALATION) at 07:20

## 2022-01-22 RX ADMIN — PANTOPRAZOLE SODIUM 40 MG: 40 INJECTION, POWDER, FOR SOLUTION INTRAVENOUS at 20:02

## 2022-01-22 RX ADMIN — GUAIFENESIN 400 MG: 200 SOLUTION ORAL at 17:46

## 2022-01-22 RX ADMIN — ENOXAPARIN SODIUM 100 MG: 100 INJECTION SUBCUTANEOUS at 05:48

## 2022-01-22 RX ADMIN — ALBUTEROL SULFATE 6 PUFF: 90 AEROSOL, METERED RESPIRATORY (INHALATION) at 18:31

## 2022-01-22 RX ADMIN — DEXMEDETOMIDINE HYDROCHLORIDE 1.5 MCG/KG/HR: 4 INJECTION, SOLUTION INTRAVENOUS at 23:47

## 2022-01-22 RX ADMIN — DOCUSATE SODIUM 50 MG AND SENNOSIDES 8.6 MG 1 TABLET: 8.6; 5 TABLET, FILM COATED ORAL at 20:01

## 2022-01-22 RX ADMIN — ALBUTEROL SULFATE 6 PUFF: 90 AEROSOL, METERED RESPIRATORY (INHALATION) at 11:07

## 2022-01-22 RX ADMIN — PROPOFOL 60 MCG/KG/MIN: 10 INJECTION, EMULSION INTRAVENOUS at 01:27

## 2022-01-22 RX ADMIN — Medication 150 ML: at 23:48

## 2022-01-22 RX ADMIN — Medication 1000 UNITS: at 13:26

## 2022-01-22 RX ADMIN — DEXMEDETOMIDINE HYDROCHLORIDE 1.5 MCG/KG/HR: 4 INJECTION, SOLUTION INTRAVENOUS at 16:43

## 2022-01-22 RX ADMIN — OXYCODONE HYDROCHLORIDE 5 MG: 5 SOLUTION ORAL at 17:48

## 2022-01-22 RX ADMIN — PROPOFOL 65 MCG/KG/MIN: 10 INJECTION, EMULSION INTRAVENOUS at 21:52

## 2022-01-22 RX ADMIN — METOCLOPRAMIDE HYDROCHLORIDE 10 MG: 5 INJECTION INTRAMUSCULAR; INTRAVENOUS at 20:02

## 2022-01-22 RX ADMIN — ENOXAPARIN SODIUM 90 MG: 100 INJECTION SUBCUTANEOUS at 17:44

## 2022-01-22 RX ADMIN — GUAIFENESIN 400 MG: 200 SOLUTION ORAL at 01:27

## 2022-01-22 RX ADMIN — SODIUM CHLORIDE 1 G: 900 INJECTION INTRAVENOUS at 08:27

## 2022-01-22 RX ADMIN — DEXMEDETOMIDINE HYDROCHLORIDE 1.5 MCG/KG/HR: 4 INJECTION, SOLUTION INTRAVENOUS at 11:17

## 2022-01-22 RX ADMIN — DEXMEDETOMIDINE HYDROCHLORIDE 1.5 MCG/KG/HR: 4 INJECTION, SOLUTION INTRAVENOUS at 21:52

## 2022-01-22 RX ADMIN — GUAIFENESIN 400 MG: 200 SOLUTION ORAL at 08:23

## 2022-01-22 RX ADMIN — DEXMEDETOMIDINE HYDROCHLORIDE 1.5 MCG/KG/HR: 4 INJECTION, SOLUTION INTRAVENOUS at 14:05

## 2022-01-22 RX ADMIN — DOCUSATE SODIUM 50 MG AND SENNOSIDES 8.6 MG 1 TABLET: 8.6; 5 TABLET, FILM COATED ORAL at 08:23

## 2022-01-22 RX ADMIN — METOCLOPRAMIDE HYDROCHLORIDE 10 MG: 5 INJECTION INTRAMUSCULAR; INTRAVENOUS at 17:46

## 2022-01-22 RX ADMIN — PROPOFOL 60 MCG/KG/MIN: 10 INJECTION, EMULSION INTRAVENOUS at 03:46

## 2022-01-22 RX ADMIN — DEXMEDETOMIDINE HYDROCHLORIDE 1.5 MCG/KG/HR: 4 INJECTION, SOLUTION INTRAVENOUS at 03:48

## 2022-01-22 RX ADMIN — PROPOFOL 60 MCG/KG/MIN: 10 INJECTION, EMULSION INTRAVENOUS at 05:48

## 2022-01-22 RX ADMIN — DEXMEDETOMIDINE HYDROCHLORIDE 1.5 MCG/KG/HR: 4 INJECTION, SOLUTION INTRAVENOUS at 20:00

## 2022-01-22 RX ADMIN — PROPOFOL 65 MCG/KG/MIN: 10 INJECTION, EMULSION INTRAVENOUS at 23:47

## 2022-01-22 RX ADMIN — LORAZEPAM 1 MG: 2 SOLUTION, CONCENTRATE ORAL at 05:48

## 2022-01-22 RX ADMIN — ZINC SULFATE 220 MG (50 MG) CAPSULE 220 MG: CAPSULE at 08:23

## 2022-01-22 RX ADMIN — ACETAMINOPHEN 650 MG: 160 SOLUTION ORAL at 08:47

## 2022-01-22 RX ADMIN — PROPOFOL 60 MCG/KG/MIN: 10 INJECTION, EMULSION INTRAVENOUS at 20:01

## 2022-01-22 RX ADMIN — OXYCODONE HYDROCHLORIDE 5 MG: 5 SOLUTION ORAL at 20:01

## 2022-01-22 RX ADMIN — DEXMEDETOMIDINE HYDROCHLORIDE 1.5 MCG/KG/HR: 4 INJECTION, SOLUTION INTRAVENOUS at 01:27

## 2022-01-22 RX ADMIN — PANTOPRAZOLE SODIUM 40 MG: 40 INJECTION, POWDER, FOR SOLUTION INTRAVENOUS at 08:23

## 2022-01-23 ENCOUNTER — APPOINTMENT (OUTPATIENT)
Dept: GENERAL RADIOLOGY | Facility: HOSPITAL | Age: 52
End: 2022-01-23

## 2022-01-23 PROBLEM — Z99.11 VENTILATOR DEPENDENT: Status: ACTIVE | Noted: 2022-01-23

## 2022-01-23 LAB
ALBUMIN SERPL-MCNC: 2.8 G/DL (ref 3.5–5.2)
ALBUMIN/GLOB SERPL: 0.5 G/DL
ALP SERPL-CCNC: 112 U/L (ref 39–117)
ALT SERPL W P-5'-P-CCNC: 47 U/L (ref 1–41)
ANION GAP SERPL CALCULATED.3IONS-SCNC: 11 MMOL/L (ref 5–15)
AST SERPL-CCNC: 47 U/L (ref 1–40)
BACTERIA UR QL AUTO: ABNORMAL /HPF
BASOPHILS # BLD AUTO: 0.08 10*3/MM3 (ref 0–0.2)
BASOPHILS NFR BLD AUTO: 0.8 % (ref 0–1.5)
BILIRUB SERPL-MCNC: 1.4 MG/DL (ref 0–1.2)
BILIRUB UR QL STRIP: ABNORMAL
BUN SERPL-MCNC: 24 MG/DL (ref 6–20)
BUN/CREAT SERPL: 34.3 (ref 7–25)
CALCIUM SPEC-SCNC: 8.8 MG/DL (ref 8.6–10.5)
CHLORIDE SERPL-SCNC: 101 MMOL/L (ref 98–107)
CK SERPL-CCNC: 74 U/L (ref 20–200)
CLARITY UR: CLEAR
CO2 SERPL-SCNC: 25 MMOL/L (ref 22–29)
COLOR UR: ABNORMAL
CREAT SERPL-MCNC: 0.7 MG/DL (ref 0.76–1.27)
CRP SERPL-MCNC: 19.17 MG/DL (ref 0–0.5)
D DIMER PPP FEU-MCNC: 3.38 MG/L (FEU) (ref 0–0.5)
DEPRECATED RDW RBC AUTO: 49.1 FL (ref 37–54)
EOSINOPHIL # BLD AUTO: 0.86 10*3/MM3 (ref 0–0.4)
EOSINOPHIL NFR BLD AUTO: 8.2 % (ref 0.3–6.2)
ERYTHROCYTE [DISTWIDTH] IN BLOOD BY AUTOMATED COUNT: 17.2 % (ref 12.3–15.4)
FERRITIN SERPL-MCNC: 973.9 NG/ML (ref 30–400)
GFR SERPL CREATININE-BSD FRML MDRD: 120 ML/MIN/1.73
GLOBULIN UR ELPH-MCNC: 5.5 GM/DL
GLUCOSE BLDC GLUCOMTR-MCNC: 80 MG/DL (ref 70–130)
GLUCOSE BLDC GLUCOMTR-MCNC: 85 MG/DL (ref 70–130)
GLUCOSE BLDC GLUCOMTR-MCNC: 95 MG/DL (ref 70–130)
GLUCOSE BLDC GLUCOMTR-MCNC: 96 MG/DL (ref 70–130)
GLUCOSE SERPL-MCNC: 93 MG/DL (ref 65–99)
GLUCOSE UR STRIP-MCNC: NEGATIVE MG/DL
HCT VFR BLD AUTO: 40.3 % (ref 37.5–51)
HGB BLD-MCNC: 12 G/DL (ref 13–17.7)
HGB UR QL STRIP.AUTO: NEGATIVE
HYALINE CASTS UR QL AUTO: ABNORMAL /LPF
IMM GRANULOCYTES # BLD AUTO: 0.09 10*3/MM3 (ref 0–0.05)
IMM GRANULOCYTES NFR BLD AUTO: 0.9 % (ref 0–0.5)
KETONES UR QL STRIP: NEGATIVE
LEUKOCYTE ESTERASE UR QL STRIP.AUTO: ABNORMAL
LYMPHOCYTES # BLD AUTO: 1.65 10*3/MM3 (ref 0.7–3.1)
LYMPHOCYTES NFR BLD AUTO: 15.7 % (ref 19.6–45.3)
MCH RBC QN AUTO: 24.7 PG (ref 26.6–33)
MCHC RBC AUTO-ENTMCNC: 29.8 G/DL (ref 31.5–35.7)
MCV RBC AUTO: 82.9 FL (ref 79–97)
MONOCYTES # BLD AUTO: 1.22 10*3/MM3 (ref 0.1–0.9)
MONOCYTES NFR BLD AUTO: 11.6 % (ref 5–12)
NEUTROPHILS NFR BLD AUTO: 6.62 10*3/MM3 (ref 1.7–7)
NEUTROPHILS NFR BLD AUTO: 62.8 % (ref 42.7–76)
NITRITE UR QL STRIP: NEGATIVE
NRBC BLD AUTO-RTO: 0 /100 WBC (ref 0–0.2)
PH UR STRIP.AUTO: 5.5 [PH] (ref 5–8)
PLATELET # BLD AUTO: 413 10*3/MM3 (ref 140–450)
PMV BLD AUTO: 11.6 FL (ref 6–12)
POTASSIUM SERPL-SCNC: 3.9 MMOL/L (ref 3.5–5.2)
PROT SERPL-MCNC: 8.3 G/DL (ref 6–8.5)
PROT UR QL STRIP: ABNORMAL
RBC # BLD AUTO: 4.86 10*6/MM3 (ref 4.14–5.8)
RBC # UR STRIP: ABNORMAL /HPF
REF LAB TEST METHOD: ABNORMAL
SODIUM SERPL-SCNC: 137 MMOL/L (ref 136–145)
SP GR UR STRIP: >1.03 (ref 1–1.03)
SQUAMOUS #/AREA URNS HPF: ABNORMAL /HPF
UROBILINOGEN UR QL STRIP: ABNORMAL
WBC # UR STRIP: ABNORMAL /HPF
WBC NRBC COR # BLD: 10.52 10*3/MM3 (ref 3.4–10.8)

## 2022-01-23 PROCEDURE — 94799 UNLISTED PULMONARY SVC/PX: CPT

## 2022-01-23 PROCEDURE — 99254 IP/OBS CNSLTJ NEW/EST MOD 60: CPT | Performed by: OTOLARYNGOLOGY

## 2022-01-23 PROCEDURE — 86140 C-REACTIVE PROTEIN: CPT | Performed by: INTERNAL MEDICINE

## 2022-01-23 PROCEDURE — 25010000002 PROPOFOL 10 MG/ML EMULSION: Performed by: INTERNAL MEDICINE

## 2022-01-23 PROCEDURE — 25010000002 CEFTRIAXONE PER 250 MG: Performed by: INTERNAL MEDICINE

## 2022-01-23 PROCEDURE — 25010000002 ENOXAPARIN PER 10 MG: Performed by: INTERNAL MEDICINE

## 2022-01-23 PROCEDURE — 25010000002 METOCLOPRAMIDE PER 10 MG: Performed by: INTERNAL MEDICINE

## 2022-01-23 PROCEDURE — 99232 SBSQ HOSP IP/OBS MODERATE 35: CPT | Performed by: INTERNAL MEDICINE

## 2022-01-23 PROCEDURE — 25010000002 PROPOFOL 1000 MG/100ML EMULSION: Performed by: INTERNAL MEDICINE

## 2022-01-23 PROCEDURE — 82728 ASSAY OF FERRITIN: CPT | Performed by: INTERNAL MEDICINE

## 2022-01-23 PROCEDURE — 85379 FIBRIN DEGRADATION QUANT: CPT | Performed by: INTERNAL MEDICINE

## 2022-01-23 PROCEDURE — 82550 ASSAY OF CK (CPK): CPT | Performed by: INTERNAL MEDICINE

## 2022-01-23 PROCEDURE — 99222 1ST HOSP IP/OBS MODERATE 55: CPT | Performed by: INTERNAL MEDICINE

## 2022-01-23 PROCEDURE — 25010000002 LORAZEPAM PER 2 MG: Performed by: INTERNAL MEDICINE

## 2022-01-23 PROCEDURE — 82962 GLUCOSE BLOOD TEST: CPT

## 2022-01-23 PROCEDURE — 85025 COMPLETE CBC W/AUTO DIFF WBC: CPT | Performed by: INTERNAL MEDICINE

## 2022-01-23 PROCEDURE — 94003 VENT MGMT INPAT SUBQ DAY: CPT

## 2022-01-23 PROCEDURE — 81001 URINALYSIS AUTO W/SCOPE: CPT | Performed by: INTERNAL MEDICINE

## 2022-01-23 PROCEDURE — 80053 COMPREHEN METABOLIC PANEL: CPT | Performed by: INTERNAL MEDICINE

## 2022-01-23 PROCEDURE — 74018 RADEX ABDOMEN 1 VIEW: CPT

## 2022-01-23 PROCEDURE — 05HY33Z INSERTION OF INFUSION DEVICE INTO UPPER VEIN, PERCUTANEOUS APPROACH: ICD-10-PCS | Performed by: INTERNAL MEDICINE

## 2022-01-23 PROCEDURE — C1751 CATH, INF, PER/CENT/MIDLINE: HCPCS

## 2022-01-23 RX ORDER — LIDOCAINE HYDROCHLORIDE 10 MG/ML
1 INJECTION, SOLUTION EPIDURAL; INFILTRATION; INTRACAUDAL; PERINEURAL ONCE
Status: COMPLETED | OUTPATIENT
Start: 2022-01-23 | End: 2022-01-23

## 2022-01-23 RX ORDER — DEXMEDETOMIDINE HYDROCHLORIDE 4 UG/ML
.2-1.5 INJECTION, SOLUTION INTRAVENOUS
Status: DISCONTINUED | OUTPATIENT
Start: 2022-01-23 | End: 2022-02-02

## 2022-01-23 RX ADMIN — GUAIFENESIN 400 MG: 200 SOLUTION ORAL at 08:00

## 2022-01-23 RX ADMIN — DEXMEDETOMIDINE HYDROCHLORIDE 1.5 MCG/KG/HR: 4 INJECTION, SOLUTION INTRAVENOUS at 07:53

## 2022-01-23 RX ADMIN — PROPOFOL 70 MCG/KG/MIN: 10 INJECTION, EMULSION INTRAVENOUS at 14:25

## 2022-01-23 RX ADMIN — OXYCODONE HYDROCHLORIDE 5 MG: 5 SOLUTION ORAL at 07:59

## 2022-01-23 RX ADMIN — LORAZEPAM 1 MG: 2 INJECTION INTRAMUSCULAR; INTRAVENOUS at 22:13

## 2022-01-23 RX ADMIN — THIAMINE HCL TAB 100 MG 100 MG: 100 TAB at 12:27

## 2022-01-23 RX ADMIN — POLYETHYLENE GLYCOL 3350 17 G: 17 POWDER, FOR SOLUTION ORAL at 08:00

## 2022-01-23 RX ADMIN — PROPOFOL 70 MCG/KG/MIN: 10 INJECTION, EMULSION INTRAVENOUS at 22:12

## 2022-01-23 RX ADMIN — DEXMEDETOMIDINE HYDROCHLORIDE 1.5 MCG/KG/HR: 4 INJECTION, SOLUTION INTRAVENOUS at 02:30

## 2022-01-23 RX ADMIN — BUDESONIDE AND FORMOTEROL FUMARATE DIHYDRATE 2 PUFF: 160; 4.5 AEROSOL RESPIRATORY (INHALATION) at 07:15

## 2022-01-23 RX ADMIN — ALBUTEROL SULFATE 6 PUFF: 90 AEROSOL, METERED RESPIRATORY (INHALATION) at 14:17

## 2022-01-23 RX ADMIN — LIDOCAINE HYDROCHLORIDE ANHYDROUS 1 ML: 10 INJECTION, SOLUTION INFILTRATION at 11:35

## 2022-01-23 RX ADMIN — PROPOFOL 70 MCG/KG/MIN: 10 INJECTION, EMULSION INTRAVENOUS at 12:27

## 2022-01-23 RX ADMIN — PROPOFOL 65 MCG/KG/MIN: 10 INJECTION, EMULSION INTRAVENOUS at 02:30

## 2022-01-23 RX ADMIN — DOCUSATE SODIUM 50 MG AND SENNOSIDES 8.6 MG 1 TABLET: 8.6; 5 TABLET, FILM COATED ORAL at 20:48

## 2022-01-23 RX ADMIN — DEXMEDETOMIDINE HYDROCHLORIDE 1.5 MCG/KG/HR: 4 INJECTION, SOLUTION INTRAVENOUS at 20:48

## 2022-01-23 RX ADMIN — LEUCINE, PHENYLALANINE, LYSINE, METHIONINE, ISOLEUCINE, VALINE, HISTIDINE, THREONINE, TRYPTOPHAN, ALANINE, GLYCINE, ARGININE, PROLINE, SERINE, TYROSINE, SODIUM ACETATE, DIBASIC POTASSIUM PHOSPHATE, MAGNESIUM CHLORIDE, SODIUM CHLORIDE, CALCIUM CHLORIDE, DEXTROSE
365; 280; 290; 200; 300; 290; 240; 210; 90; 1035; 515; 575; 340; 250; 20; 340; 261; 51; 59; 33; 15 INJECTION INTRAVENOUS at 18:04

## 2022-01-23 RX ADMIN — PANTOPRAZOLE SODIUM 40 MG: 40 INJECTION, POWDER, FOR SOLUTION INTRAVENOUS at 20:48

## 2022-01-23 RX ADMIN — DEXMEDETOMIDINE HYDROCHLORIDE 1.5 MCG/KG/HR: 4 INJECTION, SOLUTION INTRAVENOUS at 18:03

## 2022-01-23 RX ADMIN — I.V. FAT EMULSION 50 G: 20 EMULSION INTRAVENOUS at 18:05

## 2022-01-23 RX ADMIN — SODIUM CHLORIDE 1 G: 900 INJECTION INTRAVENOUS at 07:46

## 2022-01-23 RX ADMIN — ATORVASTATIN CALCIUM 20 MG: 10 TABLET, FILM COATED ORAL at 20:47

## 2022-01-23 RX ADMIN — LORAZEPAM 1 MG: 2 INJECTION INTRAMUSCULAR; INTRAVENOUS at 13:05

## 2022-01-23 RX ADMIN — DEXMEDETOMIDINE HYDROCHLORIDE 1.5 MCG/KG/HR: 4 INJECTION, SOLUTION INTRAVENOUS at 13:01

## 2022-01-23 RX ADMIN — METOCLOPRAMIDE HYDROCHLORIDE 10 MG: 5 INJECTION INTRAMUSCULAR; INTRAVENOUS at 16:21

## 2022-01-23 RX ADMIN — DEXMEDETOMIDINE HYDROCHLORIDE 1.5 MCG/KG/HR: 4 INJECTION, SOLUTION INTRAVENOUS at 16:17

## 2022-01-23 RX ADMIN — GUAIFENESIN 400 MG: 200 SOLUTION ORAL at 16:18

## 2022-01-23 RX ADMIN — DOCUSATE SODIUM 50 MG AND SENNOSIDES 8.6 MG 1 TABLET: 8.6; 5 TABLET, FILM COATED ORAL at 08:00

## 2022-01-23 RX ADMIN — DEXMEDETOMIDINE HYDROCHLORIDE 1.5 MCG/KG/HR: 4 INJECTION, SOLUTION INTRAVENOUS at 23:24

## 2022-01-23 RX ADMIN — METOCLOPRAMIDE HYDROCHLORIDE 10 MG: 5 INJECTION INTRAMUSCULAR; INTRAVENOUS at 20:48

## 2022-01-23 RX ADMIN — BUDESONIDE AND FORMOTEROL FUMARATE DIHYDRATE 2 PUFF: 160; 4.5 AEROSOL RESPIRATORY (INHALATION) at 19:09

## 2022-01-23 RX ADMIN — ENOXAPARIN SODIUM 90 MG: 100 INJECTION SUBCUTANEOUS at 06:33

## 2022-01-23 RX ADMIN — METOCLOPRAMIDE HYDROCHLORIDE 10 MG: 5 INJECTION INTRAMUSCULAR; INTRAVENOUS at 06:33

## 2022-01-23 RX ADMIN — PANTOPRAZOLE SODIUM 40 MG: 40 INJECTION, POWDER, FOR SOLUTION INTRAVENOUS at 08:01

## 2022-01-23 RX ADMIN — GUAIFENESIN 400 MG: 200 SOLUTION ORAL at 00:21

## 2022-01-23 RX ADMIN — OXYCODONE HYDROCHLORIDE 5 MG: 5 SOLUTION ORAL at 20:47

## 2022-01-23 RX ADMIN — ALBUTEROL SULFATE 6 PUFF: 90 AEROSOL, METERED RESPIRATORY (INHALATION) at 10:58

## 2022-01-23 RX ADMIN — PROPOFOL 70 MCG/KG/MIN: 10 INJECTION, EMULSION INTRAVENOUS at 20:49

## 2022-01-23 RX ADMIN — ALBUTEROL SULFATE 6 PUFF: 90 AEROSOL, METERED RESPIRATORY (INHALATION) at 19:09

## 2022-01-23 RX ADMIN — ALBUTEROL SULFATE 6 PUFF: 90 AEROSOL, METERED RESPIRATORY (INHALATION) at 07:12

## 2022-01-23 RX ADMIN — ENOXAPARIN SODIUM 90 MG: 100 INJECTION SUBCUTANEOUS at 18:18

## 2022-01-23 RX ADMIN — METOCLOPRAMIDE HYDROCHLORIDE 10 MG: 5 INJECTION INTRAMUSCULAR; INTRAVENOUS at 12:27

## 2022-01-23 RX ADMIN — PROPOFOL 70 MCG/KG/MIN: 10 INJECTION, EMULSION INTRAVENOUS at 09:24

## 2022-01-23 RX ADMIN — PROPOFOL 70 MCG/KG/MIN: 10 INJECTION, EMULSION INTRAVENOUS at 04:14

## 2022-01-23 RX ADMIN — DEXMEDETOMIDINE HYDROCHLORIDE 1.5 MCG/KG/HR: 4 INJECTION, SOLUTION INTRAVENOUS at 10:26

## 2022-01-23 RX ADMIN — PROPOFOL 70 MCG/KG/MIN: 10 INJECTION, EMULSION INTRAVENOUS at 18:03

## 2022-01-23 RX ADMIN — ZINC SULFATE 220 MG (50 MG) CAPSULE 220 MG: CAPSULE at 08:01

## 2022-01-23 RX ADMIN — OXYCODONE HYDROCHLORIDE 5 MG: 5 SOLUTION ORAL at 17:42

## 2022-01-23 RX ADMIN — LORAZEPAM 1 MG: 2 INJECTION INTRAMUSCULAR; INTRAVENOUS at 05:04

## 2022-01-23 RX ADMIN — OXYCODONE HYDROCHLORIDE AND ACETAMINOPHEN 500 MG: 500 TABLET ORAL at 08:00

## 2022-01-23 RX ADMIN — PROPOFOL 70 MCG/KG/MIN: 10 INJECTION, EMULSION INTRAVENOUS at 06:33

## 2022-01-23 RX ADMIN — DEXMEDETOMIDINE HYDROCHLORIDE 1.5 MCG/KG/HR: 4 INJECTION, SOLUTION INTRAVENOUS at 04:15

## 2022-01-23 RX ADMIN — Medication 1000 UNITS: at 08:00

## 2022-01-24 LAB
ARTERIAL PATENCY WRIST A: POSITIVE
ATMOSPHERIC PRESS: 748 MMHG
BASE EXCESS BLDA CALC-SCNC: 1.4 MMOL/L (ref 0–2)
BDY SITE: ABNORMAL
BODY TEMPERATURE: 37 C
GLUCOSE BLDC GLUCOMTR-MCNC: 108 MG/DL (ref 70–130)
GLUCOSE BLDC GLUCOMTR-MCNC: 109 MG/DL (ref 70–130)
GLUCOSE BLDC GLUCOMTR-MCNC: 113 MG/DL (ref 70–130)
HCO3 BLDA-SCNC: 25.4 MMOL/L (ref 20–26)
INHALED O2 CONCENTRATION: 30 %
Lab: ABNORMAL
MODALITY: ABNORMAL
PAW @ PEAK INSP FLOW SETTING VENT: 12 CMH2O
PCO2 BLDA: 37.2 MM HG (ref 35–45)
PCO2 TEMP ADJ BLD: 37.2 MM HG (ref 35–45)
PEEP RESPIRATORY: 5 CM[H2O]
PH BLDA: 7.44 PH UNITS (ref 7.35–7.45)
PH, TEMP CORRECTED: 7.44 PH UNITS (ref 7.35–7.45)
PO2 BLDA: 77.9 MM HG (ref 83–108)
PO2 TEMP ADJ BLD: 77.9 MM HG (ref 83–108)
SAO2 % BLDCOA: 96.5 % (ref 94–99)
SET MECH RESP RATE: 18
VENTILATOR MODE: ABNORMAL

## 2022-01-24 PROCEDURE — 99222 1ST HOSP IP/OBS MODERATE 55: CPT | Performed by: NURSE PRACTITIONER

## 2022-01-24 PROCEDURE — 25010000002 LORAZEPAM PER 2 MG: Performed by: INTERNAL MEDICINE

## 2022-01-24 PROCEDURE — 82962 GLUCOSE BLOOD TEST: CPT

## 2022-01-24 PROCEDURE — 99233 SBSQ HOSP IP/OBS HIGH 50: CPT | Performed by: INTERNAL MEDICINE

## 2022-01-24 PROCEDURE — 25010000002 PROPOFOL 10 MG/ML EMULSION: Performed by: INTERNAL MEDICINE

## 2022-01-24 PROCEDURE — 94003 VENT MGMT INPAT SUBQ DAY: CPT

## 2022-01-24 PROCEDURE — 99232 SBSQ HOSP IP/OBS MODERATE 35: CPT | Performed by: OTOLARYNGOLOGY

## 2022-01-24 PROCEDURE — 94799 UNLISTED PULMONARY SVC/PX: CPT

## 2022-01-24 PROCEDURE — 25010000002 MORPHINE PER 10 MG: Performed by: INTERNAL MEDICINE

## 2022-01-24 PROCEDURE — 25010000002 METOCLOPRAMIDE PER 10 MG: Performed by: INTERNAL MEDICINE

## 2022-01-24 PROCEDURE — 25010000002 CEFTRIAXONE PER 250 MG: Performed by: INTERNAL MEDICINE

## 2022-01-24 PROCEDURE — 25010000002 ENOXAPARIN PER 10 MG: Performed by: INTERNAL MEDICINE

## 2022-01-24 PROCEDURE — 82803 BLOOD GASES ANY COMBINATION: CPT

## 2022-01-24 PROCEDURE — 36600 WITHDRAWAL OF ARTERIAL BLOOD: CPT

## 2022-01-24 RX ADMIN — PROPOFOL 70 MCG/KG/MIN: 10 INJECTION, EMULSION INTRAVENOUS at 06:40

## 2022-01-24 RX ADMIN — BUDESONIDE AND FORMOTEROL FUMARATE DIHYDRATE 2 PUFF: 160; 4.5 AEROSOL RESPIRATORY (INHALATION) at 06:50

## 2022-01-24 RX ADMIN — DEXMEDETOMIDINE HYDROCHLORIDE 1 MCG/KG/HR: 4 INJECTION, SOLUTION INTRAVENOUS at 17:54

## 2022-01-24 RX ADMIN — ASCORBIC ACID, VITAMIN A PALMITATE, CHOLECALCIFEROL, THIAMINE HYDROCHLORIDE, RIBOFLAVIN-5 PHOSPHATE SODIUM, PYRIDOXINE HYDROCHLORIDE, NIACINAMIDE, DEXPANTHENOL, ALPHA-TOCOPHEROL ACETATE, VITAMIN K1, FOLIC ACID, BIOTIN, CYANOCOBALAMIN: 200; 3300; 200; 6; 3.6; 6; 40; 15; 10; 150; 600; 60; 5 INJECTION, SOLUTION INTRAVENOUS at 17:54

## 2022-01-24 RX ADMIN — ZINC SULFATE 220 MG (50 MG) CAPSULE 220 MG: CAPSULE at 11:50

## 2022-01-24 RX ADMIN — ENOXAPARIN SODIUM 90 MG: 100 INJECTION SUBCUTANEOUS at 06:21

## 2022-01-24 RX ADMIN — LORAZEPAM 1 MG: 2 INJECTION INTRAMUSCULAR; INTRAVENOUS at 21:00

## 2022-01-24 RX ADMIN — PROPOFOL 50 MCG/KG/MIN: 10 INJECTION, EMULSION INTRAVENOUS at 17:54

## 2022-01-24 RX ADMIN — DEXMEDETOMIDINE HYDROCHLORIDE 1.5 MCG/KG/HR: 4 INJECTION, SOLUTION INTRAVENOUS at 10:00

## 2022-01-24 RX ADMIN — PROPOFOL 70 MCG/KG/MIN: 10 INJECTION, EMULSION INTRAVENOUS at 00:40

## 2022-01-24 RX ADMIN — ALBUTEROL SULFATE 6 PUFF: 90 AEROSOL, METERED RESPIRATORY (INHALATION) at 06:50

## 2022-01-24 RX ADMIN — OXYCODONE HYDROCHLORIDE 5 MG: 5 SOLUTION ORAL at 09:20

## 2022-01-24 RX ADMIN — OXYCODONE HYDROCHLORIDE 5 MG: 5 SOLUTION ORAL at 20:40

## 2022-01-24 RX ADMIN — GUAIFENESIN 400 MG: 200 SOLUTION ORAL at 00:41

## 2022-01-24 RX ADMIN — DEXMEDETOMIDINE HYDROCHLORIDE 1.5 MCG/KG/HR: 4 INJECTION, SOLUTION INTRAVENOUS at 07:12

## 2022-01-24 RX ADMIN — POLYETHYLENE GLYCOL 3350 17 G: 17 POWDER, FOR SOLUTION ORAL at 09:20

## 2022-01-24 RX ADMIN — PANTOPRAZOLE SODIUM 40 MG: 40 INJECTION, POWDER, FOR SOLUTION INTRAVENOUS at 09:20

## 2022-01-24 RX ADMIN — THIAMINE HCL TAB 100 MG 100 MG: 100 TAB at 09:20

## 2022-01-24 RX ADMIN — METOCLOPRAMIDE HYDROCHLORIDE 10 MG: 5 INJECTION INTRAMUSCULAR; INTRAVENOUS at 12:02

## 2022-01-24 RX ADMIN — GUAIFENESIN 400 MG: 200 SOLUTION ORAL at 16:05

## 2022-01-24 RX ADMIN — ALBUTEROL SULFATE 6 PUFF: 90 AEROSOL, METERED RESPIRATORY (INHALATION) at 10:58

## 2022-01-24 RX ADMIN — SODIUM CHLORIDE 1 G: 900 INJECTION INTRAVENOUS at 09:19

## 2022-01-24 RX ADMIN — LORAZEPAM 1 MG: 2 INJECTION INTRAMUSCULAR; INTRAVENOUS at 06:21

## 2022-01-24 RX ADMIN — PROPOFOL 55 MCG/KG/MIN: 10 INJECTION, EMULSION INTRAVENOUS at 11:21

## 2022-01-24 RX ADMIN — ALBUTEROL SULFATE 6 PUFF: 90 AEROSOL, METERED RESPIRATORY (INHALATION) at 19:55

## 2022-01-24 RX ADMIN — DEXMEDETOMIDINE HYDROCHLORIDE 1.5 MCG/KG/HR: 4 INJECTION, SOLUTION INTRAVENOUS at 02:06

## 2022-01-24 RX ADMIN — PROPOFOL 70 MCG/KG/MIN: 10 INJECTION, EMULSION INTRAVENOUS at 02:07

## 2022-01-24 RX ADMIN — PROPOFOL 70 MCG/KG/MIN: 10 INJECTION, EMULSION INTRAVENOUS at 09:22

## 2022-01-24 RX ADMIN — METOCLOPRAMIDE HYDROCHLORIDE 10 MG: 5 INJECTION INTRAMUSCULAR; INTRAVENOUS at 09:20

## 2022-01-24 RX ADMIN — LORAZEPAM 1 MG: 2 INJECTION INTRAMUSCULAR; INTRAVENOUS at 16:05

## 2022-01-24 RX ADMIN — BUDESONIDE AND FORMOTEROL FUMARATE DIHYDRATE 2 PUFF: 160; 4.5 AEROSOL RESPIRATORY (INHALATION) at 19:55

## 2022-01-24 RX ADMIN — ATORVASTATIN CALCIUM 20 MG: 10 TABLET, FILM COATED ORAL at 20:39

## 2022-01-24 RX ADMIN — OXYCODONE HYDROCHLORIDE AND ACETAMINOPHEN 500 MG: 500 TABLET ORAL at 09:19

## 2022-01-24 RX ADMIN — GUAIFENESIN 400 MG: 200 SOLUTION ORAL at 09:19

## 2022-01-24 RX ADMIN — METOCLOPRAMIDE HYDROCHLORIDE 10 MG: 5 INJECTION INTRAMUSCULAR; INTRAVENOUS at 16:13

## 2022-01-24 RX ADMIN — PROPOFOL 70 MCG/KG/MIN: 10 INJECTION, EMULSION INTRAVENOUS at 04:36

## 2022-01-24 RX ADMIN — PROPOFOL 50 MCG/KG/MIN: 10 INJECTION, EMULSION INTRAVENOUS at 20:40

## 2022-01-24 RX ADMIN — ALBUTEROL SULFATE 6 PUFF: 90 AEROSOL, METERED RESPIRATORY (INHALATION) at 15:00

## 2022-01-24 RX ADMIN — DOCUSATE SODIUM 50 MG AND SENNOSIDES 8.6 MG 1 TABLET: 8.6; 5 TABLET, FILM COATED ORAL at 20:39

## 2022-01-24 RX ADMIN — OXYCODONE HYDROCHLORIDE 5 MG: 5 SOLUTION ORAL at 16:05

## 2022-01-24 RX ADMIN — METOCLOPRAMIDE HYDROCHLORIDE 10 MG: 5 INJECTION INTRAMUSCULAR; INTRAVENOUS at 20:40

## 2022-01-24 RX ADMIN — DOCUSATE SODIUM 50 MG AND SENNOSIDES 8.6 MG 1 TABLET: 8.6; 5 TABLET, FILM COATED ORAL at 09:19

## 2022-01-24 RX ADMIN — PANTOPRAZOLE SODIUM 40 MG: 40 INJECTION, POWDER, FOR SOLUTION INTRAVENOUS at 20:40

## 2022-01-24 RX ADMIN — DEXMEDETOMIDINE HYDROCHLORIDE 1 MCG/KG/HR: 4 INJECTION, SOLUTION INTRAVENOUS at 20:40

## 2022-01-24 RX ADMIN — Medication 1000 UNITS: at 09:19

## 2022-01-24 RX ADMIN — MORPHINE SULFATE 3 MG: 4 INJECTION INTRAVENOUS at 15:10

## 2022-01-24 RX ADMIN — DEXMEDETOMIDINE HYDROCHLORIDE 1.5 MCG/KG/HR: 4 INJECTION, SOLUTION INTRAVENOUS at 04:36

## 2022-01-25 ENCOUNTER — ANESTHESIA EVENT (OUTPATIENT)
Dept: PERIOP | Facility: HOSPITAL | Age: 52
End: 2022-01-25

## 2022-01-25 ENCOUNTER — ANESTHESIA (OUTPATIENT)
Dept: PERIOP | Facility: HOSPITAL | Age: 52
End: 2022-01-25

## 2022-01-25 ENCOUNTER — APPOINTMENT (OUTPATIENT)
Dept: GENERAL RADIOLOGY | Facility: HOSPITAL | Age: 52
End: 2022-01-25

## 2022-01-25 PROBLEM — Z98.890 HX OF TRACHEOSTOMY: Status: ACTIVE | Noted: 2022-01-25

## 2022-01-25 LAB
ALBUMIN SERPL-MCNC: 2.6 G/DL (ref 3.5–5.2)
ALBUMIN/GLOB SERPL: 0.6 G/DL
ALP SERPL-CCNC: 101 U/L (ref 39–117)
ALT SERPL W P-5'-P-CCNC: 33 U/L (ref 1–41)
ANION GAP SERPL CALCULATED.3IONS-SCNC: 8 MMOL/L (ref 5–15)
ARTERIAL PATENCY WRIST A: POSITIVE
AST SERPL-CCNC: 39 U/L (ref 1–40)
ATMOSPHERIC PRESS: 751 MMHG
BASE EXCESS BLDA CALC-SCNC: 2.1 MMOL/L (ref 0–2)
BASOPHILS # BLD AUTO: 0.05 10*3/MM3 (ref 0–0.2)
BASOPHILS NFR BLD AUTO: 0.7 % (ref 0–1.5)
BDY SITE: ABNORMAL
BILIRUB SERPL-MCNC: 1 MG/DL (ref 0–1.2)
BODY TEMPERATURE: 37 C
BUN SERPL-MCNC: 21 MG/DL (ref 6–20)
BUN/CREAT SERPL: 38.2 (ref 7–25)
CALCIUM SPEC-SCNC: 8.1 MG/DL (ref 8.6–10.5)
CHLORIDE SERPL-SCNC: 103 MMOL/L (ref 98–107)
CO2 SERPL-SCNC: 25 MMOL/L (ref 22–29)
CREAT SERPL-MCNC: 0.55 MG/DL (ref 0.76–1.27)
DEPRECATED RDW RBC AUTO: 48.7 FL (ref 37–54)
EOSINOPHIL # BLD AUTO: 1 10*3/MM3 (ref 0–0.4)
EOSINOPHIL NFR BLD AUTO: 13.1 % (ref 0.3–6.2)
ERYTHROCYTE [DISTWIDTH] IN BLOOD BY AUTOMATED COUNT: 17.2 % (ref 12.3–15.4)
GFR SERPL CREATININE-BSD FRML MDRD: >150 ML/MIN/1.73
GLOBULIN UR ELPH-MCNC: 4.7 GM/DL
GLUCOSE BLDC GLUCOMTR-MCNC: 105 MG/DL (ref 70–130)
GLUCOSE BLDC GLUCOMTR-MCNC: 112 MG/DL (ref 70–130)
GLUCOSE SERPL-MCNC: 105 MG/DL (ref 65–99)
HCO3 BLDA-SCNC: 26.2 MMOL/L (ref 20–26)
HCT VFR BLD AUTO: 36.7 % (ref 37.5–51)
HGB BLD-MCNC: 10.9 G/DL (ref 13–17.7)
IMM GRANULOCYTES # BLD AUTO: 0.05 10*3/MM3 (ref 0–0.05)
IMM GRANULOCYTES NFR BLD AUTO: 0.7 % (ref 0–0.5)
INHALED O2 CONCENTRATION: 30 %
LYMPHOCYTES # BLD AUTO: 1.89 10*3/MM3 (ref 0.7–3.1)
LYMPHOCYTES NFR BLD AUTO: 24.8 % (ref 19.6–45.3)
Lab: ABNORMAL
MAGNESIUM SERPL-MCNC: 1.8 MG/DL (ref 1.6–2.6)
MCH RBC QN AUTO: 24.4 PG (ref 26.6–33)
MCHC RBC AUTO-ENTMCNC: 29.7 G/DL (ref 31.5–35.7)
MCV RBC AUTO: 82.1 FL (ref 79–97)
MODALITY: ABNORMAL
MONOCYTES # BLD AUTO: 0.97 10*3/MM3 (ref 0.1–0.9)
MONOCYTES NFR BLD AUTO: 12.7 % (ref 5–12)
NEUTROPHILS NFR BLD AUTO: 3.65 10*3/MM3 (ref 1.7–7)
NEUTROPHILS NFR BLD AUTO: 48 % (ref 42.7–76)
NRBC BLD AUTO-RTO: 0 /100 WBC (ref 0–0.2)
PCO2 BLDA: 38.2 MM HG (ref 35–45)
PCO2 TEMP ADJ BLD: 38.2 MM HG (ref 35–45)
PEEP RESPIRATORY: 5 CM[H2O]
PH BLDA: 7.45 PH UNITS (ref 7.35–7.45)
PH, TEMP CORRECTED: 7.45 PH UNITS (ref 7.35–7.45)
PHOSPHATE SERPL-MCNC: 3 MG/DL (ref 2.5–4.5)
PLATELET # BLD AUTO: 331 10*3/MM3 (ref 140–450)
PMV BLD AUTO: 11.8 FL (ref 6–12)
PO2 BLDA: 83.2 MM HG (ref 83–108)
PO2 TEMP ADJ BLD: 83.2 MM HG (ref 83–108)
POTASSIUM SERPL-SCNC: 3.7 MMOL/L (ref 3.5–5.2)
PROT SERPL-MCNC: 7.3 G/DL (ref 6–8.5)
PSV: 12 CMH2O
RBC # BLD AUTO: 4.47 10*6/MM3 (ref 4.14–5.8)
SAO2 % BLDCOA: 96.9 % (ref 94–99)
SET MECH RESP RATE: 18
SODIUM SERPL-SCNC: 136 MMOL/L (ref 136–145)
TRIGL SERPL-MCNC: 145 MG/DL (ref 0–150)
VENTILATOR MODE: ABNORMAL
WBC NRBC COR # BLD: 7.61 10*3/MM3 (ref 3.4–10.8)

## 2022-01-25 PROCEDURE — 94799 UNLISTED PULMONARY SVC/PX: CPT

## 2022-01-25 PROCEDURE — 80053 COMPREHEN METABOLIC PANEL: CPT | Performed by: INTERNAL MEDICINE

## 2022-01-25 PROCEDURE — 82803 BLOOD GASES ANY COMBINATION: CPT

## 2022-01-25 PROCEDURE — 25010000002 PROPOFOL 10 MG/ML EMULSION: Performed by: NURSE ANESTHETIST, CERTIFIED REGISTERED

## 2022-01-25 PROCEDURE — 84478 ASSAY OF TRIGLYCERIDES: CPT | Performed by: INTERNAL MEDICINE

## 2022-01-25 PROCEDURE — 99231 SBSQ HOSP IP/OBS SF/LOW 25: CPT | Performed by: INTERNAL MEDICINE

## 2022-01-25 PROCEDURE — 0CJS8ZZ INSPECTION OF LARYNX, VIA NATURAL OR ARTIFICIAL OPENING ENDOSCOPIC: ICD-10-PCS | Performed by: OTOLARYNGOLOGY

## 2022-01-25 PROCEDURE — 84100 ASSAY OF PHOSPHORUS: CPT | Performed by: INTERNAL MEDICINE

## 2022-01-25 PROCEDURE — 25010000002 PROPOFOL 10 MG/ML EMULSION: Performed by: INTERNAL MEDICINE

## 2022-01-25 PROCEDURE — 83735 ASSAY OF MAGNESIUM: CPT | Performed by: INTERNAL MEDICINE

## 2022-01-25 PROCEDURE — 0B110F4 BYPASS TRACHEA TO CUTANEOUS WITH TRACHEOSTOMY DEVICE, OPEN APPROACH: ICD-10-PCS | Performed by: OTOLARYNGOLOGY

## 2022-01-25 PROCEDURE — 99233 SBSQ HOSP IP/OBS HIGH 50: CPT | Performed by: NURSE PRACTITIONER

## 2022-01-25 PROCEDURE — 85025 COMPLETE CBC W/AUTO DIFF WBC: CPT | Performed by: INTERNAL MEDICINE

## 2022-01-25 PROCEDURE — 25010000002 FENTANYL CITRATE (PF) 100 MCG/2ML SOLUTION: Performed by: NURSE ANESTHETIST, CERTIFIED REGISTERED

## 2022-01-25 PROCEDURE — 99233 SBSQ HOSP IP/OBS HIGH 50: CPT | Performed by: INTERNAL MEDICINE

## 2022-01-25 PROCEDURE — 25010000002 METOCLOPRAMIDE PER 10 MG: Performed by: INTERNAL MEDICINE

## 2022-01-25 PROCEDURE — 25010000002 LORAZEPAM PER 2 MG: Performed by: INTERNAL MEDICINE

## 2022-01-25 PROCEDURE — 94003 VENT MGMT INPAT SUBQ DAY: CPT

## 2022-01-25 PROCEDURE — 82962 GLUCOSE BLOOD TEST: CPT

## 2022-01-25 PROCEDURE — 31600 PLANNED TRACHEOSTOMY: CPT | Performed by: OTOLARYNGOLOGY

## 2022-01-25 PROCEDURE — 25010000002 CEFTRIAXONE PER 250 MG: Performed by: INTERNAL MEDICINE

## 2022-01-25 PROCEDURE — 31525 DX LARYNGOSCOPY EXCL NB: CPT | Performed by: OTOLARYNGOLOGY

## 2022-01-25 PROCEDURE — 71045 X-RAY EXAM CHEST 1 VIEW: CPT

## 2022-01-25 PROCEDURE — 36600 WITHDRAWAL OF ARTERIAL BLOOD: CPT

## 2022-01-25 RX ORDER — PROPOFOL 10 MG/ML
VIAL (ML) INTRAVENOUS AS NEEDED
Status: DISCONTINUED | OUTPATIENT
Start: 2022-01-25 | End: 2022-01-25 | Stop reason: SURG

## 2022-01-25 RX ORDER — LIDOCAINE HYDROCHLORIDE AND EPINEPHRINE 10; 10 MG/ML; UG/ML
INJECTION, SOLUTION INFILTRATION; PERINEURAL AS NEEDED
Status: DISCONTINUED | OUTPATIENT
Start: 2022-01-25 | End: 2022-01-25 | Stop reason: HOSPADM

## 2022-01-25 RX ORDER — ROCURONIUM BROMIDE 10 MG/ML
INJECTION, SOLUTION INTRAVENOUS AS NEEDED
Status: DISCONTINUED | OUTPATIENT
Start: 2022-01-25 | End: 2022-01-25 | Stop reason: SURG

## 2022-01-25 RX ORDER — OXYCODONE HCL 5 MG/5 ML
5 SOLUTION, ORAL ORAL 3 TIMES DAILY
Status: DISCONTINUED | OUTPATIENT
Start: 2022-01-25 | End: 2022-01-26

## 2022-01-25 RX ORDER — PHENYLEPHRINE HCL IN 0.9% NACL 1 MG/10 ML
SYRINGE (ML) INTRAVENOUS AS NEEDED
Status: DISCONTINUED | OUTPATIENT
Start: 2022-01-25 | End: 2022-01-25 | Stop reason: SURG

## 2022-01-25 RX ORDER — SODIUM CHLORIDE, SODIUM LACTATE, POTASSIUM CHLORIDE, CALCIUM CHLORIDE 600; 310; 30; 20 MG/100ML; MG/100ML; MG/100ML; MG/100ML
INJECTION, SOLUTION INTRAVENOUS CONTINUOUS PRN
Status: DISCONTINUED | OUTPATIENT
Start: 2022-01-25 | End: 2022-01-25 | Stop reason: SURG

## 2022-01-25 RX ORDER — MAGNESIUM HYDROXIDE 1200 MG/15ML
LIQUID ORAL AS NEEDED
Status: DISCONTINUED | OUTPATIENT
Start: 2022-01-25 | End: 2022-01-25 | Stop reason: HOSPADM

## 2022-01-25 RX ORDER — FENTANYL CITRATE 50 UG/ML
INJECTION, SOLUTION INTRAMUSCULAR; INTRAVENOUS AS NEEDED
Status: DISCONTINUED | OUTPATIENT
Start: 2022-01-25 | End: 2022-01-25 | Stop reason: SURG

## 2022-01-25 RX ADMIN — METOCLOPRAMIDE HYDROCHLORIDE 10 MG: 5 INJECTION INTRAMUSCULAR; INTRAVENOUS at 22:35

## 2022-01-25 RX ADMIN — PROPOFOL 60 MCG/KG/MIN: 10 INJECTION, EMULSION INTRAVENOUS at 11:33

## 2022-01-25 RX ADMIN — PROPOFOL 60 MCG/KG/MIN: 10 INJECTION, EMULSION INTRAVENOUS at 00:41

## 2022-01-25 RX ADMIN — DEXMEDETOMIDINE HYDROCHLORIDE 1 MCG/KG/HR: 4 INJECTION, SOLUTION INTRAVENOUS at 04:23

## 2022-01-25 RX ADMIN — PROPOFOL 60 MCG/KG/MIN: 10 INJECTION, EMULSION INTRAVENOUS at 17:07

## 2022-01-25 RX ADMIN — DEXMEDETOMIDINE HYDROCHLORIDE 1 MCG/KG/HR: 4 INJECTION, SOLUTION INTRAVENOUS at 08:27

## 2022-01-25 RX ADMIN — FENTANYL CITRATE 50 MCG: 50 INJECTION, SOLUTION INTRAMUSCULAR; INTRAVENOUS at 18:47

## 2022-01-25 RX ADMIN — ATORVASTATIN CALCIUM 20 MG: 10 TABLET, FILM COATED ORAL at 22:36

## 2022-01-25 RX ADMIN — PROPOFOL 60 MCG/KG/MIN: 10 INJECTION, EMULSION INTRAVENOUS at 19:37

## 2022-01-25 RX ADMIN — DOCUSATE SODIUM 50 MG AND SENNOSIDES 8.6 MG 1 TABLET: 8.6; 5 TABLET, FILM COATED ORAL at 08:26

## 2022-01-25 RX ADMIN — ZINC SULFATE 220 MG (50 MG) CAPSULE 220 MG: CAPSULE at 08:26

## 2022-01-25 RX ADMIN — PROPOFOL 60 MCG/KG/MIN: 10 INJECTION, EMULSION INTRAVENOUS at 22:36

## 2022-01-25 RX ADMIN — DOCUSATE SODIUM 50 MG AND SENNOSIDES 8.6 MG 1 TABLET: 8.6; 5 TABLET, FILM COATED ORAL at 22:35

## 2022-01-25 RX ADMIN — OXYCODONE HYDROCHLORIDE 5 MG: 5 SOLUTION ORAL at 08:26

## 2022-01-25 RX ADMIN — PROPOFOL 60 MCG/KG/MIN: 10 INJECTION, EMULSION INTRAVENOUS at 08:25

## 2022-01-25 RX ADMIN — GUAIFENESIN 400 MG: 200 SOLUTION ORAL at 00:40

## 2022-01-25 RX ADMIN — POLYETHYLENE GLYCOL 3350 17 G: 17 POWDER, FOR SOLUTION ORAL at 08:26

## 2022-01-25 RX ADMIN — LORAZEPAM 1 MG: 2 INJECTION INTRAMUSCULAR; INTRAVENOUS at 14:31

## 2022-01-25 RX ADMIN — LORAZEPAM 1 MG: 2 INJECTION INTRAMUSCULAR; INTRAVENOUS at 06:38

## 2022-01-25 RX ADMIN — PROPOFOL 60 MCG/KG/MIN: 10 INJECTION, EMULSION INTRAVENOUS at 04:24

## 2022-01-25 RX ADMIN — PANTOPRAZOLE SODIUM 40 MG: 40 INJECTION, POWDER, FOR SOLUTION INTRAVENOUS at 22:35

## 2022-01-25 RX ADMIN — METOCLOPRAMIDE HYDROCHLORIDE 10 MG: 5 INJECTION INTRAMUSCULAR; INTRAVENOUS at 11:34

## 2022-01-25 RX ADMIN — METOCLOPRAMIDE HYDROCHLORIDE 10 MG: 5 INJECTION INTRAMUSCULAR; INTRAVENOUS at 06:38

## 2022-01-25 RX ADMIN — PROPOFOL 60 MCG/KG/MIN: 10 INJECTION, EMULSION INTRAVENOUS at 14:31

## 2022-01-25 RX ADMIN — DEXMEDETOMIDINE HYDROCHLORIDE 1 MCG/KG/HR: 4 INJECTION, SOLUTION INTRAVENOUS at 17:08

## 2022-01-25 RX ADMIN — DEXMEDETOMIDINE HYDROCHLORIDE 1 MCG/KG/HR: 4 INJECTION, SOLUTION INTRAVENOUS at 21:00

## 2022-01-25 RX ADMIN — OXYCODONE HYDROCHLORIDE AND ACETAMINOPHEN 500 MG: 500 TABLET ORAL at 08:26

## 2022-01-25 RX ADMIN — GUAIFENESIN 400 MG: 200 SOLUTION ORAL at 17:08

## 2022-01-25 RX ADMIN — Medication 100 MCG: at 19:03

## 2022-01-25 RX ADMIN — BUDESONIDE AND FORMOTEROL FUMARATE DIHYDRATE 2 PUFF: 160; 4.5 AEROSOL RESPIRATORY (INHALATION) at 08:28

## 2022-01-25 RX ADMIN — DEXMEDETOMIDINE HYDROCHLORIDE 1 MCG/KG/HR: 4 INJECTION, SOLUTION INTRAVENOUS at 11:33

## 2022-01-25 RX ADMIN — GUAIFENESIN 400 MG: 200 SOLUTION ORAL at 08:26

## 2022-01-25 RX ADMIN — SODIUM CHLORIDE, POTASSIUM CHLORIDE, SODIUM LACTATE AND CALCIUM CHLORIDE: 600; 310; 30; 20 INJECTION, SOLUTION INTRAVENOUS at 18:30

## 2022-01-25 RX ADMIN — LORAZEPAM 1 MG: 2 INJECTION INTRAMUSCULAR; INTRAVENOUS at 22:35

## 2022-01-25 RX ADMIN — OXYCODONE HYDROCHLORIDE 5 MG: 5 SOLUTION ORAL at 22:35

## 2022-01-25 RX ADMIN — DEXMEDETOMIDINE HYDROCHLORIDE 1 MCG/KG/HR: 4 INJECTION, SOLUTION INTRAVENOUS at 14:32

## 2022-01-25 RX ADMIN — DEXMEDETOMIDINE HYDROCHLORIDE 1 MCG/KG/HR: 4 INJECTION, SOLUTION INTRAVENOUS at 00:41

## 2022-01-25 RX ADMIN — Medication 1000 UNITS: at 08:26

## 2022-01-25 RX ADMIN — ALBUTEROL SULFATE 6 PUFF: 90 AEROSOL, METERED RESPIRATORY (INHALATION) at 08:28

## 2022-01-25 RX ADMIN — Medication 100 MCG: at 18:38

## 2022-01-25 RX ADMIN — ALBUTEROL SULFATE 6 PUFF: 90 AEROSOL, METERED RESPIRATORY (INHALATION) at 14:32

## 2022-01-25 RX ADMIN — THIAMINE HCL TAB 100 MG 100 MG: 100 TAB at 08:26

## 2022-01-25 RX ADMIN — SODIUM CHLORIDE 1 G: 900 INJECTION INTRAVENOUS at 08:38

## 2022-01-25 RX ADMIN — OXYCODONE HYDROCHLORIDE 5 MG: 5 SOLUTION ORAL at 17:08

## 2022-01-25 RX ADMIN — Medication 100 MCG: at 18:52

## 2022-01-25 RX ADMIN — DEXMEDETOMIDINE HYDROCHLORIDE 1 MCG/KG/HR: 4 INJECTION, SOLUTION INTRAVENOUS at 22:34

## 2022-01-25 RX ADMIN — ROCURONIUM BROMIDE 30 MG: 10 INJECTION INTRAVENOUS at 18:37

## 2022-01-25 RX ADMIN — PANTOPRAZOLE SODIUM 40 MG: 40 INJECTION, POWDER, FOR SOLUTION INTRAVENOUS at 08:26

## 2022-01-25 RX ADMIN — ALBUTEROL SULFATE 6 PUFF: 90 AEROSOL, METERED RESPIRATORY (INHALATION) at 10:19

## 2022-01-25 RX ADMIN — LEUCINE, PHENYLALANINE, LYSINE, METHIONINE, ISOLEUCINE, VALINE, HISTIDINE, THREONINE, TRYPTOPHAN, ALANINE, GLYCINE, ARGININE, PROLINE, SERINE, TYROSINE, SODIUM ACETATE, DIBASIC POTASSIUM PHOSPHATE, MAGNESIUM CHLORIDE, SODIUM CHLORIDE, CALCIUM CHLORIDE, DEXTROSE
365; 280; 290; 200; 300; 290; 240; 210; 90; 1035; 515; 575; 340; 250; 20; 340; 261; 51; 59; 33; 15 INJECTION INTRAVENOUS at 17:08

## 2022-01-25 RX ADMIN — PROPOFOL 50 MG: 10 INJECTION, EMULSION INTRAVENOUS at 18:36

## 2022-01-25 RX ADMIN — PROPOFOL 60 MCG/KG/MIN: 10 INJECTION, EMULSION INTRAVENOUS at 02:39

## 2022-01-25 RX ADMIN — PROPOFOL 60 MCG/KG/MIN: 10 INJECTION, EMULSION INTRAVENOUS at 06:38

## 2022-01-25 RX ADMIN — METOCLOPRAMIDE HYDROCHLORIDE 10 MG: 5 INJECTION INTRAMUSCULAR; INTRAVENOUS at 17:07

## 2022-01-25 RX ADMIN — FENTANYL CITRATE 50 MCG: 50 INJECTION, SOLUTION INTRAMUSCULAR; INTRAVENOUS at 18:36

## 2022-01-25 NOTE — ANESTHESIA PREPROCEDURE EVALUATION
Anesthesia Evaluation     Patient summary reviewed   NPO Solid Status: > 8 hours  NPO Liquid Status: > 8 hours           Airway   Comment: ETT  Dental      Pulmonary    (+) pneumonia , pulmonary embolism, recent URI (COVID),     ROS comment: Ventilator dependent  Cardiovascular     (+) hypertension,       Neuro/Psych  GI/Hepatic/Renal/Endo    (+)  GERD,      Musculoskeletal     Abdominal    Substance History      OB/GYN          Other                        Anesthesia Plan    ASA 4     general   (Chart reviewed, patient intubated and sedated)          CODE STATUS:    Level Of Support Discussed With: Patient  Code Status (Patient has no pulse and is not breathing): CPR (Attempt to Resuscitate)  Medical Interventions (Patient has pulse or is breathing): Full Support

## 2022-01-26 PROBLEM — E43 SEVERE MALNUTRITION: Status: ACTIVE | Noted: 2022-01-26

## 2022-01-26 LAB
ANION GAP SERPL CALCULATED.3IONS-SCNC: 10 MMOL/L (ref 5–15)
ARTERIAL PATENCY WRIST A: POSITIVE
ATMOSPHERIC PRESS: 762 MMHG
BASE EXCESS BLDA CALC-SCNC: 3.4 MMOL/L (ref 0–2)
BDY SITE: ABNORMAL
BODY TEMPERATURE: 37 C
BUN SERPL-MCNC: 17 MG/DL (ref 6–20)
BUN/CREAT SERPL: 40.5 (ref 7–25)
CALCIUM SPEC-SCNC: 8.4 MG/DL (ref 8.6–10.5)
CHLORIDE SERPL-SCNC: 100 MMOL/L (ref 98–107)
CO2 SERPL-SCNC: 27 MMOL/L (ref 22–29)
CREAT SERPL-MCNC: 0.42 MG/DL (ref 0.76–1.27)
DEPRECATED RDW RBC AUTO: 49.6 FL (ref 37–54)
ERYTHROCYTE [DISTWIDTH] IN BLOOD BY AUTOMATED COUNT: 17.1 % (ref 12.3–15.4)
GFR SERPL CREATININE-BSD FRML MDRD: >150 ML/MIN/1.73
GLUCOSE BLDC GLUCOMTR-MCNC: 104 MG/DL (ref 70–130)
GLUCOSE BLDC GLUCOMTR-MCNC: 108 MG/DL (ref 70–130)
GLUCOSE BLDC GLUCOMTR-MCNC: 112 MG/DL (ref 70–130)
GLUCOSE BLDC GLUCOMTR-MCNC: 96 MG/DL (ref 70–130)
GLUCOSE SERPL-MCNC: 109 MG/DL (ref 65–99)
HCO3 BLDA-SCNC: 26.7 MMOL/L (ref 20–26)
HCT VFR BLD AUTO: 42.9 % (ref 37.5–51)
HGB BLD-MCNC: 13.1 G/DL (ref 13–17.7)
INHALED O2 CONCENTRATION: 30 %
LIPASE SERPL-CCNC: 189 U/L (ref 13–60)
Lab: ABNORMAL
MCH RBC QN AUTO: 25.3 PG (ref 26.6–33)
MCHC RBC AUTO-ENTMCNC: 30.5 G/DL (ref 31.5–35.7)
MCV RBC AUTO: 83 FL (ref 79–97)
MODALITY: ABNORMAL
PAW @ PEAK INSP FLOW SETTING VENT: 17 CMH2O
PCO2 BLDA: 35.5 MM HG (ref 35–45)
PCO2 TEMP ADJ BLD: 35.5 MM HG (ref 35–45)
PEEP RESPIRATORY: 5 CM[H2O]
PH BLDA: 7.49 PH UNITS (ref 7.35–7.45)
PH, TEMP CORRECTED: 7.49 PH UNITS (ref 7.35–7.45)
PLATELET # BLD AUTO: 369 10*3/MM3 (ref 140–450)
PMV BLD AUTO: 11.8 FL (ref 6–12)
PO2 BLDA: 65.3 MM HG (ref 83–108)
PO2 TEMP ADJ BLD: 65.3 MM HG (ref 83–108)
POTASSIUM SERPL-SCNC: 4.3 MMOL/L (ref 3.5–5.2)
RBC # BLD AUTO: 5.17 10*6/MM3 (ref 4.14–5.8)
SAO2 % BLDCOA: 94.2 % (ref 94–99)
SET MECH RESP RATE: 18
SODIUM SERPL-SCNC: 137 MMOL/L (ref 136–145)
VENTILATOR MODE: ABNORMAL
WBC NRBC COR # BLD: 11.62 10*3/MM3 (ref 3.4–10.8)

## 2022-01-26 PROCEDURE — 25010000002 METOCLOPRAMIDE PER 10 MG: Performed by: INTERNAL MEDICINE

## 2022-01-26 PROCEDURE — 25010000002 PROPOFOL 10 MG/ML EMULSION: Performed by: INTERNAL MEDICINE

## 2022-01-26 PROCEDURE — 99232 SBSQ HOSP IP/OBS MODERATE 35: CPT | Performed by: OTOLARYNGOLOGY

## 2022-01-26 PROCEDURE — 25010000002 CEFTRIAXONE PER 250 MG: Performed by: INTERNAL MEDICINE

## 2022-01-26 PROCEDURE — 99231 SBSQ HOSP IP/OBS SF/LOW 25: CPT | Performed by: INTERNAL MEDICINE

## 2022-01-26 PROCEDURE — 82962 GLUCOSE BLOOD TEST: CPT

## 2022-01-26 PROCEDURE — 94799 UNLISTED PULMONARY SVC/PX: CPT

## 2022-01-26 PROCEDURE — 25010000002 LORAZEPAM PER 2 MG: Performed by: INTERNAL MEDICINE

## 2022-01-26 PROCEDURE — 82803 BLOOD GASES ANY COMBINATION: CPT

## 2022-01-26 PROCEDURE — 85027 COMPLETE CBC AUTOMATED: CPT | Performed by: INTERNAL MEDICINE

## 2022-01-26 PROCEDURE — 99233 SBSQ HOSP IP/OBS HIGH 50: CPT | Performed by: INTERNAL MEDICINE

## 2022-01-26 PROCEDURE — 94003 VENT MGMT INPAT SUBQ DAY: CPT

## 2022-01-26 PROCEDURE — 83690 ASSAY OF LIPASE: CPT | Performed by: INTERNAL MEDICINE

## 2022-01-26 PROCEDURE — 80048 BASIC METABOLIC PNL TOTAL CA: CPT | Performed by: INTERNAL MEDICINE

## 2022-01-26 PROCEDURE — 36600 WITHDRAWAL OF ARTERIAL BLOOD: CPT

## 2022-01-26 PROCEDURE — 25010000002 MORPHINE PER 10 MG: Performed by: INTERNAL MEDICINE

## 2022-01-26 RX ORDER — OXYCODONE HCL 5 MG/5 ML
5 SOLUTION, ORAL ORAL EVERY 6 HOURS PRN
Status: DISCONTINUED | OUTPATIENT
Start: 2022-01-26 | End: 2022-01-28

## 2022-01-26 RX ADMIN — DEXMEDETOMIDINE HYDROCHLORIDE 1.5 MCG/KG/HR: 4 INJECTION, SOLUTION INTRAVENOUS at 22:27

## 2022-01-26 RX ADMIN — POLYETHYLENE GLYCOL 3350 17 G: 17 POWDER, FOR SOLUTION ORAL at 08:49

## 2022-01-26 RX ADMIN — PROPOFOL 65 MCG/KG/MIN: 10 INJECTION, EMULSION INTRAVENOUS at 14:36

## 2022-01-26 RX ADMIN — PANTOPRAZOLE SODIUM 40 MG: 40 INJECTION, POWDER, FOR SOLUTION INTRAVENOUS at 20:04

## 2022-01-26 RX ADMIN — PROPOFOL 70 MCG/KG/MIN: 10 INJECTION, EMULSION INTRAVENOUS at 22:28

## 2022-01-26 RX ADMIN — LORAZEPAM 1 MG: 2 INJECTION INTRAMUSCULAR; INTRAVENOUS at 13:30

## 2022-01-26 RX ADMIN — BUDESONIDE AND FORMOTEROL FUMARATE DIHYDRATE 2 PUFF: 160; 4.5 AEROSOL RESPIRATORY (INHALATION) at 19:42

## 2022-01-26 RX ADMIN — ALBUTEROL SULFATE 6 PUFF: 90 AEROSOL, METERED RESPIRATORY (INHALATION) at 19:39

## 2022-01-26 RX ADMIN — THIAMINE HCL TAB 100 MG 100 MG: 100 TAB at 08:50

## 2022-01-26 RX ADMIN — PROPOFOL 65 MCG/KG/MIN: 10 INJECTION, EMULSION INTRAVENOUS at 12:08

## 2022-01-26 RX ADMIN — PROPOFOL 70 MCG/KG/MIN: 10 INJECTION, EMULSION INTRAVENOUS at 20:44

## 2022-01-26 RX ADMIN — MORPHINE SULFATE 3 MG: 4 INJECTION INTRAVENOUS at 08:33

## 2022-01-26 RX ADMIN — OXYCODONE HYDROCHLORIDE 5 MG: 5 SOLUTION ORAL at 08:49

## 2022-01-26 RX ADMIN — DEXMEDETOMIDINE HYDROCHLORIDE 1.5 MCG/KG/HR: 4 INJECTION, SOLUTION INTRAVENOUS at 07:53

## 2022-01-26 RX ADMIN — PROPOFOL 55 MCG/KG/MIN: 10 INJECTION, EMULSION INTRAVENOUS at 00:50

## 2022-01-26 RX ADMIN — PROPOFOL 65 MCG/KG/MIN: 10 INJECTION, EMULSION INTRAVENOUS at 07:54

## 2022-01-26 RX ADMIN — BUDESONIDE AND FORMOTEROL FUMARATE DIHYDRATE 2 PUFF: 160; 4.5 AEROSOL RESPIRATORY (INHALATION) at 08:15

## 2022-01-26 RX ADMIN — SODIUM CHLORIDE 1 G: 900 INJECTION INTRAVENOUS at 08:37

## 2022-01-26 RX ADMIN — DEXMEDETOMIDINE HYDROCHLORIDE 1.5 MCG/KG/HR: 4 INJECTION, SOLUTION INTRAVENOUS at 17:49

## 2022-01-26 RX ADMIN — DEXMEDETOMIDINE HYDROCHLORIDE 1.5 MCG/KG/HR: 4 INJECTION, SOLUTION INTRAVENOUS at 20:04

## 2022-01-26 RX ADMIN — ALBUTEROL SULFATE 6 PUFF: 90 AEROSOL, METERED RESPIRATORY (INHALATION) at 10:22

## 2022-01-26 RX ADMIN — LORAZEPAM 1 MG: 2 INJECTION INTRAMUSCULAR; INTRAVENOUS at 22:28

## 2022-01-26 RX ADMIN — ZINC SULFATE 220 MG (50 MG) CAPSULE 220 MG: CAPSULE at 08:50

## 2022-01-26 RX ADMIN — METOCLOPRAMIDE HYDROCHLORIDE 10 MG: 5 INJECTION INTRAMUSCULAR; INTRAVENOUS at 16:33

## 2022-01-26 RX ADMIN — PROPOFOL 65 MCG/KG/MIN: 10 INJECTION, EMULSION INTRAVENOUS at 18:53

## 2022-01-26 RX ADMIN — GUAIFENESIN 400 MG: 200 SOLUTION ORAL at 16:35

## 2022-01-26 RX ADMIN — DOCUSATE SODIUM 50 MG AND SENNOSIDES 8.6 MG 1 TABLET: 8.6; 5 TABLET, FILM COATED ORAL at 08:49

## 2022-01-26 RX ADMIN — DEXMEDETOMIDINE HYDROCHLORIDE 0.9 MCG/KG/HR: 4 INJECTION, SOLUTION INTRAVENOUS at 00:50

## 2022-01-26 RX ADMIN — METOCLOPRAMIDE HYDROCHLORIDE 10 MG: 5 INJECTION INTRAMUSCULAR; INTRAVENOUS at 20:04

## 2022-01-26 RX ADMIN — PANTOPRAZOLE SODIUM 40 MG: 40 INJECTION, POWDER, FOR SOLUTION INTRAVENOUS at 08:49

## 2022-01-26 RX ADMIN — DEXMEDETOMIDINE HYDROCHLORIDE 1.5 MCG/KG/HR: 4 INJECTION, SOLUTION INTRAVENOUS at 12:34

## 2022-01-26 RX ADMIN — OXYCODONE HYDROCHLORIDE AND ACETAMINOPHEN 500 MG: 500 TABLET ORAL at 08:49

## 2022-01-26 RX ADMIN — GUAIFENESIN 400 MG: 200 SOLUTION ORAL at 00:50

## 2022-01-26 RX ADMIN — Medication 1000 UNITS: at 08:49

## 2022-01-26 RX ADMIN — PROPOFOL 55 MCG/KG/MIN: 10 INJECTION, EMULSION INTRAVENOUS at 03:52

## 2022-01-26 RX ADMIN — PROPOFOL 65 MCG/KG/MIN: 10 INJECTION, EMULSION INTRAVENOUS at 16:37

## 2022-01-26 RX ADMIN — LORAZEPAM 1 MG: 2 INJECTION INTRAMUSCULAR; INTRAVENOUS at 06:07

## 2022-01-26 RX ADMIN — PROPOFOL 65 MCG/KG/MIN: 10 INJECTION, EMULSION INTRAVENOUS at 05:49

## 2022-01-26 RX ADMIN — ALBUTEROL SULFATE 6 PUFF: 90 AEROSOL, METERED RESPIRATORY (INHALATION) at 14:37

## 2022-01-26 RX ADMIN — ALBUTEROL SULFATE 6 PUFF: 90 AEROSOL, METERED RESPIRATORY (INHALATION) at 08:15

## 2022-01-26 RX ADMIN — ASCORBIC ACID, VITAMIN A PALMITATE, CHOLECALCIFEROL, THIAMINE HYDROCHLORIDE, RIBOFLAVIN-5 PHOSPHATE SODIUM, PYRIDOXINE HYDROCHLORIDE, NIACINAMIDE, DEXPANTHENOL, ALPHA-TOCOPHEROL ACETATE, VITAMIN K1, FOLIC ACID, BIOTIN, CYANOCOBALAMIN: 200; 3300; 200; 6; 3.6; 6; 40; 15; 10; 150; 600; 60; 5 INJECTION, SOLUTION INTRAVENOUS at 17:49

## 2022-01-26 RX ADMIN — METOCLOPRAMIDE HYDROCHLORIDE 10 MG: 5 INJECTION INTRAMUSCULAR; INTRAVENOUS at 07:08

## 2022-01-26 RX ADMIN — ATORVASTATIN CALCIUM 20 MG: 10 TABLET, FILM COATED ORAL at 20:04

## 2022-01-26 RX ADMIN — GUAIFENESIN 400 MG: 200 SOLUTION ORAL at 08:40

## 2022-01-26 RX ADMIN — DEXMEDETOMIDINE HYDROCHLORIDE 1.5 MCG/KG/HR: 4 INJECTION, SOLUTION INTRAVENOUS at 15:21

## 2022-01-26 RX ADMIN — MORPHINE SULFATE 3 MG: 4 INJECTION INTRAVENOUS at 04:16

## 2022-01-26 RX ADMIN — DOCUSATE SODIUM 50 MG AND SENNOSIDES 8.6 MG 1 TABLET: 8.6; 5 TABLET, FILM COATED ORAL at 20:04

## 2022-01-26 RX ADMIN — DEXMEDETOMIDINE HYDROCHLORIDE 1.5 MCG/KG/HR: 4 INJECTION, SOLUTION INTRAVENOUS at 10:03

## 2022-01-26 RX ADMIN — ACETAMINOPHEN 649.6 MG: 160 SOLUTION ORAL at 08:49

## 2022-01-26 RX ADMIN — METOCLOPRAMIDE HYDROCHLORIDE 10 MG: 5 INJECTION INTRAMUSCULAR; INTRAVENOUS at 13:30

## 2022-01-26 RX ADMIN — PROPOFOL 65 MCG/KG/MIN: 10 INJECTION, EMULSION INTRAVENOUS at 10:04

## 2022-01-26 NOTE — ANESTHESIA POSTPROCEDURE EVALUATION
"Patient: Eddy Ferro    Procedure Summary     Date: 01/25/22 Room / Location:  [facility] 03 /  [facility]    Anesthesia Start: 1829 Anesthesia Stop: 1916    Procedures:       tracheostomy (N/A Neck)      laryngoscopy (N/A ) Diagnosis:       Ventilator dependent (HCC)      Acute respiratory distress syndrome (ARDS) due to 2019 novel coronavirus (HCC)      Pneumonia due to COVID-19 virus      Acute pulmonary embolism without acute cor pulmonale, unspecified pulmonary embolism type (HCC)      (Ventilator dependent (HCC) [Z99.11])      (Acute respiratory distress syndrome (ARDS) due to 2019 novel coronavirus (HCC) [U07.1, J80])      (Pneumonia due to COVID-19 virus [U07.1, J12.82])      (Acute pulmonary embolism without acute cor pulmonale, unspecified pulmonary embolism type (HCC) [I26.99])    Surgeons: Liam Bergman Jr., MD Provider: Shante Torres CRNA    Anesthesia Type: general ASA Status: 4          Anesthesia Type: general    Vitals  No vitals data found for the desired time range.          Post Anesthesia Care and Evaluation    Patient location during evaluation: ICU  Pain management: adequate  Airway patency: patent  Anesthetic complications: No anesthetic complications    Cardiovascular status: acceptable  Respiratory status: acceptable  Hydration status: acceptable    Comments: Blood pressure 111/67, pulse 64, temperature 97.8 °F (36.6 °C), temperature source Axillary, resp. rate 22, height 177.8 cm (70\"), weight 93.3 kg (205 lb 11 oz), SpO2 98 %.    No anesthesia care post op    "

## 2022-01-27 LAB
ANION GAP SERPL CALCULATED.3IONS-SCNC: 8 MMOL/L (ref 5–15)
ARTERIAL PATENCY WRIST A: POSITIVE
ATMOSPHERIC PRESS: 758 MMHG
BASE EXCESS BLDA CALC-SCNC: 2 MMOL/L (ref 0–2)
BASOPHILS # BLD AUTO: 0.06 10*3/MM3 (ref 0–0.2)
BASOPHILS NFR BLD AUTO: 0.5 % (ref 0–1.5)
BDY SITE: ABNORMAL
BODY TEMPERATURE: 37 C
BUN SERPL-MCNC: 14 MG/DL (ref 6–20)
BUN/CREAT SERPL: 35 (ref 7–25)
CALCIUM SPEC-SCNC: 8 MG/DL (ref 8.6–10.5)
CHLORIDE SERPL-SCNC: 102 MMOL/L (ref 98–107)
CO2 SERPL-SCNC: 26 MMOL/L (ref 22–29)
CREAT SERPL-MCNC: 0.4 MG/DL (ref 0.76–1.27)
DEPRECATED RDW RBC AUTO: 50.3 FL (ref 37–54)
EOSINOPHIL # BLD AUTO: 0.93 10*3/MM3 (ref 0–0.4)
EOSINOPHIL NFR BLD AUTO: 8.5 % (ref 0.3–6.2)
ERYTHROCYTE [DISTWIDTH] IN BLOOD BY AUTOMATED COUNT: 17.2 % (ref 12.3–15.4)
GFR SERPL CREATININE-BSD FRML MDRD: >150 ML/MIN/1.73
GLUCOSE BLDC GLUCOMTR-MCNC: 115 MG/DL (ref 70–130)
GLUCOSE BLDC GLUCOMTR-MCNC: 121 MG/DL (ref 70–130)
GLUCOSE BLDC GLUCOMTR-MCNC: 122 MG/DL (ref 70–130)
GLUCOSE BLDC GLUCOMTR-MCNC: 127 MG/DL (ref 70–130)
GLUCOSE SERPL-MCNC: 119 MG/DL (ref 65–99)
HCO3 BLDA-SCNC: 28.4 MMOL/L (ref 20–26)
HCT VFR BLD AUTO: 35.7 % (ref 37.5–51)
HGB BLD-MCNC: 10.6 G/DL (ref 13–17.7)
IMM GRANULOCYTES # BLD AUTO: 0.08 10*3/MM3 (ref 0–0.05)
IMM GRANULOCYTES NFR BLD AUTO: 0.7 % (ref 0–0.5)
INHALED O2 CONCENTRATION: 30 %
INR PPP: 1.16 (ref 0.91–1.09)
LYMPHOCYTES # BLD AUTO: 1.54 10*3/MM3 (ref 0.7–3.1)
LYMPHOCYTES NFR BLD AUTO: 14.1 % (ref 19.6–45.3)
Lab: ABNORMAL
MAGNESIUM SERPL-MCNC: 1.7 MG/DL (ref 1.6–2.6)
MCH RBC QN AUTO: 24.5 PG (ref 26.6–33)
MCHC RBC AUTO-ENTMCNC: 29.7 G/DL (ref 31.5–35.7)
MCV RBC AUTO: 82.4 FL (ref 79–97)
MODALITY: ABNORMAL
MONOCYTES # BLD AUTO: 0.98 10*3/MM3 (ref 0.1–0.9)
MONOCYTES NFR BLD AUTO: 8.9 % (ref 5–12)
NEUTROPHILS NFR BLD AUTO: 67.3 % (ref 42.7–76)
NEUTROPHILS NFR BLD AUTO: 7.36 10*3/MM3 (ref 1.7–7)
NRBC BLD AUTO-RTO: 0 /100 WBC (ref 0–0.2)
PAW @ PEAK INSP FLOW SETTING VENT: 17 CMH2O
PCO2 BLDA: 51.6 MM HG (ref 35–45)
PCO2 TEMP ADJ BLD: 51.6 MM HG (ref 35–45)
PEEP RESPIRATORY: 5 CM[H2O]
PH BLDA: 7.35 PH UNITS (ref 7.35–7.45)
PH, TEMP CORRECTED: 7.35 PH UNITS (ref 7.35–7.45)
PHOSPHATE SERPL-MCNC: 4.5 MG/DL (ref 2.5–4.5)
PLATELET # BLD AUTO: 310 10*3/MM3 (ref 140–450)
PMV BLD AUTO: 11.8 FL (ref 6–12)
PO2 BLDA: 71.4 MM HG (ref 83–108)
PO2 TEMP ADJ BLD: 71.4 MM HG (ref 83–108)
POTASSIUM SERPL-SCNC: 3.8 MMOL/L (ref 3.5–5.2)
PROTHROMBIN TIME: 14.4 SECONDS (ref 11.9–14.6)
RBC # BLD AUTO: 4.33 10*6/MM3 (ref 4.14–5.8)
SAO2 % BLDCOA: 93.3 % (ref 94–99)
SET MECH RESP RATE: 18
SODIUM SERPL-SCNC: 136 MMOL/L (ref 136–145)
VENTILATOR MODE: AC
WBC NRBC COR # BLD: 10.95 10*3/MM3 (ref 3.4–10.8)

## 2022-01-27 PROCEDURE — C1769 GUIDE WIRE: HCPCS | Performed by: INTERNAL MEDICINE

## 2022-01-27 PROCEDURE — 94003 VENT MGMT INPAT SUBQ DAY: CPT

## 2022-01-27 PROCEDURE — 94799 UNLISTED PULMONARY SVC/PX: CPT

## 2022-01-27 PROCEDURE — 99233 SBSQ HOSP IP/OBS HIGH 50: CPT | Performed by: INTERNAL MEDICINE

## 2022-01-27 PROCEDURE — 85025 COMPLETE CBC W/AUTO DIFF WBC: CPT | Performed by: INTERNAL MEDICINE

## 2022-01-27 PROCEDURE — 85610 PROTHROMBIN TIME: CPT | Performed by: INTERNAL MEDICINE

## 2022-01-27 PROCEDURE — 80048 BASIC METABOLIC PNL TOTAL CA: CPT | Performed by: INTERNAL MEDICINE

## 2022-01-27 PROCEDURE — 25010000002 MORPHINE PER 10 MG: Performed by: INTERNAL MEDICINE

## 2022-01-27 PROCEDURE — 3E0G76Z INTRODUCTION OF NUTRITIONAL SUBSTANCE INTO UPPER GI, VIA NATURAL OR ARTIFICIAL OPENING: ICD-10-PCS | Performed by: INTERNAL MEDICINE

## 2022-01-27 PROCEDURE — 0DH63UZ INSERTION OF FEEDING DEVICE INTO STOMACH, PERCUTANEOUS APPROACH: ICD-10-PCS | Performed by: INTERNAL MEDICINE

## 2022-01-27 PROCEDURE — 84100 ASSAY OF PHOSPHORUS: CPT | Performed by: INTERNAL MEDICINE

## 2022-01-27 PROCEDURE — 25010000002 CEFTRIAXONE PER 250 MG: Performed by: INTERNAL MEDICINE

## 2022-01-27 PROCEDURE — 43246 EGD PLACE GASTROSTOMY TUBE: CPT | Performed by: INTERNAL MEDICINE

## 2022-01-27 PROCEDURE — 83735 ASSAY OF MAGNESIUM: CPT | Performed by: INTERNAL MEDICINE

## 2022-01-27 PROCEDURE — 25010000002 LORAZEPAM PER 2 MG: Performed by: INTERNAL MEDICINE

## 2022-01-27 PROCEDURE — 25010000002 METOCLOPRAMIDE PER 10 MG: Performed by: INTERNAL MEDICINE

## 2022-01-27 PROCEDURE — 99232 SBSQ HOSP IP/OBS MODERATE 35: CPT | Performed by: OTOLARYNGOLOGY

## 2022-01-27 PROCEDURE — 82803 BLOOD GASES ANY COMBINATION: CPT

## 2022-01-27 PROCEDURE — 25010000002 PROPOFOL 10 MG/ML EMULSION: Performed by: INTERNAL MEDICINE

## 2022-01-27 PROCEDURE — 82962 GLUCOSE BLOOD TEST: CPT

## 2022-01-27 PROCEDURE — 36600 WITHDRAWAL OF ARTERIAL BLOOD: CPT

## 2022-01-27 RX ORDER — METOPROLOL TARTRATE 5 MG/5ML
2.5 INJECTION INTRAVENOUS EVERY 6 HOURS SCHEDULED
Status: DISCONTINUED | OUTPATIENT
Start: 2022-01-27 | End: 2022-02-02

## 2022-01-27 RX ORDER — TERAZOSIN 1 MG/1
1 CAPSULE ORAL NIGHTLY
Status: DISCONTINUED | OUTPATIENT
Start: 2022-01-27 | End: 2022-01-28

## 2022-01-27 RX ADMIN — THIAMINE HCL TAB 100 MG 100 MG: 100 TAB at 09:06

## 2022-01-27 RX ADMIN — PROPOFOL 75 MCG/KG/MIN: 10 INJECTION, EMULSION INTRAVENOUS at 16:03

## 2022-01-27 RX ADMIN — DEXMEDETOMIDINE HYDROCHLORIDE 1.5 MCG/KG/HR: 4 INJECTION, SOLUTION INTRAVENOUS at 14:32

## 2022-01-27 RX ADMIN — OXYCODONE HYDROCHLORIDE AND ACETAMINOPHEN 500 MG: 500 TABLET ORAL at 09:08

## 2022-01-27 RX ADMIN — SODIUM CHLORIDE 1 G: 900 INJECTION INTRAVENOUS at 09:06

## 2022-01-27 RX ADMIN — METOCLOPRAMIDE HYDROCHLORIDE 10 MG: 5 INJECTION INTRAMUSCULAR; INTRAVENOUS at 17:07

## 2022-01-27 RX ADMIN — BUDESONIDE AND FORMOTEROL FUMARATE DIHYDRATE 2 PUFF: 160; 4.5 AEROSOL RESPIRATORY (INHALATION) at 19:53

## 2022-01-27 RX ADMIN — PROPOFOL 70 MCG/KG/MIN: 10 INJECTION, EMULSION INTRAVENOUS at 23:46

## 2022-01-27 RX ADMIN — LORAZEPAM 1 MG: 2 INJECTION INTRAMUSCULAR; INTRAVENOUS at 13:29

## 2022-01-27 RX ADMIN — PROPOFOL 70 MCG/KG/MIN: 10 INJECTION, EMULSION INTRAVENOUS at 06:32

## 2022-01-27 RX ADMIN — DEXMEDETOMIDINE HYDROCHLORIDE 1.5 MCG/KG/HR: 4 INJECTION, SOLUTION INTRAVENOUS at 05:48

## 2022-01-27 RX ADMIN — PANTOPRAZOLE SODIUM 40 MG: 40 INJECTION, POWDER, FOR SOLUTION INTRAVENOUS at 21:11

## 2022-01-27 RX ADMIN — METOPROLOL TARTRATE 2.5 MG: 1 INJECTION, SOLUTION INTRAVENOUS at 23:50

## 2022-01-27 RX ADMIN — ALBUTEROL SULFATE 6 PUFF: 90 AEROSOL, METERED RESPIRATORY (INHALATION) at 10:43

## 2022-01-27 RX ADMIN — PROPOFOL 70 MCG/KG/MIN: 10 INJECTION, EMULSION INTRAVENOUS at 04:29

## 2022-01-27 RX ADMIN — GUAIFENESIN 400 MG: 200 SOLUTION ORAL at 09:06

## 2022-01-27 RX ADMIN — DEXMEDETOMIDINE HYDROCHLORIDE 1.5 MCG/KG/HR: 4 INJECTION, SOLUTION INTRAVENOUS at 22:49

## 2022-01-27 RX ADMIN — LORAZEPAM 1 MG: 2 INJECTION INTRAMUSCULAR; INTRAVENOUS at 21:27

## 2022-01-27 RX ADMIN — DEXMEDETOMIDINE HYDROCHLORIDE 1.5 MCG/KG/HR: 4 INJECTION, SOLUTION INTRAVENOUS at 19:59

## 2022-01-27 RX ADMIN — LEUCINE, PHENYLALANINE, LYSINE, METHIONINE, ISOLEUCINE, VALINE, HISTIDINE, THREONINE, TRYPTOPHAN, ALANINE, GLYCINE, ARGININE, PROLINE, SERINE, TYROSINE, SODIUM ACETATE, DIBASIC POTASSIUM PHOSPHATE, MAGNESIUM CHLORIDE, SODIUM CHLORIDE, CALCIUM CHLORIDE, DEXTROSE
365; 280; 290; 200; 300; 290; 240; 210; 90; 1035; 515; 575; 340; 250; 20; 340; 261; 51; 59; 33; 15 INJECTION INTRAVENOUS at 17:07

## 2022-01-27 RX ADMIN — PROPOFOL 70 MCG/KG/MIN: 10 INJECTION, EMULSION INTRAVENOUS at 13:29

## 2022-01-27 RX ADMIN — DEXMEDETOMIDINE HYDROCHLORIDE 1.5 MCG/KG/HR: 4 INJECTION, SOLUTION INTRAVENOUS at 17:06

## 2022-01-27 RX ADMIN — PROPOFOL 75 MCG/KG/MIN: 10 INJECTION, EMULSION INTRAVENOUS at 18:29

## 2022-01-27 RX ADMIN — ALBUTEROL SULFATE 6 PUFF: 90 AEROSOL, METERED RESPIRATORY (INHALATION) at 07:09

## 2022-01-27 RX ADMIN — ZINC SULFATE 220 MG (50 MG) CAPSULE 220 MG: CAPSULE at 09:06

## 2022-01-27 RX ADMIN — METOCLOPRAMIDE HYDROCHLORIDE 10 MG: 5 INJECTION INTRAMUSCULAR; INTRAVENOUS at 21:25

## 2022-01-27 RX ADMIN — TERAZOSIN HYDROCHLORIDE 1 MG: 1 CAPSULE ORAL at 10:42

## 2022-01-27 RX ADMIN — DEXMEDETOMIDINE HYDROCHLORIDE 1.5 MCG/KG/HR: 4 INJECTION, SOLUTION INTRAVENOUS at 08:25

## 2022-01-27 RX ADMIN — METOCLOPRAMIDE HYDROCHLORIDE 10 MG: 5 INJECTION INTRAMUSCULAR; INTRAVENOUS at 10:42

## 2022-01-27 RX ADMIN — ALBUTEROL SULFATE 6 PUFF: 90 AEROSOL, METERED RESPIRATORY (INHALATION) at 15:24

## 2022-01-27 RX ADMIN — GUAIFENESIN 400 MG: 200 SOLUTION ORAL at 01:42

## 2022-01-27 RX ADMIN — Medication 1000 UNITS: at 09:06

## 2022-01-27 RX ADMIN — PROPOFOL 70 MCG/KG/MIN: 10 INJECTION, EMULSION INTRAVENOUS at 21:05

## 2022-01-27 RX ADMIN — MORPHINE SULFATE 3 MG: 4 INJECTION INTRAVENOUS at 02:22

## 2022-01-27 RX ADMIN — PROPOFOL 70 MCG/KG/MIN: 10 INJECTION, EMULSION INTRAVENOUS at 00:31

## 2022-01-27 RX ADMIN — PROPOFOL 65 MCG/KG/MIN: 10 INJECTION, EMULSION INTRAVENOUS at 10:59

## 2022-01-27 RX ADMIN — MORPHINE SULFATE 3 MG: 4 INJECTION INTRAVENOUS at 15:23

## 2022-01-27 RX ADMIN — DEXMEDETOMIDINE HYDROCHLORIDE 1.5 MCG/KG/HR: 4 INJECTION, SOLUTION INTRAVENOUS at 03:29

## 2022-01-27 RX ADMIN — SODIUM CHLORIDE, PRESERVATIVE FREE 10 ML: 5 INJECTION INTRAVENOUS at 21:24

## 2022-01-27 RX ADMIN — PANTOPRAZOLE SODIUM 40 MG: 40 INJECTION, POWDER, FOR SOLUTION INTRAVENOUS at 09:06

## 2022-01-27 RX ADMIN — METOCLOPRAMIDE HYDROCHLORIDE 10 MG: 5 INJECTION INTRAMUSCULAR; INTRAVENOUS at 06:32

## 2022-01-27 RX ADMIN — BUDESONIDE AND FORMOTEROL FUMARATE DIHYDRATE 2 PUFF: 160; 4.5 AEROSOL RESPIRATORY (INHALATION) at 07:09

## 2022-01-27 RX ADMIN — PROPOFOL 70 MCG/KG/MIN: 10 INJECTION, EMULSION INTRAVENOUS at 02:22

## 2022-01-27 RX ADMIN — DEXMEDETOMIDINE HYDROCHLORIDE 1.5 MCG/KG/HR: 4 INJECTION, SOLUTION INTRAVENOUS at 10:59

## 2022-01-27 RX ADMIN — MORPHINE SULFATE 3 MG: 4 INJECTION INTRAVENOUS at 11:02

## 2022-01-27 RX ADMIN — DOCUSATE SODIUM 50 MG AND SENNOSIDES 8.6 MG 1 TABLET: 8.6; 5 TABLET, FILM COATED ORAL at 09:08

## 2022-01-27 RX ADMIN — LORAZEPAM 1 MG: 2 INJECTION INTRAMUSCULAR; INTRAVENOUS at 05:42

## 2022-01-27 RX ADMIN — PROPOFOL 70 MCG/KG/MIN: 10 INJECTION, EMULSION INTRAVENOUS at 08:42

## 2022-01-27 RX ADMIN — DEXMEDETOMIDINE HYDROCHLORIDE 1.5 MCG/KG/HR: 4 INJECTION, SOLUTION INTRAVENOUS at 00:31

## 2022-01-27 RX ADMIN — METOPROLOL TARTRATE 2.5 MG: 1 INJECTION, SOLUTION INTRAVENOUS at 17:07

## 2022-01-27 RX ADMIN — ALBUTEROL SULFATE 6 PUFF: 90 AEROSOL, METERED RESPIRATORY (INHALATION) at 19:49

## 2022-01-28 LAB
ANION GAP SERPL CALCULATED.3IONS-SCNC: 5 MMOL/L (ref 5–15)
ARTERIAL PATENCY WRIST A: POSITIVE
ARTERIAL PATENCY WRIST A: POSITIVE
ATMOSPHERIC PRESS: 756 MMHG
ATMOSPHERIC PRESS: 756 MMHG
BASE EXCESS BLDA CALC-SCNC: 5.5 MMOL/L (ref 0–2)
BASE EXCESS BLDA CALC-SCNC: 6.7 MMOL/L (ref 0–2)
BDY SITE: ABNORMAL
BDY SITE: ABNORMAL
BODY TEMPERATURE: 37 C
BODY TEMPERATURE: 37 C
BUN SERPL-MCNC: 13 MG/DL (ref 6–20)
BUN/CREAT SERPL: 33.3 (ref 7–25)
CALCIUM SPEC-SCNC: 8.4 MG/DL (ref 8.6–10.5)
CHLORIDE SERPL-SCNC: 101 MMOL/L (ref 98–107)
CO2 SERPL-SCNC: 31 MMOL/L (ref 22–29)
CREAT SERPL-MCNC: 0.39 MG/DL (ref 0.76–1.27)
DEPRECATED RDW RBC AUTO: 50.2 FL (ref 37–54)
ERYTHROCYTE [DISTWIDTH] IN BLOOD BY AUTOMATED COUNT: 17.5 % (ref 12.3–15.4)
GFR SERPL CREATININE-BSD FRML MDRD: >150 ML/MIN/1.73
GLUCOSE BLDC GLUCOMTR-MCNC: 116 MG/DL (ref 70–130)
GLUCOSE BLDC GLUCOMTR-MCNC: 126 MG/DL (ref 70–130)
GLUCOSE SERPL-MCNC: 123 MG/DL (ref 65–99)
HCO3 BLDA-SCNC: 31.1 MMOL/L (ref 20–26)
HCO3 BLDA-SCNC: 31.9 MMOL/L (ref 20–26)
HCT VFR BLD AUTO: 35.3 % (ref 37.5–51)
HGB BLD-MCNC: 10.9 G/DL (ref 13–17.7)
INHALED O2 CONCENTRATION: 30 %
INHALED O2 CONCENTRATION: 30 %
Lab: ABNORMAL
Lab: ABNORMAL
MCH RBC QN AUTO: 25.5 PG (ref 26.6–33)
MCHC RBC AUTO-ENTMCNC: 30.9 G/DL (ref 31.5–35.7)
MCV RBC AUTO: 82.5 FL (ref 79–97)
MODALITY: ABNORMAL
MODALITY: ABNORMAL
PAW @ PEAK INSP FLOW SETTING VENT: 17 CMH2O
PCO2 BLDA: 47.3 MM HG (ref 35–45)
PCO2 BLDA: 49.1 MM HG (ref 35–45)
PCO2 TEMP ADJ BLD: 47.3 MM HG (ref 35–45)
PCO2 TEMP ADJ BLD: 49.1 MM HG (ref 35–45)
PEEP RESPIRATORY: 5 CM[H2O]
PEEP RESPIRATORY: 5 CM[H2O]
PH BLDA: 7.41 PH UNITS (ref 7.35–7.45)
PH BLDA: 7.44 PH UNITS (ref 7.35–7.45)
PH, TEMP CORRECTED: 7.41 PH UNITS (ref 7.35–7.45)
PH, TEMP CORRECTED: 7.44 PH UNITS (ref 7.35–7.45)
PLATELET # BLD AUTO: 268 10*3/MM3 (ref 140–450)
PMV BLD AUTO: 11.3 FL (ref 6–12)
PO2 BLDA: 66.8 MM HG (ref 83–108)
PO2 BLDA: 78.6 MM HG (ref 83–108)
PO2 TEMP ADJ BLD: 66.8 MM HG (ref 83–108)
PO2 TEMP ADJ BLD: 78.6 MM HG (ref 83–108)
POTASSIUM SERPL-SCNC: 3.9 MMOL/L (ref 3.5–5.2)
RBC # BLD AUTO: 4.28 10*6/MM3 (ref 4.14–5.8)
SAO2 % BLDCOA: 92.9 % (ref 94–99)
SAO2 % BLDCOA: 96.4 % (ref 94–99)
SET MECH RESP RATE: 18
SET MECH RESP RATE: 36
SODIUM SERPL-SCNC: 137 MMOL/L (ref 136–145)
VENTILATOR MODE: ABNORMAL
VENTILATOR MODE: AC
VT ON VENT VENT: 500 ML
WBC NRBC COR # BLD: 10.94 10*3/MM3 (ref 3.4–10.8)

## 2022-01-28 PROCEDURE — 25010000002 LORAZEPAM PER 2 MG: Performed by: INTERNAL MEDICINE

## 2022-01-28 PROCEDURE — 85027 COMPLETE CBC AUTOMATED: CPT | Performed by: INTERNAL MEDICINE

## 2022-01-28 PROCEDURE — 94799 UNLISTED PULMONARY SVC/PX: CPT

## 2022-01-28 PROCEDURE — 82803 BLOOD GASES ANY COMBINATION: CPT

## 2022-01-28 PROCEDURE — 99291 CRITICAL CARE FIRST HOUR: CPT | Performed by: INTERNAL MEDICINE

## 2022-01-28 PROCEDURE — 36600 WITHDRAWAL OF ARTERIAL BLOOD: CPT

## 2022-01-28 PROCEDURE — 94003 VENT MGMT INPAT SUBQ DAY: CPT

## 2022-01-28 PROCEDURE — 93005 ELECTROCARDIOGRAM TRACING: CPT | Performed by: INTERNAL MEDICINE

## 2022-01-28 PROCEDURE — 82962 GLUCOSE BLOOD TEST: CPT

## 2022-01-28 PROCEDURE — 25010000002 PROPOFOL 10 MG/ML EMULSION: Performed by: INTERNAL MEDICINE

## 2022-01-28 PROCEDURE — 25010000002 MAGNESIUM SULFATE IN D5W 1G/100ML (PREMIX) 1-5 GM/100ML-% SOLUTION: Performed by: INTERNAL MEDICINE

## 2022-01-28 PROCEDURE — 25010000002 METOCLOPRAMIDE PER 10 MG: Performed by: INTERNAL MEDICINE

## 2022-01-28 PROCEDURE — 80048 BASIC METABOLIC PNL TOTAL CA: CPT | Performed by: INTERNAL MEDICINE

## 2022-01-28 PROCEDURE — 25010000002 CEFTRIAXONE PER 250 MG: Performed by: INTERNAL MEDICINE

## 2022-01-28 PROCEDURE — 25010000002 MORPHINE SULFATE (PF) 2 MG/ML SOLUTION: Performed by: INTERNAL MEDICINE

## 2022-01-28 PROCEDURE — 93010 ELECTROCARDIOGRAM REPORT: CPT | Performed by: INTERNAL MEDICINE

## 2022-01-28 RX ORDER — OXYCODONE HCL 5 MG/5 ML
5 SOLUTION, ORAL ORAL EVERY 6 HOURS
Status: DISCONTINUED | OUTPATIENT
Start: 2022-01-28 | End: 2022-02-09 | Stop reason: HOSPADM

## 2022-01-28 RX ORDER — ATORVASTATIN CALCIUM 10 MG/1
20 TABLET, FILM COATED ORAL NIGHTLY
Status: DISCONTINUED | OUTPATIENT
Start: 2022-01-28 | End: 2022-02-09 | Stop reason: HOSPADM

## 2022-01-28 RX ORDER — AMOXICILLIN 250 MG
1 CAPSULE ORAL 2 TIMES DAILY
Status: DISCONTINUED | OUTPATIENT
Start: 2022-01-28 | End: 2022-02-09 | Stop reason: HOSPADM

## 2022-01-28 RX ORDER — MAGNESIUM SULFATE 1 G/100ML
1 INJECTION INTRAVENOUS ONCE
Status: COMPLETED | OUTPATIENT
Start: 2022-01-28 | End: 2022-01-28

## 2022-01-28 RX ORDER — ACETAMINOPHEN 160 MG/5ML
650 SOLUTION ORAL EVERY 6 HOURS PRN
Status: DISCONTINUED | OUTPATIENT
Start: 2022-01-28 | End: 2022-02-09 | Stop reason: HOSPADM

## 2022-01-28 RX ORDER — MORPHINE SULFATE 2 MG/ML
2 INJECTION, SOLUTION INTRAMUSCULAR; INTRAVENOUS EVERY 4 HOURS PRN
Status: DISPENSED | OUTPATIENT
Start: 2022-01-28 | End: 2022-01-28

## 2022-01-28 RX ORDER — DEXTROMETHORPHAN POLISTIREX 30 MG/5ML
60 SUSPENSION ORAL EVERY 12 HOURS PRN
Status: DISCONTINUED | OUTPATIENT
Start: 2022-01-28 | End: 2022-02-09 | Stop reason: HOSPADM

## 2022-01-28 RX ORDER — TERAZOSIN 1 MG/1
1 CAPSULE ORAL NIGHTLY
Status: DISCONTINUED | OUTPATIENT
Start: 2022-01-28 | End: 2022-02-09 | Stop reason: HOSPADM

## 2022-01-28 RX ORDER — ASCORBIC ACID 500 MG
500 TABLET ORAL DAILY
Status: DISCONTINUED | OUTPATIENT
Start: 2022-01-29 | End: 2022-02-05

## 2022-01-28 RX ORDER — OXYCODONE HCL 5 MG/5 ML
5 SOLUTION, ORAL ORAL EVERY 6 HOURS
Status: DISCONTINUED | OUTPATIENT
Start: 2022-01-28 | End: 2022-01-28

## 2022-01-28 RX ORDER — MELATONIN
1000 DAILY
Status: DISCONTINUED | OUTPATIENT
Start: 2022-01-29 | End: 2022-02-05

## 2022-01-28 RX ORDER — POLYETHYLENE GLYCOL 3350 17 G/17G
17 POWDER, FOR SOLUTION ORAL DAILY
Status: DISCONTINUED | OUTPATIENT
Start: 2022-01-29 | End: 2022-02-09 | Stop reason: HOSPADM

## 2022-01-28 RX ORDER — ZINC SULFATE 50(220)MG
220 CAPSULE ORAL DAILY
Status: DISCONTINUED | OUTPATIENT
Start: 2022-01-29 | End: 2022-02-05

## 2022-01-28 RX ORDER — LORAZEPAM 2 MG/ML
1 INJECTION INTRAMUSCULAR ONCE
Status: COMPLETED | OUTPATIENT
Start: 2022-01-28 | End: 2022-01-28

## 2022-01-28 RX ADMIN — PROPOFOL 75 MCG/KG/MIN: 10 INJECTION, EMULSION INTRAVENOUS at 11:58

## 2022-01-28 RX ADMIN — METOCLOPRAMIDE HYDROCHLORIDE 10 MG: 5 INJECTION INTRAMUSCULAR; INTRAVENOUS at 16:34

## 2022-01-28 RX ADMIN — ALBUTEROL SULFATE 6 PUFF: 90 AEROSOL, METERED RESPIRATORY (INHALATION) at 11:23

## 2022-01-28 RX ADMIN — GUAIFENESIN 400 MG: 100 SOLUTION ORAL at 17:14

## 2022-01-28 RX ADMIN — LORAZEPAM 1 MG: 2 INJECTION INTRAMUSCULAR; INTRAVENOUS at 09:39

## 2022-01-28 RX ADMIN — PROPOFOL 70 MCG/KG/MIN: 10 INJECTION, EMULSION INTRAVENOUS at 04:16

## 2022-01-28 RX ADMIN — LORAZEPAM 1 MG: 2 INJECTION INTRAMUSCULAR; INTRAVENOUS at 05:59

## 2022-01-28 RX ADMIN — PROPOFOL 70 MCG/KG/MIN: 10 INJECTION, EMULSION INTRAVENOUS at 01:33

## 2022-01-28 RX ADMIN — METOCLOPRAMIDE HYDROCHLORIDE 10 MG: 5 INJECTION INTRAMUSCULAR; INTRAVENOUS at 14:12

## 2022-01-28 RX ADMIN — PANTOPRAZOLE SODIUM 40 MG: 40 INJECTION, POWDER, FOR SOLUTION INTRAVENOUS at 20:49

## 2022-01-28 RX ADMIN — MORPHINE SULFATE 2 MG: 2 INJECTION, SOLUTION INTRAMUSCULAR; INTRAVENOUS at 09:19

## 2022-01-28 RX ADMIN — METOPROLOL TARTRATE 2.5 MG: 1 INJECTION, SOLUTION INTRAVENOUS at 23:54

## 2022-01-28 RX ADMIN — SODIUM CHLORIDE, PRESERVATIVE FREE 10 ML: 5 INJECTION INTRAVENOUS at 20:59

## 2022-01-28 RX ADMIN — METOCLOPRAMIDE HYDROCHLORIDE 10 MG: 5 INJECTION INTRAMUSCULAR; INTRAVENOUS at 08:55

## 2022-01-28 RX ADMIN — OXYCODONE HYDROCHLORIDE 5 MG: 5 SOLUTION ORAL at 17:14

## 2022-01-28 RX ADMIN — BUDESONIDE AND FORMOTEROL FUMARATE DIHYDRATE 2 PUFF: 160; 4.5 AEROSOL RESPIRATORY (INHALATION) at 18:28

## 2022-01-28 RX ADMIN — BUDESONIDE AND FORMOTEROL FUMARATE DIHYDRATE 2 PUFF: 160; 4.5 AEROSOL RESPIRATORY (INHALATION) at 07:22

## 2022-01-28 RX ADMIN — PROPOFOL 75 MCG/KG/MIN: 10 INJECTION, EMULSION INTRAVENOUS at 14:31

## 2022-01-28 RX ADMIN — DEXMEDETOMIDINE HYDROCHLORIDE 1.5 MCG/KG/HR: 4 INJECTION, SOLUTION INTRAVENOUS at 14:32

## 2022-01-28 RX ADMIN — METOCLOPRAMIDE HYDROCHLORIDE 10 MG: 5 INJECTION INTRAMUSCULAR; INTRAVENOUS at 20:49

## 2022-01-28 RX ADMIN — LORAZEPAM 1 MG: 2 INJECTION INTRAMUSCULAR; INTRAVENOUS at 22:41

## 2022-01-28 RX ADMIN — PROPOFOL 75 MCG/KG/MIN: 10 INJECTION, EMULSION INTRAVENOUS at 23:46

## 2022-01-28 RX ADMIN — ALBUTEROL SULFATE 6 PUFF: 90 AEROSOL, METERED RESPIRATORY (INHALATION) at 14:16

## 2022-01-28 RX ADMIN — DEXMEDETOMIDINE HYDROCHLORIDE 1.5 MCG/KG/HR: 4 INJECTION, SOLUTION INTRAVENOUS at 04:16

## 2022-01-28 RX ADMIN — METOPROLOL TARTRATE 2.5 MG: 1 INJECTION, SOLUTION INTRAVENOUS at 05:54

## 2022-01-28 RX ADMIN — PROPOFOL 70 MCG/KG/MIN: 10 INJECTION, EMULSION INTRAVENOUS at 09:01

## 2022-01-28 RX ADMIN — ASCORBIC ACID, VITAMIN A PALMITATE, CHOLECALCIFEROL, THIAMINE HYDROCHLORIDE, RIBOFLAVIN-5 PHOSPHATE SODIUM, PYRIDOXINE HYDROCHLORIDE, NIACINAMIDE, DEXPANTHENOL, ALPHA-TOCOPHEROL ACETATE, VITAMIN K1, FOLIC ACID, BIOTIN, CYANOCOBALAMIN: 200; 3300; 200; 6; 3.6; 6; 40; 15; 10; 150; 600; 60; 5 INJECTION, SOLUTION INTRAVENOUS at 16:37

## 2022-01-28 RX ADMIN — PROPOFOL 75 MCG/KG/MIN: 10 INJECTION, EMULSION INTRAVENOUS at 17:05

## 2022-01-28 RX ADMIN — DEXMEDETOMIDINE HYDROCHLORIDE 1.5 MCG/KG/HR: 4 INJECTION, SOLUTION INTRAVENOUS at 20:48

## 2022-01-28 RX ADMIN — METOPROLOL TARTRATE 2.5 MG: 1 INJECTION, SOLUTION INTRAVENOUS at 14:12

## 2022-01-28 RX ADMIN — DEXMEDETOMIDINE HYDROCHLORIDE 1.5 MCG/KG/HR: 4 INJECTION, SOLUTION INTRAVENOUS at 08:55

## 2022-01-28 RX ADMIN — SODIUM CHLORIDE 1 G: 900 INJECTION INTRAVENOUS at 08:57

## 2022-01-28 RX ADMIN — PROPOFOL 70 MCG/KG/MIN: 10 INJECTION, EMULSION INTRAVENOUS at 07:08

## 2022-01-28 RX ADMIN — DEXMEDETOMIDINE HYDROCHLORIDE 1.5 MCG/KG/HR: 4 INJECTION, SOLUTION INTRAVENOUS at 07:08

## 2022-01-28 RX ADMIN — MAGNESIUM SULFATE IN DEXTROSE 1 G: 10 INJECTION, SOLUTION INTRAVENOUS at 10:26

## 2022-01-28 RX ADMIN — PROPOFOL 75 MCG/KG/MIN: 10 INJECTION, EMULSION INTRAVENOUS at 21:56

## 2022-01-28 RX ADMIN — DEXMEDETOMIDINE HYDROCHLORIDE 1.5 MCG/KG/HR: 4 INJECTION, SOLUTION INTRAVENOUS at 11:58

## 2022-01-28 RX ADMIN — METOPROLOL TARTRATE 2.5 MG: 1 INJECTION, SOLUTION INTRAVENOUS at 17:44

## 2022-01-28 RX ADMIN — DEXMEDETOMIDINE HYDROCHLORIDE 1.5 MCG/KG/HR: 4 INJECTION, SOLUTION INTRAVENOUS at 23:47

## 2022-01-28 RX ADMIN — PROPOFOL 75 MCG/KG/MIN: 10 INJECTION, EMULSION INTRAVENOUS at 19:40

## 2022-01-28 RX ADMIN — PANTOPRAZOLE SODIUM 40 MG: 40 INJECTION, POWDER, FOR SOLUTION INTRAVENOUS at 08:55

## 2022-01-28 RX ADMIN — ALBUTEROL SULFATE 6 PUFF: 90 AEROSOL, METERED RESPIRATORY (INHALATION) at 18:28

## 2022-01-28 RX ADMIN — LORAZEPAM 1 MG: 2 INJECTION INTRAMUSCULAR; INTRAVENOUS at 14:12

## 2022-01-28 RX ADMIN — ALBUTEROL SULFATE 6 PUFF: 90 AEROSOL, METERED RESPIRATORY (INHALATION) at 07:22

## 2022-01-28 RX ADMIN — DEXMEDETOMIDINE HYDROCHLORIDE 1.5 MCG/KG/HR: 4 INJECTION, SOLUTION INTRAVENOUS at 17:44

## 2022-01-28 RX ADMIN — DEXMEDETOMIDINE HYDROCHLORIDE 1.5 MCG/KG/HR: 4 INJECTION, SOLUTION INTRAVENOUS at 01:33

## 2022-01-29 ENCOUNTER — APPOINTMENT (OUTPATIENT)
Dept: GENERAL RADIOLOGY | Facility: HOSPITAL | Age: 52
End: 2022-01-29

## 2022-01-29 LAB
ANION GAP SERPL CALCULATED.3IONS-SCNC: 5 MMOL/L (ref 5–15)
BASOPHILS # BLD AUTO: 0.04 10*3/MM3 (ref 0–0.2)
BASOPHILS NFR BLD AUTO: 0.4 % (ref 0–1.5)
BUN SERPL-MCNC: 17 MG/DL (ref 6–20)
BUN/CREAT SERPL: 50 (ref 7–25)
CALCIUM SPEC-SCNC: 8.2 MG/DL (ref 8.6–10.5)
CHLORIDE SERPL-SCNC: 101 MMOL/L (ref 98–107)
CO2 SERPL-SCNC: 31 MMOL/L (ref 22–29)
CREAT SERPL-MCNC: 0.34 MG/DL (ref 0.76–1.27)
DEPRECATED RDW RBC AUTO: 52.3 FL (ref 37–54)
EOSINOPHIL # BLD AUTO: 1.04 10*3/MM3 (ref 0–0.4)
EOSINOPHIL NFR BLD AUTO: 11 % (ref 0.3–6.2)
ERYTHROCYTE [DISTWIDTH] IN BLOOD BY AUTOMATED COUNT: 18.1 % (ref 12.3–15.4)
GFR SERPL CREATININE-BSD FRML MDRD: >150 ML/MIN/1.73
GLUCOSE BLDC GLUCOMTR-MCNC: 112 MG/DL (ref 70–130)
GLUCOSE BLDC GLUCOMTR-MCNC: 121 MG/DL (ref 70–130)
GLUCOSE BLDC GLUCOMTR-MCNC: 129 MG/DL (ref 70–130)
GLUCOSE BLDC GLUCOMTR-MCNC: 132 MG/DL (ref 70–130)
GLUCOSE BLDC GLUCOMTR-MCNC: 91 MG/DL (ref 70–130)
GLUCOSE SERPL-MCNC: 123 MG/DL (ref 65–99)
HCT VFR BLD AUTO: 32.2 % (ref 37.5–51)
HGB BLD-MCNC: 9.7 G/DL (ref 13–17.7)
IMM GRANULOCYTES # BLD AUTO: 0.09 10*3/MM3 (ref 0–0.05)
IMM GRANULOCYTES NFR BLD AUTO: 1 % (ref 0–0.5)
LYMPHOCYTES # BLD AUTO: 1.79 10*3/MM3 (ref 0.7–3.1)
LYMPHOCYTES NFR BLD AUTO: 19 % (ref 19.6–45.3)
MAGNESIUM SERPL-MCNC: 1.8 MG/DL (ref 1.6–2.6)
MCH RBC QN AUTO: 24.7 PG (ref 26.6–33)
MCHC RBC AUTO-ENTMCNC: 30.1 G/DL (ref 31.5–35.7)
MCV RBC AUTO: 82.1 FL (ref 79–97)
MONOCYTES # BLD AUTO: 0.99 10*3/MM3 (ref 0.1–0.9)
MONOCYTES NFR BLD AUTO: 10.5 % (ref 5–12)
NEUTROPHILS NFR BLD AUTO: 5.48 10*3/MM3 (ref 1.7–7)
NEUTROPHILS NFR BLD AUTO: 58.1 % (ref 42.7–76)
NRBC BLD AUTO-RTO: 0 /100 WBC (ref 0–0.2)
PLATELET # BLD AUTO: 244 10*3/MM3 (ref 140–450)
PMV BLD AUTO: 12.1 FL (ref 6–12)
POTASSIUM SERPL-SCNC: 3.8 MMOL/L (ref 3.5–5.2)
RBC # BLD AUTO: 3.92 10*6/MM3 (ref 4.14–5.8)
SODIUM SERPL-SCNC: 137 MMOL/L (ref 136–145)
WBC NRBC COR # BLD: 9.43 10*3/MM3 (ref 3.4–10.8)

## 2022-01-29 PROCEDURE — 85025 COMPLETE CBC W/AUTO DIFF WBC: CPT | Performed by: INTERNAL MEDICINE

## 2022-01-29 PROCEDURE — 25010000002 ENOXAPARIN PER 10 MG: Performed by: INTERNAL MEDICINE

## 2022-01-29 PROCEDURE — 99291 CRITICAL CARE FIRST HOUR: CPT | Performed by: INTERNAL MEDICINE

## 2022-01-29 PROCEDURE — 80048 BASIC METABOLIC PNL TOTAL CA: CPT | Performed by: INTERNAL MEDICINE

## 2022-01-29 PROCEDURE — 94003 VENT MGMT INPAT SUBQ DAY: CPT

## 2022-01-29 PROCEDURE — 94799 UNLISTED PULMONARY SVC/PX: CPT

## 2022-01-29 PROCEDURE — 82962 GLUCOSE BLOOD TEST: CPT

## 2022-01-29 PROCEDURE — 25010000002 PROPOFOL 10 MG/ML EMULSION: Performed by: INTERNAL MEDICINE

## 2022-01-29 PROCEDURE — 25010000002 LORAZEPAM PER 2 MG: Performed by: INTERNAL MEDICINE

## 2022-01-29 PROCEDURE — 74018 RADEX ABDOMEN 1 VIEW: CPT

## 2022-01-29 PROCEDURE — 25010000002 METOCLOPRAMIDE PER 10 MG: Performed by: INTERNAL MEDICINE

## 2022-01-29 PROCEDURE — 83735 ASSAY OF MAGNESIUM: CPT | Performed by: INTERNAL MEDICINE

## 2022-01-29 RX ADMIN — LEUCINE, PHENYLALANINE, LYSINE, METHIONINE, ISOLEUCINE, VALINE, HISTIDINE, THREONINE, TRYPTOPHAN, ALANINE, GLYCINE, ARGININE, PROLINE, SERINE, TYROSINE, SODIUM ACETATE, DIBASIC POTASSIUM PHOSPHATE, MAGNESIUM CHLORIDE, SODIUM CHLORIDE, CALCIUM CHLORIDE, DEXTROSE
365; 280; 290; 200; 300; 290; 240; 210; 90; 1035; 515; 575; 340; 250; 20; 340; 261; 51; 59; 33; 15 INJECTION INTRAVENOUS at 14:19

## 2022-01-29 RX ADMIN — DEXMEDETOMIDINE HYDROCHLORIDE 1.5 MCG/KG/HR: 4 INJECTION, SOLUTION INTRAVENOUS at 02:24

## 2022-01-29 RX ADMIN — DOCUSATE SODIUM 50 MG AND SENNOSIDES 8.6 MG 1 TABLET: 8.6; 5 TABLET, FILM COATED ORAL at 20:42

## 2022-01-29 RX ADMIN — OXYCODONE HYDROCHLORIDE 5 MG: 5 SOLUTION ORAL at 17:12

## 2022-01-29 RX ADMIN — ZINC SULFATE 220 MG (50 MG) CAPSULE 220 MG: CAPSULE at 09:27

## 2022-01-29 RX ADMIN — OXYCODONE HYDROCHLORIDE 5 MG: 5 SOLUTION ORAL at 12:00

## 2022-01-29 RX ADMIN — ATORVASTATIN CALCIUM 20 MG: 10 TABLET, FILM COATED ORAL at 20:42

## 2022-01-29 RX ADMIN — PANTOPRAZOLE SODIUM 40 MG: 40 INJECTION, POWDER, FOR SOLUTION INTRAVENOUS at 09:27

## 2022-01-29 RX ADMIN — PROPOFOL 75 MCG/KG/MIN: 10 INJECTION, EMULSION INTRAVENOUS at 07:12

## 2022-01-29 RX ADMIN — OXYCODONE HYDROCHLORIDE AND ACETAMINOPHEN 500 MG: 500 TABLET ORAL at 09:27

## 2022-01-29 RX ADMIN — ALBUTEROL SULFATE 6 PUFF: 90 AEROSOL, METERED RESPIRATORY (INHALATION) at 15:00

## 2022-01-29 RX ADMIN — METOPROLOL TARTRATE 2.5 MG: 1 INJECTION, SOLUTION INTRAVENOUS at 12:00

## 2022-01-29 RX ADMIN — DEXMEDETOMIDINE HYDROCHLORIDE 1.5 MCG/KG/HR: 4 INJECTION, SOLUTION INTRAVENOUS at 16:31

## 2022-01-29 RX ADMIN — ALBUTEROL SULFATE 6 PUFF: 90 AEROSOL, METERED RESPIRATORY (INHALATION) at 10:58

## 2022-01-29 RX ADMIN — DEXMEDETOMIDINE HYDROCHLORIDE 1.5 MCG/KG/HR: 4 INJECTION, SOLUTION INTRAVENOUS at 19:27

## 2022-01-29 RX ADMIN — PROPOFOL 65 MCG/KG/MIN: 10 INJECTION, EMULSION INTRAVENOUS at 15:36

## 2022-01-29 RX ADMIN — ALBUTEROL SULFATE 6 PUFF: 90 AEROSOL, METERED RESPIRATORY (INHALATION) at 18:49

## 2022-01-29 RX ADMIN — OXYCODONE HYDROCHLORIDE 5 MG: 5 SOLUTION ORAL at 23:25

## 2022-01-29 RX ADMIN — DOCUSATE SODIUM 50 MG AND SENNOSIDES 8.6 MG 1 TABLET: 8.6; 5 TABLET, FILM COATED ORAL at 09:27

## 2022-01-29 RX ADMIN — PROPOFOL 75 MCG/KG/MIN: 10 INJECTION, EMULSION INTRAVENOUS at 04:42

## 2022-01-29 RX ADMIN — DEXMEDETOMIDINE HYDROCHLORIDE 1.5 MCG/KG/HR: 4 INJECTION, SOLUTION INTRAVENOUS at 22:12

## 2022-01-29 RX ADMIN — METOPROLOL TARTRATE 2.5 MG: 1 INJECTION, SOLUTION INTRAVENOUS at 05:32

## 2022-01-29 RX ADMIN — BISACODYL 10 MG: 10 SUPPOSITORY RECTAL at 11:49

## 2022-01-29 RX ADMIN — Medication 1000 UNITS: at 09:27

## 2022-01-29 RX ADMIN — THIAMINE HCL TAB 100 MG 100 MG: 100 TAB at 09:27

## 2022-01-29 RX ADMIN — PROPOFOL 70 MCG/KG/MIN: 10 INJECTION, EMULSION INTRAVENOUS at 18:47

## 2022-01-29 RX ADMIN — LORAZEPAM 1 MG: 2 INJECTION INTRAMUSCULAR; INTRAVENOUS at 05:35

## 2022-01-29 RX ADMIN — PROPOFOL 70 MCG/KG/MIN: 10 INJECTION, EMULSION INTRAVENOUS at 23:46

## 2022-01-29 RX ADMIN — LORAZEPAM 1 MG: 2 INJECTION INTRAMUSCULAR; INTRAVENOUS at 14:17

## 2022-01-29 RX ADMIN — DEXMEDETOMIDINE HYDROCHLORIDE 1.4 MCG/KG/HR: 4 INJECTION, SOLUTION INTRAVENOUS at 07:34

## 2022-01-29 RX ADMIN — GUAIFENESIN 400 MG: 100 SOLUTION ORAL at 14:17

## 2022-01-29 RX ADMIN — PROPOFOL 75 MCG/KG/MIN: 10 INJECTION, EMULSION INTRAVENOUS at 02:24

## 2022-01-29 RX ADMIN — METOCLOPRAMIDE HYDROCHLORIDE 10 MG: 5 INJECTION INTRAMUSCULAR; INTRAVENOUS at 07:34

## 2022-01-29 RX ADMIN — METOPROLOL TARTRATE 2.5 MG: 1 INJECTION, SOLUTION INTRAVENOUS at 17:12

## 2022-01-29 RX ADMIN — ENOXAPARIN SODIUM 100 MG: 100 INJECTION SUBCUTANEOUS at 12:00

## 2022-01-29 RX ADMIN — BUDESONIDE AND FORMOTEROL FUMARATE DIHYDRATE 2 PUFF: 160; 4.5 AEROSOL RESPIRATORY (INHALATION) at 18:49

## 2022-01-29 RX ADMIN — PROPOFOL 60 MCG/KG/MIN: 10 INJECTION, EMULSION INTRAVENOUS at 10:20

## 2022-01-29 RX ADMIN — POLYETHYLENE GLYCOL 3350 17 G: 17 POWDER, FOR SOLUTION ORAL at 09:27

## 2022-01-29 RX ADMIN — GUAIFENESIN 400 MG: 100 SOLUTION ORAL at 21:04

## 2022-01-29 RX ADMIN — DEXMEDETOMIDINE HYDROCHLORIDE 1.5 MCG/KG/HR: 4 INJECTION, SOLUTION INTRAVENOUS at 11:50

## 2022-01-29 RX ADMIN — ALBUTEROL SULFATE 6 PUFF: 90 AEROSOL, METERED RESPIRATORY (INHALATION) at 07:26

## 2022-01-29 RX ADMIN — METOCLOPRAMIDE HYDROCHLORIDE 10 MG: 5 INJECTION INTRAMUSCULAR; INTRAVENOUS at 20:42

## 2022-01-29 RX ADMIN — BUDESONIDE AND FORMOTEROL FUMARATE DIHYDRATE 2 PUFF: 160; 4.5 AEROSOL RESPIRATORY (INHALATION) at 07:26

## 2022-01-29 RX ADMIN — TERAZOSIN HYDROCHLORIDE 1 MG: 1 CAPSULE ORAL at 20:42

## 2022-01-29 RX ADMIN — METOPROLOL TARTRATE 2.5 MG: 1 INJECTION, SOLUTION INTRAVENOUS at 23:25

## 2022-01-29 RX ADMIN — SODIUM CHLORIDE, PRESERVATIVE FREE 10 ML: 5 INJECTION INTRAVENOUS at 09:27

## 2022-01-29 RX ADMIN — PROPOFOL 65 MCG/KG/MIN: 10 INJECTION, EMULSION INTRAVENOUS at 12:38

## 2022-01-29 RX ADMIN — METOCLOPRAMIDE HYDROCHLORIDE 10 MG: 5 INJECTION INTRAMUSCULAR; INTRAVENOUS at 17:12

## 2022-01-29 RX ADMIN — PANTOPRAZOLE SODIUM 40 MG: 40 INJECTION, POWDER, FOR SOLUTION INTRAVENOUS at 20:41

## 2022-01-29 RX ADMIN — DEXMEDETOMIDINE HYDROCHLORIDE 1.5 MCG/KG/HR: 4 INJECTION, SOLUTION INTRAVENOUS at 04:56

## 2022-01-29 RX ADMIN — ENOXAPARIN SODIUM 100 MG: 100 INJECTION SUBCUTANEOUS at 23:26

## 2022-01-29 RX ADMIN — PROPOFOL 70 MCG/KG/MIN: 10 INJECTION, EMULSION INTRAVENOUS at 20:43

## 2022-01-29 RX ADMIN — METOCLOPRAMIDE HYDROCHLORIDE 10 MG: 5 INJECTION INTRAMUSCULAR; INTRAVENOUS at 12:00

## 2022-01-29 RX ADMIN — DEXMEDETOMIDINE HYDROCHLORIDE 1.5 MCG/KG/HR: 4 INJECTION, SOLUTION INTRAVENOUS at 13:44

## 2022-01-30 LAB
ARTERIAL PATENCY WRIST A: POSITIVE
ATMOSPHERIC PRESS: 748 MMHG
BASE EXCESS BLDA CALC-SCNC: 8.3 MMOL/L (ref 0–2)
BDY SITE: ABNORMAL
BODY TEMPERATURE: 37 C
DEPRECATED RDW RBC AUTO: 54 FL (ref 37–54)
ERYTHROCYTE [DISTWIDTH] IN BLOOD BY AUTOMATED COUNT: 18.4 % (ref 12.3–15.4)
GLUCOSE BLDC GLUCOMTR-MCNC: 104 MG/DL (ref 70–130)
GLUCOSE BLDC GLUCOMTR-MCNC: 106 MG/DL (ref 70–130)
GLUCOSE BLDC GLUCOMTR-MCNC: 112 MG/DL (ref 70–130)
HCO3 BLDA-SCNC: 33.1 MMOL/L (ref 20–26)
HCT VFR BLD AUTO: 32.5 % (ref 37.5–51)
HGB BLD-MCNC: 10 G/DL (ref 13–17.7)
INHALED O2 CONCENTRATION: 30 %
Lab: ABNORMAL
MCH RBC QN AUTO: 25.8 PG (ref 26.6–33)
MCHC RBC AUTO-ENTMCNC: 30.8 G/DL (ref 31.5–35.7)
MCV RBC AUTO: 83.8 FL (ref 79–97)
MODALITY: ABNORMAL
PCO2 BLDA: 46.5 MM HG (ref 35–45)
PCO2 TEMP ADJ BLD: 46.5 MM HG (ref 35–45)
PEEP RESPIRATORY: 5 CM[H2O]
PH BLDA: 7.46 PH UNITS (ref 7.35–7.45)
PH, TEMP CORRECTED: 7.46 PH UNITS (ref 7.35–7.45)
PLATELET # BLD AUTO: 237 10*3/MM3 (ref 140–450)
PMV BLD AUTO: 11.7 FL (ref 6–12)
PO2 BLDA: 78.2 MM HG (ref 83–108)
PO2 TEMP ADJ BLD: 78.2 MM HG (ref 83–108)
QT INTERVAL: 278 MS
QTC INTERVAL: 471 MS
RBC # BLD AUTO: 3.88 10*6/MM3 (ref 4.14–5.8)
SAO2 % BLDCOA: 96.2 % (ref 94–99)
SET MECH RESP RATE: 18
VENTILATOR MODE: AC
VT ON VENT VENT: 500 ML
WBC NRBC COR # BLD: 8.83 10*3/MM3 (ref 3.4–10.8)

## 2022-01-30 PROCEDURE — 82962 GLUCOSE BLOOD TEST: CPT

## 2022-01-30 PROCEDURE — 25010000002 ENOXAPARIN PER 10 MG: Performed by: INTERNAL MEDICINE

## 2022-01-30 PROCEDURE — 94003 VENT MGMT INPAT SUBQ DAY: CPT

## 2022-01-30 PROCEDURE — 25010000002 METOCLOPRAMIDE PER 10 MG: Performed by: INTERNAL MEDICINE

## 2022-01-30 PROCEDURE — 82803 BLOOD GASES ANY COMBINATION: CPT

## 2022-01-30 PROCEDURE — 99291 CRITICAL CARE FIRST HOUR: CPT | Performed by: INTERNAL MEDICINE

## 2022-01-30 PROCEDURE — 36600 WITHDRAWAL OF ARTERIAL BLOOD: CPT

## 2022-01-30 PROCEDURE — 25010000002 CALCIUM GLUCONATE PER 10 ML: Performed by: INTERNAL MEDICINE

## 2022-01-30 PROCEDURE — 85027 COMPLETE CBC AUTOMATED: CPT | Performed by: INTERNAL MEDICINE

## 2022-01-30 PROCEDURE — 94799 UNLISTED PULMONARY SVC/PX: CPT

## 2022-01-30 PROCEDURE — 25010000002 PROPOFOL 10 MG/ML EMULSION: Performed by: INTERNAL MEDICINE

## 2022-01-30 PROCEDURE — 25010000002 MAGNESIUM SULFATE PER 500 MG OF MAGNESIUM: Performed by: INTERNAL MEDICINE

## 2022-01-30 RX ADMIN — GUAIFENESIN 400 MG: 100 SOLUTION ORAL at 14:49

## 2022-01-30 RX ADMIN — ZINC SULFATE 220 MG (50 MG) CAPSULE 220 MG: CAPSULE at 08:39

## 2022-01-30 RX ADMIN — OXYCODONE HYDROCHLORIDE 5 MG: 5 SOLUTION ORAL at 05:44

## 2022-01-30 RX ADMIN — DEXMEDETOMIDINE HYDROCHLORIDE 1.4 MCG/KG/HR: 4 INJECTION, SOLUTION INTRAVENOUS at 10:29

## 2022-01-30 RX ADMIN — OXYCODONE HYDROCHLORIDE 5 MG: 5 SOLUTION ORAL at 12:03

## 2022-01-30 RX ADMIN — OXYCODONE HYDROCHLORIDE 5 MG: 5 SOLUTION ORAL at 17:30

## 2022-01-30 RX ADMIN — PROPOFOL 65 MCG/KG/MIN: 10 INJECTION, EMULSION INTRAVENOUS at 07:35

## 2022-01-30 RX ADMIN — METOCLOPRAMIDE HYDROCHLORIDE 10 MG: 5 INJECTION INTRAMUSCULAR; INTRAVENOUS at 21:26

## 2022-01-30 RX ADMIN — PROPOFOL 70 MCG/KG/MIN: 10 INJECTION, EMULSION INTRAVENOUS at 10:29

## 2022-01-30 RX ADMIN — DOCUSATE SODIUM 50 MG AND SENNOSIDES 8.6 MG 1 TABLET: 8.6; 5 TABLET, FILM COATED ORAL at 08:39

## 2022-01-30 RX ADMIN — METOPROLOL TARTRATE 2.5 MG: 1 INJECTION, SOLUTION INTRAVENOUS at 18:40

## 2022-01-30 RX ADMIN — ATORVASTATIN CALCIUM 20 MG: 10 TABLET, FILM COATED ORAL at 21:27

## 2022-01-30 RX ADMIN — PROPOFOL 70 MCG/KG/MIN: 10 INJECTION, EMULSION INTRAVENOUS at 18:40

## 2022-01-30 RX ADMIN — DEXMEDETOMIDINE HYDROCHLORIDE 1.4 MCG/KG/HR: 4 INJECTION, SOLUTION INTRAVENOUS at 19:50

## 2022-01-30 RX ADMIN — BUDESONIDE AND FORMOTEROL FUMARATE DIHYDRATE 2 PUFF: 160; 4.5 AEROSOL RESPIRATORY (INHALATION) at 07:16

## 2022-01-30 RX ADMIN — DEXMEDETOMIDINE HYDROCHLORIDE 1.4 MCG/KG/HR: 4 INJECTION, SOLUTION INTRAVENOUS at 07:35

## 2022-01-30 RX ADMIN — DEXMEDETOMIDINE HYDROCHLORIDE 1.4 MCG/KG/HR: 4 INJECTION, SOLUTION INTRAVENOUS at 04:36

## 2022-01-30 RX ADMIN — DOCUSATE SODIUM 50 MG AND SENNOSIDES 8.6 MG 1 TABLET: 8.6; 5 TABLET, FILM COATED ORAL at 21:27

## 2022-01-30 RX ADMIN — OXYCODONE HYDROCHLORIDE AND ACETAMINOPHEN 500 MG: 500 TABLET ORAL at 08:39

## 2022-01-30 RX ADMIN — PROPOFOL 70 MCG/KG/MIN: 10 INJECTION, EMULSION INTRAVENOUS at 04:36

## 2022-01-30 RX ADMIN — ALBUTEROL SULFATE 6 PUFF: 90 AEROSOL, METERED RESPIRATORY (INHALATION) at 07:16

## 2022-01-30 RX ADMIN — DEXMEDETOMIDINE HYDROCHLORIDE 1.4 MCG/KG/HR: 4 INJECTION, SOLUTION INTRAVENOUS at 01:25

## 2022-01-30 RX ADMIN — METOPROLOL TARTRATE 2.5 MG: 1 INJECTION, SOLUTION INTRAVENOUS at 05:44

## 2022-01-30 RX ADMIN — METOPROLOL TARTRATE 2.5 MG: 1 INJECTION, SOLUTION INTRAVENOUS at 12:03

## 2022-01-30 RX ADMIN — DEXMEDETOMIDINE HYDROCHLORIDE 1.4 MCG/KG/HR: 4 INJECTION, SOLUTION INTRAVENOUS at 14:01

## 2022-01-30 RX ADMIN — THIAMINE HCL TAB 100 MG 100 MG: 100 TAB at 08:39

## 2022-01-30 RX ADMIN — ALBUTEROL SULFATE 6 PUFF: 90 AEROSOL, METERED RESPIRATORY (INHALATION) at 10:26

## 2022-01-30 RX ADMIN — POLYETHYLENE GLYCOL 3350 17 G: 17 POWDER, FOR SOLUTION ORAL at 08:39

## 2022-01-30 RX ADMIN — DEXMEDETOMIDINE HYDROCHLORIDE 1.4 MCG/KG/HR: 4 INJECTION, SOLUTION INTRAVENOUS at 17:01

## 2022-01-30 RX ADMIN — GUAIFENESIN 400 MG: 100 SOLUTION ORAL at 21:26

## 2022-01-30 RX ADMIN — BUDESONIDE AND FORMOTEROL FUMARATE DIHYDRATE 2 PUFF: 160; 4.5 AEROSOL RESPIRATORY (INHALATION) at 18:57

## 2022-01-30 RX ADMIN — PROPOFOL 70 MCG/KG/MIN: 10 INJECTION, EMULSION INTRAVENOUS at 15:35

## 2022-01-30 RX ADMIN — PROPOFOL 70 MCG/KG/MIN: 10 INJECTION, EMULSION INTRAVENOUS at 01:44

## 2022-01-30 RX ADMIN — DEXMEDETOMIDINE HYDROCHLORIDE 1.5 MCG/KG/HR: 4 INJECTION, SOLUTION INTRAVENOUS at 22:52

## 2022-01-30 RX ADMIN — ENOXAPARIN SODIUM 100 MG: 100 INJECTION SUBCUTANEOUS at 12:03

## 2022-01-30 RX ADMIN — PANTOPRAZOLE SODIUM 40 MG: 40 INJECTION, POWDER, FOR SOLUTION INTRAVENOUS at 08:39

## 2022-01-30 RX ADMIN — ALBUTEROL SULFATE 6 PUFF: 90 AEROSOL, METERED RESPIRATORY (INHALATION) at 18:57

## 2022-01-30 RX ADMIN — PROPOFOL 70 MCG/KG/MIN: 10 INJECTION, EMULSION INTRAVENOUS at 21:43

## 2022-01-30 RX ADMIN — TERAZOSIN HYDROCHLORIDE 1 MG: 1 CAPSULE ORAL at 21:27

## 2022-01-30 RX ADMIN — Medication 1000 UNITS: at 08:39

## 2022-01-30 RX ADMIN — ALBUTEROL SULFATE 6 PUFF: 90 AEROSOL, METERED RESPIRATORY (INHALATION) at 14:41

## 2022-01-30 RX ADMIN — METOCLOPRAMIDE HYDROCHLORIDE 10 MG: 5 INJECTION INTRAMUSCULAR; INTRAVENOUS at 07:38

## 2022-01-30 RX ADMIN — GUAIFENESIN 400 MG: 100 SOLUTION ORAL at 05:44

## 2022-01-30 RX ADMIN — METOCLOPRAMIDE HYDROCHLORIDE 10 MG: 5 INJECTION INTRAMUSCULAR; INTRAVENOUS at 12:03

## 2022-01-30 RX ADMIN — POTASSIUM PHOSPHATE, MONOBASIC POTASSIUM PHOSPHATE, DIBASIC: 224; 236 INJECTION, SOLUTION, CONCENTRATE INTRAVENOUS at 17:30

## 2022-01-30 RX ADMIN — METOCLOPRAMIDE HYDROCHLORIDE 10 MG: 5 INJECTION INTRAMUSCULAR; INTRAVENOUS at 17:30

## 2022-01-30 RX ADMIN — PROPOFOL 70 MCG/KG/MIN: 10 INJECTION, EMULSION INTRAVENOUS at 13:22

## 2022-01-30 RX ADMIN — PANTOPRAZOLE SODIUM 40 MG: 40 INJECTION, POWDER, FOR SOLUTION INTRAVENOUS at 21:26

## 2022-01-31 LAB
ANION GAP SERPL CALCULATED.3IONS-SCNC: 7 MMOL/L (ref 5–15)
ARTERIAL PATENCY WRIST A: POSITIVE
ATMOSPHERIC PRESS: 753 MMHG
BASE EXCESS BLDA CALC-SCNC: 8 MMOL/L (ref 0–2)
BASOPHILS # BLD AUTO: 0.04 10*3/MM3 (ref 0–0.2)
BASOPHILS NFR BLD AUTO: 0.5 % (ref 0–1.5)
BDY SITE: ABNORMAL
BODY TEMPERATURE: 37 C
BUN SERPL-MCNC: 17 MG/DL (ref 6–20)
BUN/CREAT SERPL: 54.8 (ref 7–25)
CALCIUM SPEC-SCNC: 8.3 MG/DL (ref 8.6–10.5)
CHLORIDE SERPL-SCNC: 101 MMOL/L (ref 98–107)
CO2 SERPL-SCNC: 30 MMOL/L (ref 22–29)
CREAT SERPL-MCNC: 0.31 MG/DL (ref 0.76–1.27)
DEPRECATED RDW RBC AUTO: 54.4 FL (ref 37–54)
EOSINOPHIL # BLD AUTO: 1.14 10*3/MM3 (ref 0–0.4)
EOSINOPHIL NFR BLD AUTO: 15.1 % (ref 0.3–6.2)
ERYTHROCYTE [DISTWIDTH] IN BLOOD BY AUTOMATED COUNT: 18.6 % (ref 12.3–15.4)
GFR SERPL CREATININE-BSD FRML MDRD: >150 ML/MIN/1.73
GLUCOSE BLDC GLUCOMTR-MCNC: 101 MG/DL (ref 70–130)
GLUCOSE BLDC GLUCOMTR-MCNC: 103 MG/DL (ref 70–130)
GLUCOSE BLDC GLUCOMTR-MCNC: 108 MG/DL (ref 70–130)
GLUCOSE BLDC GLUCOMTR-MCNC: 110 MG/DL (ref 70–130)
GLUCOSE BLDC GLUCOMTR-MCNC: 125 MG/DL (ref 70–130)
GLUCOSE SERPL-MCNC: 108 MG/DL (ref 65–99)
HCO3 BLDA-SCNC: 32.6 MMOL/L (ref 20–26)
HCT VFR BLD AUTO: 34.4 % (ref 37.5–51)
HGB BLD-MCNC: 10.1 G/DL (ref 13–17.7)
IMM GRANULOCYTES # BLD AUTO: 0.11 10*3/MM3 (ref 0–0.05)
IMM GRANULOCYTES NFR BLD AUTO: 1.5 % (ref 0–0.5)
INHALED O2 CONCENTRATION: 30 %
LYMPHOCYTES # BLD AUTO: 2.03 10*3/MM3 (ref 0.7–3.1)
LYMPHOCYTES NFR BLD AUTO: 26.9 % (ref 19.6–45.3)
Lab: ABNORMAL
MAGNESIUM SERPL-MCNC: 1.7 MG/DL (ref 1.6–2.6)
MCH RBC QN AUTO: 24.5 PG (ref 26.6–33)
MCHC RBC AUTO-ENTMCNC: 29.4 G/DL (ref 31.5–35.7)
MCV RBC AUTO: 83.5 FL (ref 79–97)
MODALITY: ABNORMAL
MONOCYTES # BLD AUTO: 0.84 10*3/MM3 (ref 0.1–0.9)
MONOCYTES NFR BLD AUTO: 11.1 % (ref 5–12)
NEUTROPHILS NFR BLD AUTO: 3.38 10*3/MM3 (ref 1.7–7)
NEUTROPHILS NFR BLD AUTO: 44.9 % (ref 42.7–76)
NRBC BLD AUTO-RTO: 0 /100 WBC (ref 0–0.2)
PCO2 BLDA: 45.3 MM HG (ref 35–45)
PCO2 TEMP ADJ BLD: 45.3 MM HG (ref 35–45)
PEEP RESPIRATORY: 5 CM[H2O]
PH BLDA: 7.46 PH UNITS (ref 7.35–7.45)
PH, TEMP CORRECTED: 7.46 PH UNITS (ref 7.35–7.45)
PHOSPHATE SERPL-MCNC: 3.7 MG/DL (ref 2.5–4.5)
PLATELET # BLD AUTO: 223 10*3/MM3 (ref 140–450)
PMV BLD AUTO: 12.5 FL (ref 6–12)
PO2 BLDA: 81.2 MM HG (ref 83–108)
PO2 TEMP ADJ BLD: 81.2 MM HG (ref 83–108)
POTASSIUM SERPL-SCNC: 4 MMOL/L (ref 3.5–5.2)
RBC # BLD AUTO: 4.12 10*6/MM3 (ref 4.14–5.8)
SAO2 % BLDCOA: 96.5 % (ref 94–99)
SET MECH RESP RATE: 18
SODIUM SERPL-SCNC: 138 MMOL/L (ref 136–145)
TRIGL SERPL-MCNC: 177 MG/DL (ref 0–150)
VENTILATOR MODE: AC
VT ON VENT VENT: 500 ML
WBC NRBC COR # BLD: 7.54 10*3/MM3 (ref 3.4–10.8)

## 2022-01-31 PROCEDURE — 25010000002 METOCLOPRAMIDE PER 10 MG: Performed by: INTERNAL MEDICINE

## 2022-01-31 PROCEDURE — 25010000002 CALCIUM GLUCONATE PER 10 ML: Performed by: INTERNAL MEDICINE

## 2022-01-31 PROCEDURE — 80048 BASIC METABOLIC PNL TOTAL CA: CPT | Performed by: INTERNAL MEDICINE

## 2022-01-31 PROCEDURE — 84478 ASSAY OF TRIGLYCERIDES: CPT | Performed by: INTERNAL MEDICINE

## 2022-01-31 PROCEDURE — 82962 GLUCOSE BLOOD TEST: CPT

## 2022-01-31 PROCEDURE — 84100 ASSAY OF PHOSPHORUS: CPT | Performed by: INTERNAL MEDICINE

## 2022-01-31 PROCEDURE — 83735 ASSAY OF MAGNESIUM: CPT | Performed by: INTERNAL MEDICINE

## 2022-01-31 PROCEDURE — 94799 UNLISTED PULMONARY SVC/PX: CPT

## 2022-01-31 PROCEDURE — 25010000002 ENOXAPARIN PER 10 MG: Performed by: INTERNAL MEDICINE

## 2022-01-31 PROCEDURE — 99233 SBSQ HOSP IP/OBS HIGH 50: CPT | Performed by: INTERNAL MEDICINE

## 2022-01-31 PROCEDURE — 99232 SBSQ HOSP IP/OBS MODERATE 35: CPT | Performed by: OTOLARYNGOLOGY

## 2022-01-31 PROCEDURE — 85025 COMPLETE CBC W/AUTO DIFF WBC: CPT | Performed by: INTERNAL MEDICINE

## 2022-01-31 PROCEDURE — 25010000002 MAGNESIUM SULFATE PER 500 MG OF MAGNESIUM: Performed by: INTERNAL MEDICINE

## 2022-01-31 PROCEDURE — 25010000002 PROPOFOL 10 MG/ML EMULSION: Performed by: INTERNAL MEDICINE

## 2022-01-31 PROCEDURE — 94003 VENT MGMT INPAT SUBQ DAY: CPT

## 2022-01-31 PROCEDURE — 36600 WITHDRAWAL OF ARTERIAL BLOOD: CPT

## 2022-01-31 PROCEDURE — 82803 BLOOD GASES ANY COMBINATION: CPT

## 2022-01-31 PROCEDURE — 94640 AIRWAY INHALATION TREATMENT: CPT

## 2022-01-31 RX ORDER — ALBUTEROL SULFATE 2.5 MG/3ML
2.5 SOLUTION RESPIRATORY (INHALATION)
Status: DISCONTINUED | OUTPATIENT
Start: 2022-01-31 | End: 2022-02-09 | Stop reason: HOSPADM

## 2022-01-31 RX ORDER — ERYTHROMYCIN ETHYLSUCCINATE 200 MG/5ML
100 SUSPENSION ORAL 2 TIMES DAILY WITH MEALS
Status: COMPLETED | OUTPATIENT
Start: 2022-01-31 | End: 2022-02-04

## 2022-01-31 RX ORDER — METOCLOPRAMIDE HYDROCHLORIDE 5 MG/ML
5 INJECTION INTRAMUSCULAR; INTRAVENOUS 3 TIMES DAILY
Status: DISCONTINUED | OUTPATIENT
Start: 2022-01-31 | End: 2022-02-09 | Stop reason: HOSPADM

## 2022-01-31 RX ADMIN — BUDESONIDE AND FORMOTEROL FUMARATE DIHYDRATE 2 PUFF: 160; 4.5 AEROSOL RESPIRATORY (INHALATION) at 06:23

## 2022-01-31 RX ADMIN — PROPOFOL 70 MCG/KG/MIN: 10 INJECTION, EMULSION INTRAVENOUS at 18:01

## 2022-01-31 RX ADMIN — METOPROLOL TARTRATE 2.5 MG: 1 INJECTION, SOLUTION INTRAVENOUS at 11:32

## 2022-01-31 RX ADMIN — ALBUTEROL SULFATE 6 PUFF: 90 AEROSOL, METERED RESPIRATORY (INHALATION) at 06:23

## 2022-01-31 RX ADMIN — OXYCODONE HYDROCHLORIDE 5 MG: 5 SOLUTION ORAL at 01:26

## 2022-01-31 RX ADMIN — PROPOFOL 75 MCG/KG/MIN: 10 INJECTION, EMULSION INTRAVENOUS at 23:03

## 2022-01-31 RX ADMIN — ENOXAPARIN SODIUM 100 MG: 100 INJECTION SUBCUTANEOUS at 11:32

## 2022-01-31 RX ADMIN — PROPOFOL 70 MCG/KG/MIN: 10 INJECTION, EMULSION INTRAVENOUS at 02:20

## 2022-01-31 RX ADMIN — ALBUTEROL SULFATE 2.5 MG: 2.5 SOLUTION RESPIRATORY (INHALATION) at 23:10

## 2022-01-31 RX ADMIN — PROPOFOL 75 MCG/KG/MIN: 10 INJECTION, EMULSION INTRAVENOUS at 00:07

## 2022-01-31 RX ADMIN — METOPROLOL TARTRATE 2.5 MG: 1 INJECTION, SOLUTION INTRAVENOUS at 01:27

## 2022-01-31 RX ADMIN — ALBUTEROL SULFATE 6 PUFF: 90 AEROSOL, METERED RESPIRATORY (INHALATION) at 14:41

## 2022-01-31 RX ADMIN — ATORVASTATIN CALCIUM 20 MG: 10 TABLET, FILM COATED ORAL at 21:19

## 2022-01-31 RX ADMIN — DOCUSATE SODIUM 50 MG AND SENNOSIDES 8.6 MG 1 TABLET: 8.6; 5 TABLET, FILM COATED ORAL at 08:47

## 2022-01-31 RX ADMIN — PANTOPRAZOLE SODIUM 40 MG: 40 INJECTION, POWDER, FOR SOLUTION INTRAVENOUS at 21:19

## 2022-01-31 RX ADMIN — GUAIFENESIN 400 MG: 100 SOLUTION ORAL at 14:05

## 2022-01-31 RX ADMIN — ERYTHROMYCIN ETHYLSUCCINATE 100 MG: 200 GRANULE, FOR SUSPENSION ORAL at 09:44

## 2022-01-31 RX ADMIN — Medication 1000 UNITS: at 08:47

## 2022-01-31 RX ADMIN — ACETAMINOPHEN 650 MG: 160 SOLUTION ORAL at 22:54

## 2022-01-31 RX ADMIN — DEXMEDETOMIDINE HYDROCHLORIDE 1.4 MCG/KG/HR: 4 INJECTION, SOLUTION INTRAVENOUS at 15:42

## 2022-01-31 RX ADMIN — OXYCODONE HYDROCHLORIDE 5 MG: 5 SOLUTION ORAL at 06:28

## 2022-01-31 RX ADMIN — DEXMEDETOMIDINE HYDROCHLORIDE 1.4 MCG/KG/HR: 4 INJECTION, SOLUTION INTRAVENOUS at 04:50

## 2022-01-31 RX ADMIN — PANTOPRAZOLE SODIUM 40 MG: 40 INJECTION, POWDER, FOR SOLUTION INTRAVENOUS at 08:47

## 2022-01-31 RX ADMIN — PROPOFOL 70 MCG/KG/MIN: 10 INJECTION, EMULSION INTRAVENOUS at 13:20

## 2022-01-31 RX ADMIN — METOCLOPRAMIDE HYDROCHLORIDE 5 MG: 5 INJECTION INTRAMUSCULAR; INTRAVENOUS at 16:33

## 2022-01-31 RX ADMIN — ALBUTEROL SULFATE 2.5 MG: 2.5 SOLUTION RESPIRATORY (INHALATION) at 19:21

## 2022-01-31 RX ADMIN — DEXMEDETOMIDINE HYDROCHLORIDE 1.4 MCG/KG/HR: 4 INJECTION, SOLUTION INTRAVENOUS at 07:25

## 2022-01-31 RX ADMIN — TERAZOSIN HYDROCHLORIDE 1 MG: 1 CAPSULE ORAL at 21:20

## 2022-01-31 RX ADMIN — DEXMEDETOMIDINE HYDROCHLORIDE 1.4 MCG/KG/HR: 4 INJECTION, SOLUTION INTRAVENOUS at 02:19

## 2022-01-31 RX ADMIN — POTASSIUM PHOSPHATE, MONOBASIC POTASSIUM PHOSPHATE, DIBASIC: 224; 236 INJECTION, SOLUTION, CONCENTRATE INTRAVENOUS at 18:01

## 2022-01-31 RX ADMIN — PROPOFOL 70 MCG/KG/MIN: 10 INJECTION, EMULSION INTRAVENOUS at 07:25

## 2022-01-31 RX ADMIN — DEXMEDETOMIDINE HYDROCHLORIDE 1.5 MCG/KG/HR: 4 INJECTION, SOLUTION INTRAVENOUS at 19:47

## 2022-01-31 RX ADMIN — POLYETHYLENE GLYCOL 3350 17 G: 17 POWDER, FOR SOLUTION ORAL at 08:47

## 2022-01-31 RX ADMIN — DOCUSATE SODIUM 50 MG AND SENNOSIDES 8.6 MG 1 TABLET: 8.6; 5 TABLET, FILM COATED ORAL at 21:20

## 2022-01-31 RX ADMIN — ALBUTEROL SULFATE 6 PUFF: 90 AEROSOL, METERED RESPIRATORY (INHALATION) at 11:14

## 2022-01-31 RX ADMIN — SODIUM CHLORIDE, PRESERVATIVE FREE 10 ML: 5 INJECTION INTRAVENOUS at 08:47

## 2022-01-31 RX ADMIN — PROPOFOL 70 MCG/KG/MIN: 10 INJECTION, EMULSION INTRAVENOUS at 15:42

## 2022-01-31 RX ADMIN — METOCLOPRAMIDE HYDROCHLORIDE 10 MG: 5 INJECTION INTRAMUSCULAR; INTRAVENOUS at 07:35

## 2022-01-31 RX ADMIN — THIAMINE HCL TAB 100 MG 100 MG: 100 TAB at 08:47

## 2022-01-31 RX ADMIN — BUDESONIDE AND FORMOTEROL FUMARATE DIHYDRATE 2 PUFF: 160; 4.5 AEROSOL RESPIRATORY (INHALATION) at 18:02

## 2022-01-31 RX ADMIN — PROPOFOL 75 MCG/KG/MIN: 10 INJECTION, EMULSION INTRAVENOUS at 21:09

## 2022-01-31 RX ADMIN — METOPROLOL TARTRATE 2.5 MG: 1 INJECTION, SOLUTION INTRAVENOUS at 18:01

## 2022-01-31 RX ADMIN — DEXMEDETOMIDINE HYDROCHLORIDE 1.4 MCG/KG/HR: 4 INJECTION, SOLUTION INTRAVENOUS at 13:49

## 2022-01-31 RX ADMIN — GUAIFENESIN 400 MG: 100 SOLUTION ORAL at 06:29

## 2022-01-31 RX ADMIN — DEXMEDETOMIDINE HYDROCHLORIDE 1.5 MCG/KG/HR: 4 INJECTION, SOLUTION INTRAVENOUS at 22:52

## 2022-01-31 RX ADMIN — PROPOFOL 70 MCG/KG/MIN: 10 INJECTION, EMULSION INTRAVENOUS at 13:14

## 2022-01-31 RX ADMIN — DEXTROMETHORPHAN 60 MG: 30 SUSPENSION, EXTENDED RELEASE ORAL at 22:53

## 2022-01-31 RX ADMIN — ERYTHROMYCIN ETHYLSUCCINATE 100 MG: 200 GRANULE, FOR SUSPENSION ORAL at 18:09

## 2022-01-31 RX ADMIN — DEXMEDETOMIDINE HYDROCHLORIDE 1.4 MCG/KG/HR: 4 INJECTION, SOLUTION INTRAVENOUS at 10:28

## 2022-01-31 RX ADMIN — PROPOFOL 70 MCG/KG/MIN: 10 INJECTION, EMULSION INTRAVENOUS at 10:28

## 2022-01-31 RX ADMIN — ENOXAPARIN SODIUM 100 MG: 100 INJECTION SUBCUTANEOUS at 01:27

## 2022-01-31 RX ADMIN — ZINC SULFATE 220 MG (50 MG) CAPSULE 220 MG: CAPSULE at 08:47

## 2022-01-31 RX ADMIN — OXYCODONE HYDROCHLORIDE 5 MG: 5 SOLUTION ORAL at 18:01

## 2022-01-31 RX ADMIN — GUAIFENESIN 400 MG: 100 SOLUTION ORAL at 21:20

## 2022-01-31 RX ADMIN — PROPOFOL 75 MCG/KG/MIN: 10 INJECTION, EMULSION INTRAVENOUS at 04:49

## 2022-01-31 RX ADMIN — METOCLOPRAMIDE HYDROCHLORIDE 5 MG: 5 INJECTION INTRAMUSCULAR; INTRAVENOUS at 21:19

## 2022-01-31 RX ADMIN — OXYCODONE HYDROCHLORIDE 5 MG: 5 SOLUTION ORAL at 11:32

## 2022-01-31 RX ADMIN — OXYCODONE HYDROCHLORIDE AND ACETAMINOPHEN 500 MG: 500 TABLET ORAL at 08:47

## 2022-02-01 LAB
ARTERIAL PATENCY WRIST A: POSITIVE
ARTERIAL PATENCY WRIST A: POSITIVE
ATMOSPHERIC PRESS: 751 MMHG
ATMOSPHERIC PRESS: 752 MMHG
BASE EXCESS BLDA CALC-SCNC: 6.2 MMOL/L (ref 0–2)
BASE EXCESS BLDA CALC-SCNC: 7.5 MMOL/L (ref 0–2)
BDY SITE: ABNORMAL
BDY SITE: ABNORMAL
BODY TEMPERATURE: 37 C
BODY TEMPERATURE: 37 C
GLUCOSE BLDC GLUCOMTR-MCNC: 104 MG/DL (ref 70–130)
GLUCOSE BLDC GLUCOMTR-MCNC: 109 MG/DL (ref 70–130)
GLUCOSE BLDC GLUCOMTR-MCNC: 119 MG/DL (ref 70–130)
GLUCOSE BLDC GLUCOMTR-MCNC: 130 MG/DL (ref 70–130)
HCO3 BLDA-SCNC: 30.9 MMOL/L (ref 20–26)
HCO3 BLDA-SCNC: 31.7 MMOL/L (ref 20–26)
INHALED O2 CONCENTRATION: 30 %
INHALED O2 CONCENTRATION: 30 %
Lab: ABNORMAL
Lab: ABNORMAL
MODALITY: ABNORMAL
MODALITY: ABNORMAL
PAW @ PEAK INSP FLOW SETTING VENT: 20 CMH2O
PCO2 BLDA: 38.2 MM HG (ref 35–45)
PCO2 BLDA: 49.1 MM HG (ref 35–45)
PCO2 TEMP ADJ BLD: 38.2 MM HG (ref 35–45)
PCO2 TEMP ADJ BLD: 49.1 MM HG (ref 35–45)
PEEP RESPIRATORY: 5 CM[H2O]
PEEP RESPIRATORY: 5 CM[H2O]
PH BLDA: 7.42 PH UNITS (ref 7.35–7.45)
PH BLDA: 7.52 PH UNITS (ref 7.35–7.45)
PH, TEMP CORRECTED: 7.42 PH UNITS (ref 7.35–7.45)
PH, TEMP CORRECTED: 7.52 PH UNITS (ref 7.35–7.45)
PO2 BLDA: 86.4 MM HG (ref 83–108)
PO2 BLDA: 99.9 MM HG (ref 83–108)
PO2 TEMP ADJ BLD: 86.4 MM HG (ref 83–108)
PO2 TEMP ADJ BLD: 99.9 MM HG (ref 83–108)
SAO2 % BLDCOA: 96.5 % (ref 94–99)
SAO2 % BLDCOA: 98.4 % (ref 94–99)
SET MECH RESP RATE: 18
SET MECH RESP RATE: 18
VENTILATOR MODE: ABNORMAL
VENTILATOR MODE: AC
VT ON VENT VENT: 500 ML

## 2022-02-01 PROCEDURE — 94799 UNLISTED PULMONARY SVC/PX: CPT

## 2022-02-01 PROCEDURE — 94003 VENT MGMT INPAT SUBQ DAY: CPT

## 2022-02-01 PROCEDURE — 25010000002 METOCLOPRAMIDE PER 10 MG: Performed by: INTERNAL MEDICINE

## 2022-02-01 PROCEDURE — 25010000002 PROPOFOL 10 MG/ML EMULSION: Performed by: INTERNAL MEDICINE

## 2022-02-01 PROCEDURE — 99233 SBSQ HOSP IP/OBS HIGH 50: CPT | Performed by: INTERNAL MEDICINE

## 2022-02-01 PROCEDURE — 82962 GLUCOSE BLOOD TEST: CPT

## 2022-02-01 PROCEDURE — 25010000002 MORPHINE SULFATE (PF) 2 MG/ML SOLUTION: Performed by: INTERNAL MEDICINE

## 2022-02-01 PROCEDURE — 25010000002 ENOXAPARIN PER 10 MG: Performed by: INTERNAL MEDICINE

## 2022-02-01 PROCEDURE — 36600 WITHDRAWAL OF ARTERIAL BLOOD: CPT

## 2022-02-01 PROCEDURE — 25010000002 MAGNESIUM SULFATE PER 500 MG OF MAGNESIUM: Performed by: INTERNAL MEDICINE

## 2022-02-01 PROCEDURE — 82803 BLOOD GASES ANY COMBINATION: CPT

## 2022-02-01 PROCEDURE — 25010000002 CALCIUM GLUCONATE PER 10 ML: Performed by: INTERNAL MEDICINE

## 2022-02-01 RX ORDER — MORPHINE SULFATE 2 MG/ML
2 INJECTION, SOLUTION INTRAMUSCULAR; INTRAVENOUS EVERY 6 HOURS PRN
Status: DISCONTINUED | OUTPATIENT
Start: 2022-02-01 | End: 2022-02-07

## 2022-02-01 RX ADMIN — MORPHINE SULFATE 2 MG: 2 INJECTION, SOLUTION INTRAMUSCULAR; INTRAVENOUS at 10:23

## 2022-02-01 RX ADMIN — MORPHINE SULFATE 2 MG: 2 INJECTION, SOLUTION INTRAMUSCULAR; INTRAVENOUS at 20:23

## 2022-02-01 RX ADMIN — POTASSIUM PHOSPHATE, MONOBASIC POTASSIUM PHOSPHATE, DIBASIC: 224; 236 INJECTION, SOLUTION, CONCENTRATE INTRAVENOUS at 17:37

## 2022-02-01 RX ADMIN — DEXMEDETOMIDINE HYDROCHLORIDE 1.3 MCG/KG/HR: 4 INJECTION, SOLUTION INTRAVENOUS at 11:36

## 2022-02-01 RX ADMIN — OXYCODONE HYDROCHLORIDE 5 MG: 5 SOLUTION ORAL at 00:56

## 2022-02-01 RX ADMIN — DEXMEDETOMIDINE HYDROCHLORIDE 1.5 MCG/KG/HR: 4 INJECTION, SOLUTION INTRAVENOUS at 04:53

## 2022-02-01 RX ADMIN — PROPOFOL 60 MCG/KG/MIN: 10 INJECTION, EMULSION INTRAVENOUS at 07:55

## 2022-02-01 RX ADMIN — DOCUSATE SODIUM 50 MG AND SENNOSIDES 8.6 MG 1 TABLET: 8.6; 5 TABLET, FILM COATED ORAL at 20:23

## 2022-02-01 RX ADMIN — ZINC SULFATE 220 MG (50 MG) CAPSULE 220 MG: CAPSULE at 08:49

## 2022-02-01 RX ADMIN — DEXMEDETOMIDINE HYDROCHLORIDE 1.2 MCG/KG/HR: 4 INJECTION, SOLUTION INTRAVENOUS at 07:55

## 2022-02-01 RX ADMIN — ALBUTEROL SULFATE 2.5 MG: 2.5 SOLUTION RESPIRATORY (INHALATION) at 18:42

## 2022-02-01 RX ADMIN — THIAMINE HCL TAB 100 MG 100 MG: 100 TAB at 08:49

## 2022-02-01 RX ADMIN — SODIUM CHLORIDE, PRESERVATIVE FREE 10 ML: 5 INJECTION INTRAVENOUS at 21:26

## 2022-02-01 RX ADMIN — METOPROLOL TARTRATE 2.5 MG: 1 INJECTION, SOLUTION INTRAVENOUS at 18:02

## 2022-02-01 RX ADMIN — PROPOFOL 55 MCG/KG/MIN: 10 INJECTION, EMULSION INTRAVENOUS at 21:26

## 2022-02-01 RX ADMIN — BUDESONIDE AND FORMOTEROL FUMARATE DIHYDRATE 2 PUFF: 160; 4.5 AEROSOL RESPIRATORY (INHALATION) at 18:42

## 2022-02-01 RX ADMIN — PROPOFOL 70 MCG/KG/MIN: 10 INJECTION, EMULSION INTRAVENOUS at 01:47

## 2022-02-01 RX ADMIN — ALBUTEROL SULFATE 2.5 MG: 2.5 SOLUTION RESPIRATORY (INHALATION) at 06:33

## 2022-02-01 RX ADMIN — PROPOFOL 55 MCG/KG/MIN: 10 INJECTION, EMULSION INTRAVENOUS at 11:36

## 2022-02-01 RX ADMIN — POLYETHYLENE GLYCOL 3350 17 G: 17 POWDER, FOR SOLUTION ORAL at 08:49

## 2022-02-01 RX ADMIN — ERYTHROMYCIN ETHYLSUCCINATE 100 MG: 200 GRANULE, FOR SUSPENSION ORAL at 09:56

## 2022-02-01 RX ADMIN — DEXMEDETOMIDINE HYDROCHLORIDE 1.3 MCG/KG/HR: 4 INJECTION, SOLUTION INTRAVENOUS at 18:02

## 2022-02-01 RX ADMIN — METOPROLOL TARTRATE 2.5 MG: 1 INJECTION, SOLUTION INTRAVENOUS at 11:36

## 2022-02-01 RX ADMIN — GUAIFENESIN 400 MG: 100 SOLUTION ORAL at 05:06

## 2022-02-01 RX ADMIN — DEXMEDETOMIDINE HYDROCHLORIDE 1.3 MCG/KG/HR: 4 INJECTION, SOLUTION INTRAVENOUS at 14:48

## 2022-02-01 RX ADMIN — PROPOFOL 55 MCG/KG/MIN: 10 INJECTION, EMULSION INTRAVENOUS at 18:02

## 2022-02-01 RX ADMIN — PROPOFOL 55 MCG/KG/MIN: 10 INJECTION, EMULSION INTRAVENOUS at 14:48

## 2022-02-01 RX ADMIN — DEXMEDETOMIDINE HYDROCHLORIDE 1.4 MCG/KG/HR: 4 INJECTION, SOLUTION INTRAVENOUS at 01:46

## 2022-02-01 RX ADMIN — GUAIFENESIN 400 MG: 100 SOLUTION ORAL at 21:26

## 2022-02-01 RX ADMIN — ALBUTEROL SULFATE 2.5 MG: 2.5 SOLUTION RESPIRATORY (INHALATION) at 12:30

## 2022-02-01 RX ADMIN — PANTOPRAZOLE SODIUM 40 MG: 40 INJECTION, POWDER, FOR SOLUTION INTRAVENOUS at 20:24

## 2022-02-01 RX ADMIN — METOPROLOL TARTRATE 2.5 MG: 1 INJECTION, SOLUTION INTRAVENOUS at 00:56

## 2022-02-01 RX ADMIN — PROPOFOL 75 MCG/KG/MIN: 10 INJECTION, EMULSION INTRAVENOUS at 05:13

## 2022-02-01 RX ADMIN — DEXMEDETOMIDINE HYDROCHLORIDE 1.3 MCG/KG/HR: 4 INJECTION, SOLUTION INTRAVENOUS at 21:29

## 2022-02-01 RX ADMIN — ENOXAPARIN SODIUM 100 MG: 100 INJECTION SUBCUTANEOUS at 00:56

## 2022-02-01 RX ADMIN — OXYCODONE HYDROCHLORIDE AND ACETAMINOPHEN 500 MG: 500 TABLET ORAL at 08:49

## 2022-02-01 RX ADMIN — TERAZOSIN HYDROCHLORIDE 1 MG: 1 CAPSULE ORAL at 20:23

## 2022-02-01 RX ADMIN — Medication 1000 UNITS: at 08:49

## 2022-02-01 RX ADMIN — OXYCODONE HYDROCHLORIDE 5 MG: 5 SOLUTION ORAL at 17:37

## 2022-02-01 RX ADMIN — METOPROLOL TARTRATE 2.5 MG: 1 INJECTION, SOLUTION INTRAVENOUS at 05:13

## 2022-02-01 RX ADMIN — PROPOFOL 75 MCG/KG/MIN: 10 INJECTION, EMULSION INTRAVENOUS at 03:23

## 2022-02-01 RX ADMIN — BUDESONIDE AND FORMOTEROL FUMARATE DIHYDRATE 2 PUFF: 160; 4.5 AEROSOL RESPIRATORY (INHALATION) at 06:33

## 2022-02-01 RX ADMIN — OXYCODONE HYDROCHLORIDE 5 MG: 5 SOLUTION ORAL at 05:06

## 2022-02-01 RX ADMIN — DOCUSATE SODIUM 50 MG AND SENNOSIDES 8.6 MG 1 TABLET: 8.6; 5 TABLET, FILM COATED ORAL at 08:49

## 2022-02-01 RX ADMIN — PANTOPRAZOLE SODIUM 40 MG: 40 INJECTION, POWDER, FOR SOLUTION INTRAVENOUS at 08:49

## 2022-02-01 RX ADMIN — METOCLOPRAMIDE HYDROCHLORIDE 5 MG: 5 INJECTION INTRAMUSCULAR; INTRAVENOUS at 15:37

## 2022-02-01 RX ADMIN — OXYCODONE HYDROCHLORIDE 5 MG: 5 SOLUTION ORAL at 11:36

## 2022-02-01 RX ADMIN — METOCLOPRAMIDE HYDROCHLORIDE 5 MG: 5 INJECTION INTRAMUSCULAR; INTRAVENOUS at 20:23

## 2022-02-01 RX ADMIN — ERYTHROMYCIN ETHYLSUCCINATE 100 MG: 200 GRANULE, FOR SUSPENSION ORAL at 19:51

## 2022-02-01 RX ADMIN — ENOXAPARIN SODIUM 100 MG: 100 INJECTION SUBCUTANEOUS at 11:36

## 2022-02-01 RX ADMIN — GUAIFENESIN 400 MG: 100 SOLUTION ORAL at 14:45

## 2022-02-01 RX ADMIN — METOCLOPRAMIDE HYDROCHLORIDE 5 MG: 5 INJECTION INTRAMUSCULAR; INTRAVENOUS at 08:49

## 2022-02-01 RX ADMIN — ATORVASTATIN CALCIUM 20 MG: 10 TABLET, FILM COATED ORAL at 20:22

## 2022-02-01 NOTE — PLAN OF CARE
VSS. Attempted to wean sedation with minimal reduction. PRN Morphine given once with no improvement in symptoms. Gastric residual still high this shift. Turned q2h.

## 2022-02-01 NOTE — PROGRESS NOTES
PULMONARY AND CRITICAL CARE PROGRESS NOTE - [facility]    Patient: Eddy Ferro  1970   MR# [id number]   Acct# [id number]  02/01/22   09:23 CST  Referring Provider: Bhupinder Moss*    Chief Complaint: Acute respiratory failure.  Mechanical ventilation.  COVID-19 pneumonia  Interval history: Patient is out of Covid isolation. He remains mechanically ventilated and sedated on propofol and Precedex. Oxygen saturation 97% on PEEP of 5 and FiO2 0.3.  ET CO2 34.  Flow dyssynchrony observed.  Plateau pressure 18.  ABG reviewed and stable.  Afebrile.  No new issues reported overnight.      Meds:  albuterol, 2.5 mg, Nebulization, Q6H - RT  ascorbic acid, 500 mg, Per PEG Tube, Daily  atorvastatin, 20 mg, Per PEG Tube, Nightly  budesonide-formoterol, 2 puff, Inhalation, BID - RT  cholecalciferol, 1,000 Units, Per PEG Tube, Daily  enoxaparin, 1 mg/kg, Subcutaneous, Q12H  erythromycin ethylsuccinate, 100 mg, Per G Tube, BID With Meals  guaiFENesin, 400 mg, Per PEG Tube, Q8H  metoclopramide, 5 mg, Intravenous, TID  metoprolol tartrate, 2.5 mg, Intravenous, Q6H  oxyCODONE, 5 mg, Per PEG Tube, Q6H  pantoprazole, 40 mg, Intravenous, Q12H  polyethylene glycol, 17 g, Per PEG Tube, Daily  senna-docusate sodium, 1 tablet, Per PEG Tube, BID  terazosin, 1 mg, Per PEG Tube, Nightly  thiamine, 100 mg, Per PEG Tube, Daily  zinc sulfate, 220 mg, Per PEG Tube, Daily      Adult Custom Central TPN, , Last Rate: 80 mL/hr at 01/30/22 1730  Adult Custom Central TPN, , Last Rate: 80 mL/hr at 01/31/22 1801  dexmedetomidine, 0.2-1.5 mcg/kg/hr, Last Rate: 1 mcg/kg/hr (02/01/22 0900)  propofol, 5-75 mcg/kg/min, Last Rate: 45 mcg/kg/min (02/01/22 0900)      Review of Systems:   Review of Systems   Unable to perform ROS: Intubated     Physical Exam:  SpO2 Percentage    02/01/22 0800 02/01/22 0830 02/01/22 0900   SpO2: 97% 95% 96%     Body mass index is 30.62 kg/m².   Temp:  [96.9 °F (36.1 °C)-98.9 °F (37.2 °C)] 98.4 °F  (36.9 °C)  Heart Rate:  [] 82  Resp:  [18-40] 18  BP: ()/() 96/61  FiO2 (%):  [30 %] 30 %    Intake/Output Summary (Last 24 hours) at 2/1/2022 0923  Last data filed at 2/1/2022 0733  Gross per 24 hour   Intake 3738.2 ml   Output 4375 ml   Net -636.8 ml     Physical Exam  Constitutional:       General: He is not in acute distress.     Appearance: He is ill-appearing. He is not diaphoretic.      Interventions: He is sedated.   HENT:      Head: Normocephalic.      Nose: Nose normal.      Mouth/Throat:      Mouth: Mucous membranes are moist.   Eyes:      General: No scleral icterus.  Neck:      Comments: Vent to trach  Cardiovascular:      Rate and Rhythm: Normal rate and regular rhythm.      Comments: Rate 92  Pulmonary:      Effort: Pulmonary effort is normal. No respiratory distress.      Breath sounds: Rhonchi present. No wheezing.   Abdominal:      General: There is no distension.      Comments: PEG   Musculoskeletal:      Right lower leg: No edema.      Left lower leg: No edema.   Skin:     Coloration: Skin is not pale.   Neurological:      Comments: Sedated        Electronically signed by SHAHNAZ Cabrera, 2/1/2022, 09:24 CST      Physician Substantive Portion: Medical Decision Making:    Laboratory Data:  Results from last 7 days   Lab Units 01/31/22  0140 01/30/22  0841 01/29/22  0315   WBC 10*3/mm3 7.54 8.83 9.43   HEMOGLOBIN g/dL 10.1* 10.0* 9.7*   PLATELETS 10*3/mm3 223 237 244     Results from last 7 days   Lab Units 01/31/22  0140 01/29/22  0315 01/28/22  0242 01/27/22  1140 01/27/22  0311   SODIUM mmol/L 138 137 137  --    < >   POTASSIUM mmol/L 4.0 3.8 3.9  --    < >   BUN mg/dL 17 17 13  --    < >   CREATININE mg/dL 0.31* 0.34* 0.39*  --    < >   INR   --   --   --  1.16*  --     < > = values in this interval not displayed.     Results from last 7 days   Lab Units 02/01/22  0256 01/31/22  0318 01/30/22  0255   PH, ARTERIAL pH units 7.419 7.465* 7.461*   PCO2, ARTERIAL mm Hg 49.1*  45.3* 46.5*   PO2 ART mm Hg 86.4 81.2* 78.2*   FIO2 % 30 30 30     No results found for: BLOODCX, URINECX, WOUNDCX, MRSACX, RESPCX, STOOLCX  Recent films:  No radiology results for the last day  Films reviewed personally by me.       My radiograph interpretation/independent review of imaging: No x-ray today    Pulmonary Assessment:    1. Acute respiratory failure due to Covid with hypoxia  2. Covid pneumonia, status post Remdesivir, baricitinib  3. Tracheostomy status  4. Status post pulmonary embolus, stable  5. Patient ventilator dyssynchrony  6. Moderate anemia stable    Recommend/plan:   · We switched to pressure control ventilation to improve synchrony  · Discussed with nursing.  Gastric residual still high.  Hopefully he can get changed back over to enteral nutrition at some point on off of TPN.  High risk for sepsis related to that given his presenting problems and treatments  · Not ready for spontaneous breathing trials  · Continue DVT and stress ulcer prophylaxis  · Try to minimize sedation      This visit was performed by both a physician and an Advanced Practice RN.  I personally evaluated and examined the patient.  I performed all aspects of the medical decision making as documented.  Electronically signed by Mario Hong MD, 2/1/2022, 10:05 CST

## 2022-02-01 NOTE — SIGNIFICANT NOTE
02/01/22 0804   Readings   Plateau Pressure (cm H2O) 18 cm H2O   Static Compliance (L/cm H2O) 32   Dynamic Compliance (L/cm H2O) 39 L/cm H2O

## 2022-02-01 NOTE — PLAN OF CARE
Goal Outcome Evaluation:  Plan of Care Reviewed With: patient, significant other        Progress: improving  Outcome Summary: Pt was mostly stable overnight. Residuals continued to be high (320, 370, and 550). Pt was more alert and looking around through the shift, but he did not follow commands. At several points, he seemed to be in pain, based on facial expression, crying, and elevated heart rates (120's), that came back down to normal when his scheduled pain meds were administered. Respiratory rate continued to be elevated in the 30's all night. He had some dry skin on his buttock area. Orange cream was applied twice. Urine output was good at 2675. Nothing else of note to report.

## 2022-02-01 NOTE — PROGRESS NOTES
1           [facility] Medicine Services  INPATIENT PROGRESS NOTE    Patient Name: Eddy Ferro  Date of Admission: 1/11/2022  Today's Date: 02/01/22  Length of Stay: 21  Primary Care Physician: Provider, No Known    Subjective   Chief Complaint: Follow-up  HPI   Nurse Josesito and NP Gee with pulmonary at bedside  Vent setting has been revised per pulmonary  Still continuing to have significant amount of residual overnight despite cutting back on Reglan and adding erythromycin as per recommendation by GI service.  He has been on TPN for nutritional support for over a week now.  Patient appeared to have been in pain based on his facial expression and elevated heart rate.  Patient has round-the-clock Roxicodone.  As per pulmonary, no need for significant amount of sedation.  We will add morphine 2 mg every 4 hourly as needed for breakthrough pain  Patient has good urine output  Social service following.  Patient has been referred to [facility].  Currently no bed available.    Review of Systems   Patient unable to participate in his care due to medical condition    Objective    Temp:  [96.9 °F (36.1 °C)-98.9 °F (37.2 °C)] 98.9 °F (37.2 °C)  Heart Rate:  [] 77  Resp:  [18-40] 18  BP: ()/() 110/71  FiO2 (%):  [30 %] 30 %  Physical Exam  On TPN  Propofol running  HEENT:  Atraumatic, Anicteric, Trachea midline, + trach, minimal secretions from tracheostomy noted  Lungs: equal chest rise bilaterally, no vent desynchrony   Heart: Regular rate and rhythm, no gross murmur.  Maintaining sinus rhythm on telemetry  ABD: Soft, + PEG, no drainage  Moss catheter in place  PICC line on the left arm  Extremities: no cyanosis, no edema  Skin: no obvious rashes or petechiae  Neuro: Unable to assess at this time due to sedation  Psych: Unable to assess at this time due to sedation         Results Review:  I have reviewed the labs, radiology results, and diagnostic  studies.    Laboratory Data:   Results from last 7 days   Lab Units 01/31/22  0140 01/30/22  0841 01/29/22  0315   WBC 10*3/mm3 7.54 8.83 9.43   HEMOGLOBIN g/dL 10.1* 10.0* 9.7*   HEMATOCRIT % 34.4* 32.5* 32.2*   PLATELETS 10*3/mm3 223 237 244        Results from last 7 days   Lab Units 01/31/22  0140 01/29/22  0315 01/28/22  0242   SODIUM mmol/L 138 137 137   POTASSIUM mmol/L 4.0 3.8 3.9   CHLORIDE mmol/L 101 101 101   CO2 mmol/L 30.0* 31.0* 31.0*   BUN mg/dL 17 17 13   CREATININE mg/dL 0.31* 0.34* 0.39*   CALCIUM mg/dL 8.3* 8.2* 8.4*   GLUCOSE mg/dL 108* 123* 123*       Culture Data:   No results found for: BLOODCX, URINECX, WOUNDCX, MRSACX, RESPCX, STOOLCX    Radiology Data:   Imaging Results (Last 24 Hours)     ** No results found for the last 24 hours. **          I have reviewed the patient's current medications.     Assessment/Plan     Active Hospital Problems    Diagnosis    • **Pneumonia due to COVID-19 virus    • Severe malnutrition (CMS/HCC)    • Hx of tracheostomy    • Ventilator dependent (HCC)    • Acute hypoxemic respiratory failure (HCC)    • Acute respiratory distress syndrome (ARDS) due to 2019 novel coronavirus (HCC)    • Obesity (BMI 30-39.9)    • Acute subsegmental pulmonary embolism without acute cor pulmonale (HCC)        Problem list  Acute hypoxic respiratory failure in the setting of Covid, PE, Klebsiella pneumonia  PE -on Lovenox  COVID-19  Nutrition on TPN due to intolerance with retention  Urinary retention  SVT       Plan:  Status post trach and PEG January 28  Had completed Decadron  Had received baricitinib, Remdesivir in outpatient setting  Had completed 7 days of ceftriaxone prior to all other antibiotics received from transferring facility.  Now off isolation  TPN until able to tolerate tube feeding.  Erythromycin added on January 31.  Still has received well  Moss catheter for urinary retention, Hytrin  Lopressor IV  Awaiting LTAC pre-CERT.  Discussed with pulmonary and nurse  Josesito at bedside      albuterol, 2.5 mg, Nebulization, Q6H - RT  ascorbic acid, 500 mg, Per PEG Tube, Daily  atorvastatin, 20 mg, Per PEG Tube, Nightly  budesonide-formoterol, 2 puff, Inhalation, BID - RT  cholecalciferol, 1,000 Units, Per PEG Tube, Daily  enoxaparin, 1 mg/kg, Subcutaneous, Q12H  erythromycin ethylsuccinate, 100 mg, Per G Tube, BID With Meals  guaiFENesin, 400 mg, Per PEG Tube, Q8H  metoclopramide, 5 mg, Intravenous, TID  metoprolol tartrate, 2.5 mg, Intravenous, Q6H  oxyCODONE, 5 mg, Per PEG Tube, Q6H  pantoprazole, 40 mg, Intravenous, Q12H  polyethylene glycol, 17 g, Per PEG Tube, Daily  senna-docusate sodium, 1 tablet, Per PEG Tube, BID  terazosin, 1 mg, Per PEG Tube, Nightly  thiamine, 100 mg, Per PEG Tube, Daily  zinc sulfate, 220 mg, Per PEG Tube, Daily            Discharge Planning:?    Electronically signed by Bhupinder Moss MD, 02/01/22, 07:31 CST.

## 2022-02-02 ENCOUNTER — APPOINTMENT (OUTPATIENT)
Dept: GENERAL RADIOLOGY | Facility: HOSPITAL | Age: 52
End: 2022-02-02

## 2022-02-02 LAB
ALBUMIN SERPL-MCNC: 2.3 G/DL (ref 3.5–5.2)
ALBUMIN/GLOB SERPL: 0.5 G/DL
ALP SERPL-CCNC: 148 U/L (ref 39–117)
ALT SERPL W P-5'-P-CCNC: 39 U/L (ref 1–41)
ANION GAP SERPL CALCULATED.3IONS-SCNC: 8 MMOL/L (ref 5–15)
ARTERIAL PATENCY WRIST A: POSITIVE
AST SERPL-CCNC: 45 U/L (ref 1–40)
ATMOSPHERIC PRESS: 749 MMHG
BASE EXCESS BLDA CALC-SCNC: 6.2 MMOL/L (ref 0–2)
BASOPHILS # BLD AUTO: 0.08 10*3/MM3 (ref 0–0.2)
BASOPHILS NFR BLD AUTO: 1 % (ref 0–1.5)
BDY SITE: ABNORMAL
BILIRUB SERPL-MCNC: 1.2 MG/DL (ref 0–1.2)
BODY TEMPERATURE: 37 C
BUN SERPL-MCNC: 23 MG/DL (ref 6–20)
BUN/CREAT SERPL: 54.8 (ref 7–25)
CALCIUM SPEC-SCNC: 8.1 MG/DL (ref 8.6–10.5)
CHLORIDE SERPL-SCNC: 99 MMOL/L (ref 98–107)
CO2 SERPL-SCNC: 29 MMOL/L (ref 22–29)
CREAT SERPL-MCNC: 0.42 MG/DL (ref 0.76–1.27)
DEPRECATED RDW RBC AUTO: 55 FL (ref 37–54)
EOSINOPHIL # BLD AUTO: 1.38 10*3/MM3 (ref 0–0.4)
EOSINOPHIL NFR BLD AUTO: 16.7 % (ref 0.3–6.2)
ERYTHROCYTE [DISTWIDTH] IN BLOOD BY AUTOMATED COUNT: 18.6 % (ref 12.3–15.4)
GFR SERPL CREATININE-BSD FRML MDRD: >150 ML/MIN/1.73
GLOBULIN UR ELPH-MCNC: 4.5 GM/DL
GLUCOSE BLDC GLUCOMTR-MCNC: 95 MG/DL (ref 70–130)
GLUCOSE BLDC GLUCOMTR-MCNC: 98 MG/DL (ref 70–130)
GLUCOSE SERPL-MCNC: 114 MG/DL (ref 65–99)
HCO3 BLDA-SCNC: 30.1 MMOL/L (ref 20–26)
HCT VFR BLD AUTO: 33 % (ref 37.5–51)
HGB BLD-MCNC: 10 G/DL (ref 13–17.7)
IMM GRANULOCYTES # BLD AUTO: 0.13 10*3/MM3 (ref 0–0.05)
IMM GRANULOCYTES NFR BLD AUTO: 1.6 % (ref 0–0.5)
INHALED O2 CONCENTRATION: 30 %
LYMPHOCYTES # BLD AUTO: 2.48 10*3/MM3 (ref 0.7–3.1)
LYMPHOCYTES NFR BLD AUTO: 30 % (ref 19.6–45.3)
Lab: ABNORMAL
MAGNESIUM SERPL-MCNC: 2 MG/DL (ref 1.6–2.6)
MCH RBC QN AUTO: 25.2 PG (ref 26.6–33)
MCHC RBC AUTO-ENTMCNC: 30.3 G/DL (ref 31.5–35.7)
MCV RBC AUTO: 83.1 FL (ref 79–97)
MODALITY: ABNORMAL
MONOCYTES # BLD AUTO: 0.79 10*3/MM3 (ref 0.1–0.9)
MONOCYTES NFR BLD AUTO: 9.5 % (ref 5–12)
NEUTROPHILS NFR BLD AUTO: 3.42 10*3/MM3 (ref 1.7–7)
NEUTROPHILS NFR BLD AUTO: 41.2 % (ref 42.7–76)
NRBC BLD AUTO-RTO: 0 /100 WBC (ref 0–0.2)
PAW @ PEAK INSP FLOW SETTING VENT: 18 CMH2O
PCO2 BLDA: 39.7 MM HG (ref 35–45)
PCO2 TEMP ADJ BLD: 39.7 MM HG (ref 35–45)
PEEP RESPIRATORY: 5 CM[H2O]
PH BLDA: 7.49 PH UNITS (ref 7.35–7.45)
PH, TEMP CORRECTED: 7.49 PH UNITS (ref 7.35–7.45)
PLATELET # BLD AUTO: 196 10*3/MM3 (ref 140–450)
PMV BLD AUTO: 12.7 FL (ref 6–12)
PO2 BLDA: 78.8 MM HG (ref 83–108)
PO2 TEMP ADJ BLD: 78.8 MM HG (ref 83–108)
POTASSIUM SERPL-SCNC: 3.9 MMOL/L (ref 3.5–5.2)
PROT SERPL-MCNC: 6.8 G/DL (ref 6–8.5)
RBC # BLD AUTO: 3.97 10*6/MM3 (ref 4.14–5.8)
SAO2 % BLDCOA: 96.4 % (ref 94–99)
SET MECH RESP RATE: 14
SODIUM SERPL-SCNC: 136 MMOL/L (ref 136–145)
VENTILATOR MODE: ABNORMAL
WBC NRBC COR # BLD: 8.28 10*3/MM3 (ref 3.4–10.8)

## 2022-02-02 PROCEDURE — 25010000002 LORAZEPAM PER 2 MG: Performed by: NURSE PRACTITIONER

## 2022-02-02 PROCEDURE — 82962 GLUCOSE BLOOD TEST: CPT

## 2022-02-02 PROCEDURE — 94799 UNLISTED PULMONARY SVC/PX: CPT

## 2022-02-02 PROCEDURE — 0 POTASSIUM CHLORIDE 10 MEQ/100ML SOLUTION: Performed by: INTERNAL MEDICINE

## 2022-02-02 PROCEDURE — 80053 COMPREHEN METABOLIC PANEL: CPT | Performed by: INTERNAL MEDICINE

## 2022-02-02 PROCEDURE — 82803 BLOOD GASES ANY COMBINATION: CPT

## 2022-02-02 PROCEDURE — 36600 WITHDRAWAL OF ARTERIAL BLOOD: CPT

## 2022-02-02 PROCEDURE — 25010000002 ENOXAPARIN PER 10 MG: Performed by: INTERNAL MEDICINE

## 2022-02-02 PROCEDURE — 94003 VENT MGMT INPAT SUBQ DAY: CPT

## 2022-02-02 PROCEDURE — 83735 ASSAY OF MAGNESIUM: CPT | Performed by: INTERNAL MEDICINE

## 2022-02-02 PROCEDURE — 25010000002 METOCLOPRAMIDE PER 10 MG: Performed by: INTERNAL MEDICINE

## 2022-02-02 PROCEDURE — 25010000002 CALCIUM GLUCONATE PER 10 ML: Performed by: INTERNAL MEDICINE

## 2022-02-02 PROCEDURE — 25010000002 MORPHINE SULFATE (PF) 2 MG/ML SOLUTION: Performed by: INTERNAL MEDICINE

## 2022-02-02 PROCEDURE — 25010000002 PROPOFOL 10 MG/ML EMULSION: Performed by: INTERNAL MEDICINE

## 2022-02-02 PROCEDURE — 99233 SBSQ HOSP IP/OBS HIGH 50: CPT | Performed by: INTERNAL MEDICINE

## 2022-02-02 PROCEDURE — 25010000002 POTASSIUM CHLORIDE PER 2 MEQ OF POTASSIUM: Performed by: INTERNAL MEDICINE

## 2022-02-02 PROCEDURE — 85025 COMPLETE CBC W/AUTO DIFF WBC: CPT | Performed by: INTERNAL MEDICINE

## 2022-02-02 PROCEDURE — 74018 RADEX ABDOMEN 1 VIEW: CPT

## 2022-02-02 PROCEDURE — 25010000002 MAGNESIUM SULFATE PER 500 MG OF MAGNESIUM: Performed by: INTERNAL MEDICINE

## 2022-02-02 RX ORDER — METOPROLOL TARTRATE 5 MG/5ML
5 INJECTION INTRAVENOUS EVERY 6 HOURS SCHEDULED
Status: DISCONTINUED | OUTPATIENT
Start: 2022-02-02 | End: 2022-02-03

## 2022-02-02 RX ORDER — DEXMEDETOMIDINE HYDROCHLORIDE 4 UG/ML
.2-1.5 INJECTION, SOLUTION INTRAVENOUS
Status: DISCONTINUED | OUTPATIENT
Start: 2022-02-02 | End: 2022-02-09 | Stop reason: HOSPADM

## 2022-02-02 RX ORDER — DIPHENHYDRAMINE HCL 12.5MG/5ML
12.5 LIQUID (ML) ORAL EVERY 6 HOURS SCHEDULED
Status: DISCONTINUED | OUTPATIENT
Start: 2022-02-02 | End: 2022-02-09 | Stop reason: HOSPADM

## 2022-02-02 RX ORDER — LORAZEPAM 2 MG/ML
2 INJECTION INTRAMUSCULAR EVERY 4 HOURS PRN
Status: DISCONTINUED | OUTPATIENT
Start: 2022-02-02 | End: 2022-02-07

## 2022-02-02 RX ORDER — METOPROLOL TARTRATE 5 MG/5ML
2.5 INJECTION INTRAVENOUS ONCE
Status: COMPLETED | OUTPATIENT
Start: 2022-02-02 | End: 2022-02-02

## 2022-02-02 RX ORDER — POTASSIUM CHLORIDE 7.45 MG/ML
10 INJECTION INTRAVENOUS ONCE
Status: COMPLETED | OUTPATIENT
Start: 2022-02-02 | End: 2022-02-02

## 2022-02-02 RX ADMIN — GUAIFENESIN 400 MG: 100 SOLUTION ORAL at 14:59

## 2022-02-02 RX ADMIN — TERAZOSIN HYDROCHLORIDE 1 MG: 1 CAPSULE ORAL at 20:33

## 2022-02-02 RX ADMIN — THIAMINE HCL TAB 100 MG 100 MG: 100 TAB at 09:33

## 2022-02-02 RX ADMIN — ALBUTEROL SULFATE 2.5 MG: 2.5 SOLUTION RESPIRATORY (INHALATION) at 23:52

## 2022-02-02 RX ADMIN — LORAZEPAM 2 MG: 2 INJECTION INTRAMUSCULAR; INTRAVENOUS at 16:09

## 2022-02-02 RX ADMIN — PANTOPRAZOLE SODIUM 40 MG: 40 INJECTION, POWDER, FOR SOLUTION INTRAVENOUS at 20:38

## 2022-02-02 RX ADMIN — POTASSIUM PHOSPHATE, MONOBASIC POTASSIUM PHOSPHATE, DIBASIC: 224; 236 INJECTION, SOLUTION, CONCENTRATE INTRAVENOUS at 17:27

## 2022-02-02 RX ADMIN — METOPROLOL TARTRATE 2.5 MG: 1 INJECTION, SOLUTION INTRAVENOUS at 12:06

## 2022-02-02 RX ADMIN — DOCUSATE SODIUM 50 MG AND SENNOSIDES 8.6 MG 1 TABLET: 8.6; 5 TABLET, FILM COATED ORAL at 09:33

## 2022-02-02 RX ADMIN — METOPROLOL TARTRATE 5 MG: 1 INJECTION, SOLUTION INTRAVENOUS at 17:06

## 2022-02-02 RX ADMIN — ALBUTEROL SULFATE 2.5 MG: 2.5 SOLUTION RESPIRATORY (INHALATION) at 18:12

## 2022-02-02 RX ADMIN — DEXMEDETOMIDINE HYDROCHLORIDE 0.5 MCG/KG/HR: 4 INJECTION, SOLUTION INTRAVENOUS at 23:57

## 2022-02-02 RX ADMIN — METOCLOPRAMIDE HYDROCHLORIDE 5 MG: 5 INJECTION INTRAMUSCULAR; INTRAVENOUS at 09:30

## 2022-02-02 RX ADMIN — ATORVASTATIN CALCIUM 20 MG: 10 TABLET, FILM COATED ORAL at 20:32

## 2022-02-02 RX ADMIN — LORAZEPAM 2 MG: 2 INJECTION INTRAMUSCULAR; INTRAVENOUS at 23:00

## 2022-02-02 RX ADMIN — ERYTHROMYCIN ETHYLSUCCINATE 100 MG: 200 GRANULE, FOR SUSPENSION ORAL at 10:00

## 2022-02-02 RX ADMIN — BUDESONIDE AND FORMOTEROL FUMARATE DIHYDRATE 2 PUFF: 160; 4.5 AEROSOL RESPIRATORY (INHALATION) at 06:37

## 2022-02-02 RX ADMIN — DEXMEDETOMIDINE HYDROCHLORIDE 1.3 MCG/KG/HR: 4 INJECTION, SOLUTION INTRAVENOUS at 04:11

## 2022-02-02 RX ADMIN — DEXMEDETOMIDINE HYDROCHLORIDE 1.3 MCG/KG/HR: 4 INJECTION, SOLUTION INTRAVENOUS at 00:38

## 2022-02-02 RX ADMIN — ALBUTEROL SULFATE 2.5 MG: 2.5 SOLUTION RESPIRATORY (INHALATION) at 06:37

## 2022-02-02 RX ADMIN — Medication 1000 UNITS: at 09:33

## 2022-02-02 RX ADMIN — DEXMEDETOMIDINE HYDROCHLORIDE 0.2 MCG/KG/HR: 4 INJECTION, SOLUTION INTRAVENOUS at 17:11

## 2022-02-02 RX ADMIN — DIPHENHYDRAMINE HYDROCHLORIDE 12.5 MG: 12.5 SOLUTION ORAL at 18:48

## 2022-02-02 RX ADMIN — PROPOFOL 55 MCG/KG/MIN: 10 INJECTION, EMULSION INTRAVENOUS at 13:31

## 2022-02-02 RX ADMIN — ENOXAPARIN SODIUM 100 MG: 100 INJECTION SUBCUTANEOUS at 12:05

## 2022-02-02 RX ADMIN — DOCUSATE SODIUM 50 MG AND SENNOSIDES 8.6 MG 1 TABLET: 8.6; 5 TABLET, FILM COATED ORAL at 21:11

## 2022-02-02 RX ADMIN — ALBUTEROL SULFATE 2.5 MG: 2.5 SOLUTION RESPIRATORY (INHALATION) at 00:24

## 2022-02-02 RX ADMIN — OXYCODONE HYDROCHLORIDE 5 MG: 5 SOLUTION ORAL at 06:48

## 2022-02-02 RX ADMIN — POTASSIUM CHLORIDE 10 MEQ: 7.46 INJECTION, SOLUTION INTRAVENOUS at 17:37

## 2022-02-02 RX ADMIN — METOCLOPRAMIDE HYDROCHLORIDE 5 MG: 5 INJECTION INTRAMUSCULAR; INTRAVENOUS at 20:33

## 2022-02-02 RX ADMIN — OXYCODONE HYDROCHLORIDE AND ACETAMINOPHEN 500 MG: 500 TABLET ORAL at 09:33

## 2022-02-02 RX ADMIN — PROPOFOL 70 MCG/KG/MIN: 10 INJECTION, EMULSION INTRAVENOUS at 16:09

## 2022-02-02 RX ADMIN — PROPOFOL 65 MCG/KG/MIN: 10 INJECTION, EMULSION INTRAVENOUS at 21:07

## 2022-02-02 RX ADMIN — POLYETHYLENE GLYCOL 3350 17 G: 17 POWDER, FOR SOLUTION ORAL at 09:33

## 2022-02-02 RX ADMIN — METOPROLOL TARTRATE 2.5 MG: 1 INJECTION, SOLUTION INTRAVENOUS at 06:48

## 2022-02-02 RX ADMIN — OXYCODONE HYDROCHLORIDE 5 MG: 5 SOLUTION ORAL at 00:38

## 2022-02-02 RX ADMIN — METOCLOPRAMIDE HYDROCHLORIDE 5 MG: 5 INJECTION INTRAMUSCULAR; INTRAVENOUS at 15:01

## 2022-02-02 RX ADMIN — PROPOFOL 55 MCG/KG/MIN: 10 INJECTION, EMULSION INTRAVENOUS at 04:11

## 2022-02-02 RX ADMIN — GUAIFENESIN 400 MG: 100 SOLUTION ORAL at 06:48

## 2022-02-02 RX ADMIN — ERYTHROMYCIN ETHYLSUCCINATE 100 MG: 200 GRANULE, FOR SUSPENSION ORAL at 20:32

## 2022-02-02 RX ADMIN — LORAZEPAM 2 MG: 2 INJECTION INTRAMUSCULAR; INTRAVENOUS at 12:03

## 2022-02-02 RX ADMIN — METOPROLOL TARTRATE 2.5 MG: 1 INJECTION, SOLUTION INTRAVENOUS at 00:39

## 2022-02-02 RX ADMIN — BUDESONIDE AND FORMOTEROL FUMARATE DIHYDRATE 2 PUFF: 160; 4.5 AEROSOL RESPIRATORY (INHALATION) at 18:12

## 2022-02-02 RX ADMIN — METOPROLOL TARTRATE 2.5 MG: 1 INJECTION, SOLUTION INTRAVENOUS at 14:39

## 2022-02-02 RX ADMIN — PANTOPRAZOLE SODIUM 40 MG: 40 INJECTION, POWDER, FOR SOLUTION INTRAVENOUS at 09:31

## 2022-02-02 RX ADMIN — MORPHINE SULFATE 2 MG: 2 INJECTION, SOLUTION INTRAMUSCULAR; INTRAVENOUS at 13:49

## 2022-02-02 RX ADMIN — OXYCODONE HYDROCHLORIDE 5 MG: 5 SOLUTION ORAL at 17:08

## 2022-02-02 RX ADMIN — PROPOFOL 65 MCG/KG/MIN: 10 INJECTION, EMULSION INTRAVENOUS at 18:41

## 2022-02-02 RX ADMIN — PROPOFOL 55 MCG/KG/MIN: 10 INJECTION, EMULSION INTRAVENOUS at 11:00

## 2022-02-02 RX ADMIN — PROPOFOL 55 MCG/KG/MIN: 10 INJECTION, EMULSION INTRAVENOUS at 00:38

## 2022-02-02 RX ADMIN — ALBUTEROL SULFATE 2.5 MG: 2.5 SOLUTION RESPIRATORY (INHALATION) at 12:48

## 2022-02-02 RX ADMIN — OXYCODONE HYDROCHLORIDE 5 MG: 5 SOLUTION ORAL at 13:34

## 2022-02-02 RX ADMIN — PROPOFOL 65 MCG/KG/MIN: 10 INJECTION, EMULSION INTRAVENOUS at 23:57

## 2022-02-02 RX ADMIN — PROPOFOL 55 MCG/KG/MIN: 10 INJECTION, EMULSION INTRAVENOUS at 07:40

## 2022-02-02 RX ADMIN — GUAIFENESIN 400 MG: 100 SOLUTION ORAL at 21:12

## 2022-02-02 RX ADMIN — DEXMEDETOMIDINE HYDROCHLORIDE 1.3 MCG/KG/HR: 4 INJECTION, SOLUTION INTRAVENOUS at 07:40

## 2022-02-02 RX ADMIN — ENOXAPARIN SODIUM 100 MG: 100 INJECTION SUBCUTANEOUS at 00:38

## 2022-02-02 RX ADMIN — ZINC SULFATE 220 MG (50 MG) CAPSULE 220 MG: CAPSULE at 09:33

## 2022-02-02 NOTE — PLAN OF CARE
Goal Outcome Evaluation:          Progress: no change  Outcome Summary: Nutrition follow up. TF continues to be on hold d/t elevated residuals. Pt on custom TPN, increase goal rate from 80 to 90 ml/hr today to optimize kcal and protein. Propofol providing ~789 kcal/d as well. MD may consider alternate prokinetic agent and/or PEG-J tube.

## 2022-02-02 NOTE — PROGRESS NOTES
PULMONARY AND CRITICAL CARE PROGRESS NOTE - [facility]    Patient: Eddy Ferro  1970   MR# [id number]   Acct# [id number]  02/02/22   07:45 CST  Referring Provider: Bhupinder Moss*    Chief Complaint: Acute respiratory failure.  Mechanical ventilation.  COVID-19 pneumonia  Interval history: Patient is out of Covid isolation. He remains mechanically ventilated and sedated on propofol and Precedex.  Oxygen saturation 97% on PEEP of 5 and FiO2 0.3.  Tidal volume 600s.  ET CO2 33.  Minute volume 17 L.  ABG reviewed and stable.  He continues on TPN for nutrition.  Afebrile.  No new issues overnight.      Meds:  albuterol, 2.5 mg, Nebulization, Q6H - RT  ascorbic acid, 500 mg, Per PEG Tube, Daily  atorvastatin, 20 mg, Per PEG Tube, Nightly  budesonide-formoterol, 2 puff, Inhalation, BID - RT  cholecalciferol, 1,000 Units, Per PEG Tube, Daily  enoxaparin, 1 mg/kg, Subcutaneous, Q12H  erythromycin ethylsuccinate, 100 mg, Per G Tube, BID With Meals  guaiFENesin, 400 mg, Per PEG Tube, Q8H  metoclopramide, 5 mg, Intravenous, TID  metoprolol tartrate, 2.5 mg, Intravenous, Q6H  oxyCODONE, 5 mg, Per PEG Tube, Q6H  pantoprazole, 40 mg, Intravenous, Q12H  polyethylene glycol, 17 g, Per PEG Tube, Daily  senna-docusate sodium, 1 tablet, Per PEG Tube, BID  terazosin, 1 mg, Per PEG Tube, Nightly  thiamine, 100 mg, Per PEG Tube, Daily  zinc sulfate, 220 mg, Per PEG Tube, Daily      Adult Custom Central TPN, , Last Rate: 80 mL/hr at 02/01/22 1737  [START ON 2/3/2022] Adult Custom Central TPN,   Adult Custom Central TPN,   dexmedetomidine, 0.2-1.5 mcg/kg/hr, Last Rate: 1.3 mcg/kg/hr (02/02/22 0740)  propofol, 5-75 mcg/kg/min, Last Rate: 55 mcg/kg/min (02/02/22 0740)      Review of Systems:   Review of Systems   Unable to perform ROS: Intubated     Physical Exam:  SpO2 Percentage    02/02/22 0600 02/02/22 0637 02/02/22 0700   SpO2: 98% 98% 97%     Body mass index is 30.15 kg/m².   Temp:  [97.6 °F (36.4  °C)-98.7 °F (37.1 °C)] 97.6 °F (36.4 °C)  Heart Rate:  [] 73  Resp:  [16-28] 18  BP: ()/(58-95) 108/77  FiO2 (%):  [30 %] 30 %    Intake/Output Summary (Last 24 hours) at 2/2/2022 0745  Last data filed at 2/2/2022 0411  Gross per 24 hour   Intake 2812.49 ml   Output 1850 ml   Net 962.49 ml     Physical Exam  Constitutional:       General: He is not in acute distress.     Appearance: He is ill-appearing. He is not diaphoretic.      Interventions: He is sedated.   HENT:      Head: Normocephalic.      Nose: Nose normal.      Mouth/Throat:      Mouth: Mucous membranes are moist.   Eyes:      General: No scleral icterus.  Neck:      Comments: Vent to trach  Cardiovascular:      Rate and Rhythm: Normal rate and regular rhythm.      Comments: Rate 71  Pulmonary:      Effort: Pulmonary effort is normal. No respiratory distress.      Breath sounds: Rhonchi present. No wheezing.   Abdominal:      General: There is no distension.      Comments: PEG   Musculoskeletal:      Right lower leg: No edema.      Left lower leg: No edema.   Skin:     Coloration: Skin is not pale.   Neurological:      Comments: Sedated        Electronically signed by SHAHNAZ Cabrera, 2/2/2022, 07:45 CST      Physician Substantive Portion: Medical Decision Making:    Laboratory Data:  Results from last 7 days   Lab Units 02/02/22  0311 01/31/22  0140 01/30/22  0841   WBC 10*3/mm3 8.28 7.54 8.83   HEMOGLOBIN g/dL 10.0* 10.1* 10.0*   PLATELETS 10*3/mm3 196 223 237     Results from last 7 days   Lab Units 02/02/22  0311 01/31/22  0140 01/29/22  0315 01/28/22  0242 01/27/22  1140   SODIUM mmol/L 136 138 137   < >  --    POTASSIUM mmol/L 3.9 4.0 3.8   < >  --    BUN mg/dL 23* 17 17   < >  --    CREATININE mg/dL 0.42* 0.31* 0.34*   < >  --    INR   --   --   --   --  1.16*    < > = values in this interval not displayed.     Results from last 7 days   Lab Units 02/02/22  0410 02/01/22  1046 02/01/22  0256   PH, ARTERIAL pH units 7.488* 7.517*  7.419   PCO2, ARTERIAL mm Hg 39.7 38.2 49.1*   PO2 ART mm Hg 78.8* 99.9 86.4   FIO2 % 30 30 30     No results found for: BLOODCX, URINECX, WOUNDCX, MRSACX, RESPCX, STOOLCX  Recent films:  No radiology results for the last day  Films reviewed personally by me.       My radiograph interpretation/independent review of imaging:    My radiograph interpretation/independent review of imaging: No films since January 29.    Pulmonary Assessment:    1. Acute respiratory failure with hypoxia due to Covid  2. Metabolic alkalosis stable  3. Tracheostomy status stable  4. Status post pulmonary embolism, stable, on treatment  5. Ventilator dyssynchrony improved on pressure control ventilation  6. Unstable  7. Metabolic process, sedated    Recommend/plan:   · Inspiratory pressure decreased to 16.  · Discontinue Precedex.  · Not ready for spontaneous breathing trials yet.  · Continue DVT and stress ulcer prophylaxis.  · As needed morphine and Ativan as needed for sedation and try to work toward awakening.    This visit was performed by both a physician and an Advanced Practice RN.  I personally evaluated and examined the patient.  I performed all aspects of the medical decision making as documented.  Electronically signed by Mario Hong MD, 2/2/2022, 17:49 CST

## 2022-02-02 NOTE — CASE MANAGEMENT/SOCIAL WORK
Continued Stay Note  [facility]     Patient Name: Eddy Ferro  MRN: [id number]  Today's Date: 2/2/2022    Admit Date: 1/11/2022     Discharge Plan     Row Name 02/02/22 0919       Plan    Plan Spoke with Marquita with [facility] [phone].  [facility] does not have an ICU bed at this time that can accomodate propofol and precedex drips.  Plan to update with [facility] on Friday, February 4th.  If bed available could potentially arranged for weekend admit, transport/weather dependent.               Discharge Codes    No documentation.                     MAURY aDs

## 2022-02-02 NOTE — PROGRESS NOTES
[facility] Medicine Services  INPATIENT PROGRESS NOTE    Patient Name: Eddy Ferro  Date of Admission: 1/11/2022  Today's Date: 02/02/22  Length of Stay: 22  Primary Care Physician: Provider, No Known    Subjective   Chief Complaint: f/u   HPI       Received morphine and ativan  We increased Lopressor IV to 5 mg  Because since  D/c of Precedex he has more episode of svt. Potasium is ok    Despite starting on erythromycin (pink in color - other meds such as Robitussin), patient still has lots of gastric drainage. He has been on TPN for nutrition for over a week now.  Last KUB dates back on Jan 29. Non obstructive bowel gas pattern. Unclear as to cause of this.     When off sedation, he is able to follow commands and interactive as per discussion with nurse Goodman.    + good BM today per nurse  Review of Systems   Unable to participate in his care      Objective    Temp:  [97 °F (36.1 °C)-98.7 °F (37.1 °C)] 98.1 °F (36.7 °C)  Heart Rate:  [] 120  Resp:  [16-29] 29  BP: ()/(58-93) 133/93  FiO2 (%):  [30 %] 30 %  Physical Exam  On TPN  Propofol running at 70mcg  HEENT:  Atraumatic, Anicteric, Trachea midline, + trach,no gross secretions from tracheostomy noted; thick secretion out of the mouth   He is on minimal vent setting   Lungs: equal chest rise bilaterally, no vent desynchrony   Heart: Regular rate and rhythm, no gross murmur.  Maintaining sinus rhythm on telemetry  ABD: Soft, + PEG, no drainage  Moss catheter in place  PICC line on the left arm  Extremities: no cyanosis, no edema  Skin: no obvious rashes or petechiae  Neuro: Unable to assess at this time due to sedation  Psych: Unable to assess at this time due to sedation       Results Review:  I have reviewed the labs, radiology results, and diagnostic studies.    Laboratory Data:   Results from last 7 days   Lab Units 02/02/22  0311 01/31/22  0140 01/30/22  0841   WBC 10*3/mm3 8.28 7.54 8.83   HEMOGLOBIN g/dL  10.0* 10.1* 10.0*   HEMATOCRIT % 33.0* 34.4* 32.5*   PLATELETS 10*3/mm3 196 223 237        Results from last 7 days   Lab Units 02/02/22  0311 01/31/22  0140 01/29/22  0315   SODIUM mmol/L 136 138 137   POTASSIUM mmol/L 3.9 4.0 3.8   CHLORIDE mmol/L 99 101 101   CO2 mmol/L 29.0 30.0* 31.0*   BUN mg/dL 23* 17 17   CREATININE mg/dL 0.42* 0.31* 0.34*   CALCIUM mg/dL 8.1* 8.3* 8.2*   BILIRUBIN mg/dL 1.2  --   --    ALK PHOS U/L 148*  --   --    ALT (SGPT) U/L 39  --   --    AST (SGOT) U/L 45*  --   --    GLUCOSE mg/dL 114* 108* 123*       Culture Data:   No results found for: BLOODCX, URINECX, WOUNDCX, MRSACX, RESPCX, STOOLCX    Radiology Data:   Imaging Results (Last 24 Hours)     ** No results found for the last 24 hours. **          I have reviewed the patient's current medications.     Assessment/Plan     Active Hospital Problems    Diagnosis    • **Pneumonia due to COVID-19 virus    • Severe malnutrition (CMS/HCC)    • Hx of tracheostomy    • Ventilator dependent (HCC)    • Acute hypoxemic respiratory failure (HCC)    • Acute respiratory distress syndrome (ARDS) due to 2019 novel coronavirus (HCC)    • Obesity (BMI 30-39.9)    • Acute subsegmental pulmonary embolism without acute cor pulmonale (HCC)      Problem list  Acute hypoxic respiratory failure in the setting of Covid, PE, Klebsiella pneumonia  PE -on Lovenox  COVID-19  Nutrition on TPN due to intolerance with gastric retention  Urinary retention  SVT     Plan:  kub  Resume precedex due to HR not tolerating (SVT and isolated pvc.)  maintain potassium > 4 and and mag > 2  Stable hgb. - despite pink output from his ngt aspirate - likely med color   Added diphenhydramine based on algorithm reviewed from [facility] re: gastroparesis  Increase lopressor IV re: arrhythmia  Status post trach and PEG January 28  Had completed Decadron  Had received baricitinib, Remdesivir in outpatient setting  Had completed 7 days of ceftriaxone prior to all other antibiotics received  from transferring facility  check thyroid profile  Cont TPN  Now off isolation  TPN until able to tolerate tube feeding  Moss catheter for urinary retention, Hytrin  Awaiting LTAC   Discussed with nurse Rex    albuterol, 2.5 mg, Nebulization, Q6H - RT  ascorbic acid, 500 mg, Per PEG Tube, Daily  atorvastatin, 20 mg, Per PEG Tube, Nightly  budesonide-formoterol, 2 puff, Inhalation, BID - RT  cholecalciferol, 1,000 Units, Per PEG Tube, Daily  diphenhydrAMINE, 12.5 mg, Oral, Q6H  enoxaparin, 1 mg/kg, Subcutaneous, Q12H  erythromycin ethylsuccinate, 100 mg, Per G Tube, BID With Meals  guaiFENesin, 400 mg, Per PEG Tube, Q8H  metoclopramide, 5 mg, Intravenous, TID  metoprolol tartrate, 5 mg, Intravenous, Q6H  oxyCODONE, 5 mg, Per PEG Tube, Q6H  pantoprazole, 40 mg, Intravenous, Q12H  polyethylene glycol, 17 g, Per PEG Tube, Daily  potassium chloride, 10 mEq, Intravenous, Once  senna-docusate sodium, 1 tablet, Per PEG Tube, BID  terazosin, 1 mg, Per PEG Tube, Nightly  thiamine, 100 mg, Per PEG Tube, Daily  zinc sulfate, 220 mg, Per PEG Tube, Daily        Discharge Planning: tbd  Electronically signed by Bhupinder Moss MD, 02/02/22, 16:43 CST.

## 2022-02-02 NOTE — SIGNIFICANT NOTE
02/02/22 0852   Readings   Plateau Pressure (cm H2O) 23 cm H2O   Static Compliance (L/cm H2O) 38   Dynamic Compliance (L/cm H2O) 34 L/cm H2O

## 2022-02-02 NOTE — PROGRESS NOTES
Adult Nutrition  Assessment/PES    Patient Name:  Eddy Ferro  YOB: 1970  MRN: [id number]  Admit Date:  1/11/2022    Assessment Date:  2/2/2022    Comments:  Nutrition follow up. TF continues to be on hold d/t elevated residuals. Pt on custom TPN, increase goal rate from 80 to 90 ml/hr today to optimize kcal and protein. Propofol providing ~789 kcal/d as well.     MD may consider alternate prokinetic agent and/or PEG-J tube.  RD will continue to follow.     Reason for Assessment              Reason for Assessment    Reason For Assessment follow-up protocol; TF/PN                Nutrition/Diet History              Nutrition/Diet History    Typical Food/Fluid Intake TF remains on hold. increasing residuals over the last 24 hrs from 225-500 ml     Factors Affecting Nutritional Intake compromised airway                Anthropometrics              Body Mass Index (BMI)    BMI Assessment BMI 30-34.9: obesity grade I                Labs/Tests/Procedures/Meds              Labs/Procedures/Meds    Lab Results Reviewed reviewed, pertinent     Lab Results Comments Creat, BUN, BUN:Cr ratio, AlkPhos, AST, Albumin, H/H            Diagnostic Tests/Procedures    Diagnostic Test/Procedure Reviewed reviewed            Medications    Pertinent Medications Reviewed reviewed, pertinent     Pertinent Medications Comments propofol, reglan, miralax, pericolace                Physical Findings              Physical Findings    Overall Physical Appearance on ventilator support     Gastrointestinal feeding tube; other (see comments)  last BM 1/29     Tubes PEG     Skin poor skin integrity/turgor; pressure injury; other (see comments)                Estimated/Assessed Needs              Calculation Measurements    Weight Used For Calculations 95.3 kg (210 lb 1.6 oz)            Estimated/Assessed Needs    Additional Documentation [place] State Equation (Group)            [place] State Equation    Ve (L/Min) for [place] Equation  Calculation 19.9     RMR ([place] State Equation) 2259.03            Fluid Requirements    Fluid Requirements (mL/day) 2259     Estimated Fluid Requirement Method RDA Method     RDA Method (mL) 2259                    Nutrition Prescription              Nutrition Prescription EN    Enteral Prescription Continue same protocol  TF currently remains on hold d/t high residuals            Nutrition Prescription PN    Parenteral Prescription PN begin/change; Parenteral to supply     PN Route PICC     Dextrose Concentration (%) 10 %     Dextrose (Kcal) 734.4     Amino Acid Concentration (%) 7.0%     Amino Acid (gm) 151.2     PN Goal Rate (mL/hr) 90 mL/hr     PN Starting Rate (mL/hr) 80 mL/hr     PN Goal Volume (mL) 2160 mL     PN Starting Volume (mL) 1920 mL     Lipid Concentration (%) Other (comment)  no lipids, Pt on propofol     New PN Prescription Ordered? Yes            PN to Supply    Kcal/Day 2128.6 Kcal/day     Kcal/Kg 22.34 Kcal/kg     Kcal/Kg Weight Method Actual weight     Protein (gm/day) 151.2 gm/day     Meet Estimated Kcal Need (%) 94 %     Meet Estimated Protein Need (%) 100 %                Education/Evaluation              Education    Education No discharge needs identified at this time            Monitor/Evaluation    Monitor Per protocol; I&O; PO intake; Pertinent labs; PN delivery/tolerance; TF delivery/tolerance; Weight; Symptoms                 Electronically signed by:  Mariah Kelly RDN, LD  02/02/22 14:57 CST

## 2022-02-03 LAB
ALBUMIN SERPL-MCNC: 2.5 G/DL (ref 3.5–5.2)
ALBUMIN/GLOB SERPL: 0.5 G/DL
ALP SERPL-CCNC: 172 U/L (ref 39–117)
ALT SERPL W P-5'-P-CCNC: 53 U/L (ref 1–41)
ANION GAP SERPL CALCULATED.3IONS-SCNC: 6 MMOL/L (ref 5–15)
ARTERIAL PATENCY WRIST A: POSITIVE
AST SERPL-CCNC: 69 U/L (ref 1–40)
ATMOSPHERIC PRESS: 752 MMHG
BASE EXCESS BLDA CALC-SCNC: 2.9 MMOL/L (ref 0–2)
BASOPHILS # BLD AUTO: 0.1 10*3/MM3 (ref 0–0.2)
BASOPHILS NFR BLD AUTO: 1.1 % (ref 0–1.5)
BDY SITE: ABNORMAL
BILIRUB SERPL-MCNC: 1.3 MG/DL (ref 0–1.2)
BODY TEMPERATURE: 37 C
BUN SERPL-MCNC: 21 MG/DL (ref 6–20)
BUN/CREAT SERPL: 50 (ref 7–25)
CALCIUM SPEC-SCNC: 8.1 MG/DL (ref 8.6–10.5)
CHLORIDE SERPL-SCNC: 104 MMOL/L (ref 98–107)
CO2 SERPL-SCNC: 27 MMOL/L (ref 22–29)
CREAT SERPL-MCNC: 0.42 MG/DL (ref 0.76–1.27)
DEPRECATED RDW RBC AUTO: 55.8 FL (ref 37–54)
EOSINOPHIL # BLD AUTO: 1.74 10*3/MM3 (ref 0–0.4)
EOSINOPHIL NFR BLD AUTO: 19.5 % (ref 0.3–6.2)
ERYTHROCYTE [DISTWIDTH] IN BLOOD BY AUTOMATED COUNT: 18.7 % (ref 12.3–15.4)
GFR SERPL CREATININE-BSD FRML MDRD: >150 ML/MIN/1.73
GLOBULIN UR ELPH-MCNC: 4.6 GM/DL
GLUCOSE BLDC GLUCOMTR-MCNC: 103 MG/DL (ref 70–130)
GLUCOSE BLDC GLUCOMTR-MCNC: 105 MG/DL (ref 70–130)
GLUCOSE BLDC GLUCOMTR-MCNC: 108 MG/DL (ref 70–130)
GLUCOSE SERPL-MCNC: 100 MG/DL (ref 65–99)
HCO3 BLDA-SCNC: 27.8 MMOL/L (ref 20–26)
HCT VFR BLD AUTO: 34 % (ref 37.5–51)
HGB BLD-MCNC: 10.3 G/DL (ref 13–17.7)
IMM GRANULOCYTES # BLD AUTO: 0.18 10*3/MM3 (ref 0–0.05)
IMM GRANULOCYTES NFR BLD AUTO: 2 % (ref 0–0.5)
INHALED O2 CONCENTRATION: 30 %
LYMPHOCYTES # BLD AUTO: 2.51 10*3/MM3 (ref 0.7–3.1)
LYMPHOCYTES NFR BLD AUTO: 28.1 % (ref 19.6–45.3)
Lab: ABNORMAL
MAGNESIUM SERPL-MCNC: 2.1 MG/DL (ref 1.6–2.6)
MCH RBC QN AUTO: 25.3 PG (ref 26.6–33)
MCHC RBC AUTO-ENTMCNC: 30.3 G/DL (ref 31.5–35.7)
MCV RBC AUTO: 83.5 FL (ref 79–97)
MODALITY: ABNORMAL
MONOCYTES # BLD AUTO: 0.8 10*3/MM3 (ref 0.1–0.9)
MONOCYTES NFR BLD AUTO: 9 % (ref 5–12)
NEUTROPHILS NFR BLD AUTO: 3.59 10*3/MM3 (ref 1.7–7)
NEUTROPHILS NFR BLD AUTO: 40.3 % (ref 42.7–76)
NRBC BLD AUTO-RTO: 0 /100 WBC (ref 0–0.2)
PAW @ PEAK INSP FLOW SETTING VENT: 16 CMH2O
PCO2 BLDA: 43.2 MM HG (ref 35–45)
PCO2 TEMP ADJ BLD: 43.2 MM HG (ref 35–45)
PEEP RESPIRATORY: 5 CM[H2O]
PH BLDA: 7.42 PH UNITS (ref 7.35–7.45)
PH, TEMP CORRECTED: 7.42 PH UNITS (ref 7.35–7.45)
PLATELET # BLD AUTO: 217 10*3/MM3 (ref 140–450)
PMV BLD AUTO: 11.8 FL (ref 6–12)
PO2 BLDA: 97.4 MM HG (ref 83–108)
PO2 TEMP ADJ BLD: 97.4 MM HG (ref 83–108)
POTASSIUM SERPL-SCNC: 4.5 MMOL/L (ref 3.5–5.2)
PROT SERPL-MCNC: 7.1 G/DL (ref 6–8.5)
RBC # BLD AUTO: 4.07 10*6/MM3 (ref 4.14–5.8)
SAO2 % BLDCOA: 97.6 % (ref 94–99)
SET MECH RESP RATE: 14
SODIUM SERPL-SCNC: 137 MMOL/L (ref 136–145)
T4 FREE SERPL-MCNC: 1.07 NG/DL (ref 0.93–1.7)
TSH SERPL DL<=0.05 MIU/L-ACNC: 2.86 UIU/ML (ref 0.27–4.2)
VENTILATOR MODE: ABNORMAL
WBC NRBC COR # BLD: 8.92 10*3/MM3 (ref 3.4–10.8)

## 2022-02-03 PROCEDURE — 25010000002 MORPHINE SULFATE (PF) 2 MG/ML SOLUTION: Performed by: INTERNAL MEDICINE

## 2022-02-03 PROCEDURE — 84481 FREE ASSAY (FT-3): CPT | Performed by: INTERNAL MEDICINE

## 2022-02-03 PROCEDURE — 99233 SBSQ HOSP IP/OBS HIGH 50: CPT | Performed by: INTERNAL MEDICINE

## 2022-02-03 PROCEDURE — 25010000002 CALCIUM GLUCONATE PER 10 ML: Performed by: INTERNAL MEDICINE

## 2022-02-03 PROCEDURE — 25010000002 AMIODARONE IN DEXTROSE 5% 150-4.21 MG/100ML-% SOLUTION

## 2022-02-03 PROCEDURE — 94799 UNLISTED PULMONARY SVC/PX: CPT

## 2022-02-03 PROCEDURE — 94003 VENT MGMT INPAT SUBQ DAY: CPT

## 2022-02-03 PROCEDURE — 93005 ELECTROCARDIOGRAM TRACING: CPT | Performed by: INTERNAL MEDICINE

## 2022-02-03 PROCEDURE — 83735 ASSAY OF MAGNESIUM: CPT | Performed by: INTERNAL MEDICINE

## 2022-02-03 PROCEDURE — 25010000002 METOCLOPRAMIDE PER 10 MG: Performed by: INTERNAL MEDICINE

## 2022-02-03 PROCEDURE — 36600 WITHDRAWAL OF ARTERIAL BLOOD: CPT

## 2022-02-03 PROCEDURE — 84439 ASSAY OF FREE THYROXINE: CPT | Performed by: INTERNAL MEDICINE

## 2022-02-03 PROCEDURE — 25010000002 ENOXAPARIN PER 10 MG: Performed by: INTERNAL MEDICINE

## 2022-02-03 PROCEDURE — 99254 IP/OBS CNSLTJ NEW/EST MOD 60: CPT | Performed by: INTERNAL MEDICINE

## 2022-02-03 PROCEDURE — 25010000002 MAGNESIUM SULFATE PER 500 MG OF MAGNESIUM: Performed by: INTERNAL MEDICINE

## 2022-02-03 PROCEDURE — 99232 SBSQ HOSP IP/OBS MODERATE 35: CPT | Performed by: OTOLARYNGOLOGY

## 2022-02-03 PROCEDURE — 85025 COMPLETE CBC W/AUTO DIFF WBC: CPT | Performed by: INTERNAL MEDICINE

## 2022-02-03 PROCEDURE — 25010000002 LORAZEPAM PER 2 MG: Performed by: NURSE PRACTITIONER

## 2022-02-03 PROCEDURE — 25010000002 POTASSIUM CHLORIDE PER 2 MEQ OF POTASSIUM: Performed by: INTERNAL MEDICINE

## 2022-02-03 PROCEDURE — 82962 GLUCOSE BLOOD TEST: CPT

## 2022-02-03 PROCEDURE — 82803 BLOOD GASES ANY COMBINATION: CPT

## 2022-02-03 PROCEDURE — 25010000002 AMIODARONE IN DEXTROSE 5% 360-4.14 MG/200ML-% SOLUTION: Performed by: INTERNAL MEDICINE

## 2022-02-03 PROCEDURE — 80053 COMPREHEN METABOLIC PANEL: CPT | Performed by: INTERNAL MEDICINE

## 2022-02-03 PROCEDURE — 84443 ASSAY THYROID STIM HORMONE: CPT | Performed by: INTERNAL MEDICINE

## 2022-02-03 PROCEDURE — 25010000002 PROPOFOL 10 MG/ML EMULSION: Performed by: INTERNAL MEDICINE

## 2022-02-03 PROCEDURE — 93010 ELECTROCARDIOGRAM REPORT: CPT | Performed by: INTERNAL MEDICINE

## 2022-02-03 RX ORDER — METOPROLOL TARTRATE 5 MG/5ML
5 INJECTION INTRAVENOUS EVERY 4 HOURS
Status: DISCONTINUED | OUTPATIENT
Start: 2022-02-03 | End: 2022-02-04

## 2022-02-03 RX ORDER — AMIODARONE HYDROCHLORIDE 200 MG/1
200 TABLET ORAL EVERY 12 HOURS
Status: DISCONTINUED | OUTPATIENT
Start: 2022-02-11 | End: 2022-02-09 | Stop reason: HOSPADM

## 2022-02-03 RX ORDER — AMIODARONE HYDROCHLORIDE 200 MG/1
200 TABLET ORAL ONCE
Status: COMPLETED | OUTPATIENT
Start: 2022-02-04 | End: 2022-02-04

## 2022-02-03 RX ORDER — AMIODARONE HYDROCHLORIDE 200 MG/1
200 TABLET ORAL DAILY
Status: DISCONTINUED | OUTPATIENT
Start: 2022-02-25 | End: 2022-02-09 | Stop reason: HOSPADM

## 2022-02-03 RX ORDER — AMIODARONE HYDROCHLORIDE 200 MG/1
200 TABLET ORAL EVERY 8 HOURS
Status: DISCONTINUED | OUTPATIENT
Start: 2022-02-04 | End: 2022-02-09 | Stop reason: HOSPADM

## 2022-02-03 RX ADMIN — PROPOFOL 60 MCG/KG/MIN: 10 INJECTION, EMULSION INTRAVENOUS at 17:55

## 2022-02-03 RX ADMIN — ATORVASTATIN CALCIUM 20 MG: 10 TABLET, FILM COATED ORAL at 21:31

## 2022-02-03 RX ADMIN — ENOXAPARIN SODIUM 100 MG: 100 INJECTION SUBCUTANEOUS at 23:42

## 2022-02-03 RX ADMIN — ERYTHROMYCIN ETHYLSUCCINATE 100 MG: 200 GRANULE, FOR SUSPENSION ORAL at 09:52

## 2022-02-03 RX ADMIN — ALBUTEROL SULFATE 2.5 MG: 2.5 SOLUTION RESPIRATORY (INHALATION) at 12:45

## 2022-02-03 RX ADMIN — PANTOPRAZOLE SODIUM 40 MG: 40 INJECTION, POWDER, FOR SOLUTION INTRAVENOUS at 08:54

## 2022-02-03 RX ADMIN — OXYCODONE HYDROCHLORIDE 5 MG: 5 SOLUTION ORAL at 11:39

## 2022-02-03 RX ADMIN — DIPHENHYDRAMINE HYDROCHLORIDE 12.5 MG: 12.5 SOLUTION ORAL at 00:27

## 2022-02-03 RX ADMIN — POTASSIUM PHOSPHATE, MONOBASIC POTASSIUM PHOSPHATE, DIBASIC: 224; 236 INJECTION, SOLUTION, CONCENTRATE INTRAVENOUS at 17:14

## 2022-02-03 RX ADMIN — Medication 1000 UNITS: at 08:55

## 2022-02-03 RX ADMIN — OXYCODONE HYDROCHLORIDE 5 MG: 5 SOLUTION ORAL at 00:25

## 2022-02-03 RX ADMIN — BUDESONIDE AND FORMOTEROL FUMARATE DIHYDRATE 2 PUFF: 160; 4.5 AEROSOL RESPIRATORY (INHALATION) at 06:45

## 2022-02-03 RX ADMIN — PANTOPRAZOLE SODIUM 40 MG: 40 INJECTION, POWDER, FOR SOLUTION INTRAVENOUS at 21:30

## 2022-02-03 RX ADMIN — METOPROLOL TARTRATE 5 MG: 1 INJECTION, SOLUTION INTRAVENOUS at 00:18

## 2022-02-03 RX ADMIN — ENOXAPARIN SODIUM 100 MG: 100 INJECTION SUBCUTANEOUS at 00:25

## 2022-02-03 RX ADMIN — ENOXAPARIN SODIUM 100 MG: 100 INJECTION SUBCUTANEOUS at 11:39

## 2022-02-03 RX ADMIN — GUAIFENESIN 400 MG: 100 SOLUTION ORAL at 21:30

## 2022-02-03 RX ADMIN — PROPOFOL 70 MCG/KG/MIN: 10 INJECTION, EMULSION INTRAVENOUS at 23:27

## 2022-02-03 RX ADMIN — OXYCODONE HYDROCHLORIDE 5 MG: 5 SOLUTION ORAL at 23:42

## 2022-02-03 RX ADMIN — ALBUTEROL SULFATE 2.5 MG: 2.5 SOLUTION RESPIRATORY (INHALATION) at 06:45

## 2022-02-03 RX ADMIN — PROPOFOL 60 MCG/KG/MIN: 10 INJECTION, EMULSION INTRAVENOUS at 11:18

## 2022-02-03 RX ADMIN — AMIODARONE HYDROCHLORIDE 1 MG/MIN: 1.8 INJECTION, SOLUTION INTRAVENOUS at 16:37

## 2022-02-03 RX ADMIN — PROPOFOL 60 MCG/KG/MIN: 10 INJECTION, EMULSION INTRAVENOUS at 20:54

## 2022-02-03 RX ADMIN — METOPROLOL TARTRATE 5 MG: 5 INJECTION INTRAVENOUS at 22:43

## 2022-02-03 RX ADMIN — METOCLOPRAMIDE HYDROCHLORIDE 5 MG: 5 INJECTION INTRAMUSCULAR; INTRAVENOUS at 08:54

## 2022-02-03 RX ADMIN — POLYETHYLENE GLYCOL 3350 17 G: 17 POWDER, FOR SOLUTION ORAL at 08:55

## 2022-02-03 RX ADMIN — LORAZEPAM 2 MG: 2 INJECTION INTRAMUSCULAR; INTRAVENOUS at 09:52

## 2022-02-03 RX ADMIN — AMIODARONE HYDROCHLORIDE 1 MG/MIN: 1.8 INJECTION, SOLUTION INTRAVENOUS at 11:12

## 2022-02-03 RX ADMIN — METOCLOPRAMIDE HYDROCHLORIDE 5 MG: 5 INJECTION INTRAMUSCULAR; INTRAVENOUS at 15:25

## 2022-02-03 RX ADMIN — DIPHENHYDRAMINE HYDROCHLORIDE 12.5 MG: 12.5 SOLUTION ORAL at 05:22

## 2022-02-03 RX ADMIN — BUDESONIDE AND FORMOTEROL FUMARATE DIHYDRATE 2 PUFF: 160; 4.5 AEROSOL RESPIRATORY (INHALATION) at 18:49

## 2022-02-03 RX ADMIN — METOPROLOL TARTRATE 5 MG: 5 INJECTION INTRAVENOUS at 10:46

## 2022-02-03 RX ADMIN — DIPHENHYDRAMINE HYDROCHLORIDE 12.5 MG: 12.5 SOLUTION ORAL at 11:39

## 2022-02-03 RX ADMIN — MORPHINE SULFATE 2 MG: 2 INJECTION, SOLUTION INTRAMUSCULAR; INTRAVENOUS at 21:50

## 2022-02-03 RX ADMIN — TERAZOSIN HYDROCHLORIDE 1 MG: 1 CAPSULE ORAL at 21:30

## 2022-02-03 RX ADMIN — PROPOFOL 65 MCG/KG/MIN: 10 INJECTION, EMULSION INTRAVENOUS at 02:00

## 2022-02-03 RX ADMIN — METOPROLOL TARTRATE 5 MG: 5 INJECTION INTRAVENOUS at 18:34

## 2022-02-03 RX ADMIN — DIPHENHYDRAMINE HYDROCHLORIDE 12.5 MG: 12.5 SOLUTION ORAL at 17:14

## 2022-02-03 RX ADMIN — ERYTHROMYCIN ETHYLSUCCINATE 100 MG: 200 GRANULE, FOR SUSPENSION ORAL at 17:14

## 2022-02-03 RX ADMIN — ALBUTEROL SULFATE 2.5 MG: 2.5 SOLUTION RESPIRATORY (INHALATION) at 18:49

## 2022-02-03 RX ADMIN — OXYCODONE HYDROCHLORIDE AND ACETAMINOPHEN 500 MG: 500 TABLET ORAL at 08:55

## 2022-02-03 RX ADMIN — ZINC SULFATE 220 MG (50 MG) CAPSULE 220 MG: CAPSULE at 08:54

## 2022-02-03 RX ADMIN — OXYCODONE HYDROCHLORIDE 5 MG: 5 SOLUTION ORAL at 05:22

## 2022-02-03 RX ADMIN — GUAIFENESIN 400 MG: 100 SOLUTION ORAL at 14:06

## 2022-02-03 RX ADMIN — DIPHENHYDRAMINE HYDROCHLORIDE 12.5 MG: 12.5 SOLUTION ORAL at 23:42

## 2022-02-03 RX ADMIN — METOPROLOL TARTRATE 5 MG: 1 INJECTION, SOLUTION INTRAVENOUS at 05:23

## 2022-02-03 RX ADMIN — THIAMINE HCL TAB 100 MG 100 MG: 100 TAB at 08:54

## 2022-02-03 RX ADMIN — OXYCODONE HYDROCHLORIDE 5 MG: 5 SOLUTION ORAL at 17:14

## 2022-02-03 RX ADMIN — METOCLOPRAMIDE HYDROCHLORIDE 5 MG: 5 INJECTION INTRAMUSCULAR; INTRAVENOUS at 21:31

## 2022-02-03 RX ADMIN — GUAIFENESIN 400 MG: 100 SOLUTION ORAL at 06:38

## 2022-02-03 RX ADMIN — AMIODARONE HYDROCHLORIDE 150 MG: 1.5 INJECTION, SOLUTION INTRAVENOUS at 10:52

## 2022-02-03 RX ADMIN — DOCUSATE SODIUM 50 MG AND SENNOSIDES 8.6 MG 1 TABLET: 8.6; 5 TABLET, FILM COATED ORAL at 08:55

## 2022-02-03 RX ADMIN — ALBUTEROL SULFATE 2.5 MG: 2.5 SOLUTION RESPIRATORY (INHALATION) at 23:56

## 2022-02-03 RX ADMIN — DEXMEDETOMIDINE HYDROCHLORIDE 0.4 MCG/KG/HR: 4 INJECTION, SOLUTION INTRAVENOUS at 07:57

## 2022-02-03 RX ADMIN — METOPROLOL TARTRATE 5 MG: 5 INJECTION INTRAVENOUS at 15:25

## 2022-02-03 RX ADMIN — DOCUSATE SODIUM 50 MG AND SENNOSIDES 8.6 MG 1 TABLET: 8.6; 5 TABLET, FILM COATED ORAL at 21:30

## 2022-02-03 RX ADMIN — PROPOFOL 65 MCG/KG/MIN: 10 INJECTION, EMULSION INTRAVENOUS at 04:51

## 2022-02-03 RX ADMIN — PROPOFOL 60 MCG/KG/MIN: 10 INJECTION, EMULSION INTRAVENOUS at 14:06

## 2022-02-03 RX ADMIN — DEXMEDETOMIDINE HYDROCHLORIDE 0.3 MCG/KG/HR: 4 INJECTION, SOLUTION INTRAVENOUS at 10:50

## 2022-02-03 RX ADMIN — PROPOFOL 65 MCG/KG/MIN: 10 INJECTION, EMULSION INTRAVENOUS at 07:57

## 2022-02-03 NOTE — CONSULTS
LOS: 23 days   Patient Care Team:  Provider, No Known as PCP - General    Chief Complaint: Intubated mechanically ventilated with paroxysmal atrial fibrillation     Moshe Ferro is a 51 y.o. male who is being seen in consultation for paroxysmal rapid atrial fibrillation  Currently in atrial fibrillation with rates between 120-140 bpm  Patient is on mechanical ventilation  Is recovering from Covid pneumonia  No family member by bedside  Nursing by bedside  Hemodynamically stable  Latest test results reviewed  Prior echo shows preserved left ventricle ejection fraction  Right ventricle was mildly enlarged    Review of Systems       Unable to perform as intubated  Cannot obtain due to mechanical ventilation.  The patient notably is critically ill and connected to a ventilator.  As such patient cannot communicate and provide any history whatsoever, including any history of present illness or interval history since arrival or review of systems. The interested reviewer may note this fact, as an attempt has been made at collecting and documenting these portions of the patient history, but this information is unobtainable despite attempted review and therefore cannot be documented at this time.      History:   Past Medical History:   Diagnosis Date   • Murray's esophagus    • GERD (gastroesophageal reflux disease)    • Hypertension      Past Surgical History:   Procedure Laterality Date   • KNEE SURGERY     • LARYNGOSCOPY N/A 1/25/2022    Procedure: laryngoscopy;  Surgeon: Liam Bergman Jr., MD;  Location: [facility] OR;  Service: ENT;  Laterality: N/A;   • PEG TUBE INSERTION N/A 1/27/2022    Procedure: PERCUTANEOUS ENDOSCOPIC GASTROSTOMY TUBE INSERTION at bedside;  Surgeon: Ino Wilkinson MD;  Location: [facility] ENDOSCOPY;  Service: Gastroenterology;  Laterality: N/A;  preop; peg placement   postop; peg placement   PCP none   • SPINE SURGERY     • TRACHEOSTOMY N/A 1/25/2022    Procedure:  tracheostomy;  Surgeon: Liam Bergman Jr., MD;  Location: [facility];  Service: ENT;  Laterality: N/A;     Social History     Socioeconomic History   • Marital status: Single   Tobacco Use   • Smoking status: Former Smoker   Substance and Sexual Activity   • Alcohol use: Yes     Comment: rare     Family History   Problem Relation Age of Onset   • Stroke Mother    • Cancer Father        Labs:  WBC WBC   Date Value Ref Range Status   02/03/2022 8.92 3.40 - 10.80 10*3/mm3 Final   02/02/2022 8.28 3.40 - 10.80 10*3/mm3 Final      HGB Hemoglobin   Date Value Ref Range Status   02/03/2022 10.3 (L) 13.0 - 17.7 g/dL Final   02/02/2022 10.0 (L) 13.0 - 17.7 g/dL Final      HCT Hematocrit   Date Value Ref Range Status   02/03/2022 34.0 (L) 37.5 - 51.0 % Final   02/02/2022 33.0 (L) 37.5 - 51.0 % Final      Platelets Platelets   Date Value Ref Range Status   02/03/2022 217 140 - 450 10*3/mm3 Final   02/02/2022 196 140 - 450 10*3/mm3 Final      MCV MCV   Date Value Ref Range Status   02/03/2022 83.5 79.0 - 97.0 fL Final   02/02/2022 83.1 79.0 - 97.0 fL Final        Results from last 7 days   Lab Units 02/03/22  0239 02/02/22  0311 01/31/22  0140   SODIUM mmol/L 137 136 138   POTASSIUM mmol/L 4.5 3.9 4.0   CHLORIDE mmol/L 104 99 101   CO2 mmol/L 27.0 29.0 30.0*   BUN mg/dL 21* 23* 17   CREATININE mg/dL 0.42* 0.42* 0.31*   CALCIUM mg/dL 8.1* 8.1* 8.3*   BILIRUBIN mg/dL 1.3* 1.2  --    ALK PHOS U/L 172* 148*  --    ALT (SGPT) U/L 53* 39  --    AST (SGOT) U/L 69* 45*  --    GLUCOSE mg/dL 100* 114* 108*     Lab Results   Component Value Date    CKTOTAL 74 01/23/2022    TROPONINT 0.018 01/11/2022     PT/INR:  No results found for: PROTIME/No results found for: INR    Imaging Results (Last 72 Hours)     Procedure Component Value Units Date/Time    XR Abdomen KUB [[id number]] Collected: 02/02/22 2102     Updated: 02/02/22 2107    Narrative:      EXAMINATION:  XR ABDOMEN KUB-  2/2/2022 9:01 PM CST     HISTORY: Gastric retention.  E43-Unspecified severe protein-calorie  malnutrition.     COMPARISON: 1/29/2022.     TECHNIQUE: Supine abdomen      FINDINGS:   The stomach is not distended. There is air predominantly in  the right colon. No small bowel distention is seen. There is a tube  overlying the stomach that may be a gastric tube. There is a Moss  catheter overlying the lower pelvis.       Impression:      No acute findings.        This report was finalized on 02/02/2022 21:04 by Dr. Andreas Dominguez MD.          Objective     No Known Allergies    Medication Review: Performed  Current Facility-Administered Medications   Medication Dose Route Frequency Provider Last Rate Last Admin   • acetaminophen (TYLENOL) 160 MG/5ML solution 650 mg  650 mg Per PEG Tube Q6H PRN Lane Cowan DO   650 mg at 01/31/22 2254   • acetaminophen (TYLENOL) suppository 650 mg  650 mg Rectal Q6H PRN Sumeet Laughlin DO   650 mg at 01/17/22 1434   • Adult Custom Central TPN   Intravenous Once per day on Sun Tue Thu Sat Bhupinder Moss MD       • Adult Custom Central TPN   Intravenous Once per day on Mon Wed Fri Bhupinder Moss MD 90 mL/hr at 02/02/22 1727 New Bag at 02/02/22 1727   • albuterol (PROVENTIL) nebulizer solution 0.083% 2.5 mg/3mL  2.5 mg Nebulization Q6H - RT Mario Hong MD   2.5 mg at 02/03/22 0645   • amiodarone 360 mg in 200 mL D5W infusion  1 mg/min Intravenous Continuous Sathya Barillas MD 33.3 mL/hr at 02/03/22 1112 1 mg/min at 02/03/22 1112    Followed by   • amiodarone 360 mg in 200 mL D5W infusion  0.5 mg/min Intravenous Continuous Sathya Barillas MD        Followed by   • [START ON 2/4/2022] amiodarone (PACERONE) tablet 200 mg  200 mg Oral Once Sathya Barillas MD        Followed by   • [START ON 2/4/2022] amiodarone (PACERONE) tablet 200 mg  200 mg Oral Q8H Sathya Barillas MD        Followed by   • [START ON 2/11/2022] amiodarone (PACERONE) tablet 200 mg  200 mg Oral Q12H Sathya Barillas MD        Followed by   • [START  ON 2/25/2022] amiodarone (PACERONE) tablet 200 mg  200 mg Oral Daily Sathya Barillas MD       • ascorbic acid (VITAMIN C) tablet 500 mg  500 mg Per PEG Tube Daily Lane Cowan DO   500 mg at 02/03/22 0855   • atorvastatin (LIPITOR) tablet 20 mg  20 mg Per PEG Tube Nightly Lane Cowan DO   20 mg at 02/02/22 2032   • bisacodyl (DULCOLAX) suppository 10 mg  10 mg Rectal Daily PRN Sumeet Laughlin DO   10 mg at 01/29/22 1149   • budesonide-formoterol (SYMBICORT) 160-4.5 MCG/ACT inhaler 2 puff  2 puff Inhalation BID - RT Sumeet Laughlin DO   2 puff at 02/03/22 0645   • cholecalciferol (VITAMIN D3) tablet 1,000 Units  1,000 Units Per PEG Tube Daily Lane Cowan DO   1,000 Units at 02/03/22 0855   • dexmedetomidine (PRECEDEX) 400 mcg in 100 mL NS infusion  0.2-1.5 mcg/kg/hr Intravenous Titrated Bhupinder Moss MD 7.1 mL/hr at 02/03/22 1050 0.3 mcg/kg/hr at 02/03/22 1050   • dextromethorphan polistirex ER (DELSYM) 30 MG/5ML oral suspension 60 mg  60 mg Per PEG Tube Q12H PRN Lane Cowan DO   60 mg at 01/31/22 2253   • diphenhydrAMINE (BENADRYL) 12.5 MG/5ML elixir 12.5 mg  12.5 mg Oral Q6H Bhupinder Moss MD   12.5 mg at 02/03/22 0522   • enoxaparin (LOVENOX) syringe 100 mg  1 mg/kg Subcutaneous Q12H Lane Cowan DO   100 mg at 02/03/22 0025   • erythromycin ethylsuccinate (EES) 200 MG/5ML suspension 100 mg  100 mg Per G Tube BID With Meals Bhupinder Moss MD   100 mg at 02/03/22 0952   • guaiFENesin (ROBITUSSIN) 100 MG/5ML oral solution 400 mg  400 mg Per PEG Tube Q8H Lane Cowan DO   400 mg at 02/03/22 0638   • hydrocortisone-bacitracin-zinc oxide-nystatin (MAGIC BARRIER) ointment 1 application  1 application Topical PRN Sumeet Laughlin DO       • LORazepam (ATIVAN) injection 2 mg  2 mg Intravenous Q4H PRN Kristin Barry APRN   2 mg at 02/03/22 0953   • metoclopramide (REGLAN) injection 5 mg  5 mg Intravenous TID Bhupinder Moss MD   5 mg  "at 02/03/22 0854   • metoprolol tartrate (LOPRESSOR) injection 5 mg  5 mg Intravenous Q4H Bhupinder Moss MD   5 mg at 02/03/22 1046   • Morphine sulfate (PF) injection 2 mg  2 mg Intravenous Q6H PRN Bhupinder Moss MD   2 mg at 02/02/22 1349   • ondansetron (ZOFRAN) tablet 4 mg  4 mg Oral Q6H PRN Sumeet Laughlin DO        Or   • ondansetron (ZOFRAN) injection 4 mg  4 mg Intravenous Q6H PRN Sumeet Laughlin DO       • oxyCODONE (ROXICODONE) 5 MG/5ML solution 5 mg  5 mg Per PEG Tube Q6H Lane Cowan DO   5 mg at 02/03/22 0522   • pantoprazole (PROTONIX) injection 40 mg  40 mg Intravenous Q12H Sumeet Laughlin DO   40 mg at 02/03/22 0854   • polyethylene glycol (MIRALAX) packet 17 g  17 g Per PEG Tube Daily Lane Cowan DO   17 g at 02/03/22 0855   • propofol (DIPRIVAN) infusion 10 mg/mL 100 mL  5-75 mcg/kg/min Intravenous Titrated Sumeet Laguhlin DO 32.6 mL/hr at 02/03/22 1000 60 mcg/kg/min at 02/03/22 1000   • sennosides-docusate (PERICOLACE) 8.6-50 MG per tablet 1 tablet  1 tablet Per PEG Tube BID Lane Cowan DO   1 tablet at 02/03/22 0855   • sodium chloride 0.9 % flush 10 mL  10 mL Intravenous PRN Sumeet Laughlin DO   10 mL at 02/01/22 2126   • terazosin (HYTRIN) capsule 1 mg  1 mg Per PEG Tube Nightly Lane Cowan DO   1 mg at 02/02/22 2033   • thiamine (VITAMIN B-1) tablet 100 mg  100 mg Per PEG Tube Daily Lane Cowan DO   100 mg at 02/03/22 0854   • zinc sulfate (ZINCATE) capsule 220 mg  220 mg Per PEG Tube Daily Lane Cowan DO   220 mg at 02/03/22 0854       Vital Sign Min/Max for last 24 hours  Temp  Min: 97.2 °F (36.2 °C)  Max: 98.9 °F (37.2 °C)   BP  Min: 103/73  Max: 146/91   Pulse  Min: 85  Max: 193   Resp  Min: 29  Max: 34   SpO2  Min: 96 %  Max: 100 %   No data recorded   Weight  Min: 92.8 kg (204 lb 9.4 oz)  Max: 92.8 kg (204 lb 9.4 oz)     Flowsheet Rows      First Filed Value   Admission Height 177.8 cm (70\") Documented at " 01/11/2022 1815   Admission Weight 108 kg (237 lb 14 oz) Documented at 01/11/2022 1815          Results for orders placed during the hospital encounter of 01/11/22    Adult Transthoracic Echo Complete W/ Cont if Necessary Per Protocol    Interpretation Summary  · Calculated left ventricular EF = 60% Estimated left ventricular EF was in agreement with the calculated left ventricular EF. Left ventricular systolic function is normal.  · Left ventricular diastolic function was normal.  · The right ventricular cavity is mildly dilated.  · The right atrial cavity is mildly dilated.      Physical Exam:    General Appearance: Mechanically ventilated and sedated  Eyes: Pupils equal and reactive    Ears: Appear intact with no abnormalities noted  Nose: Nares normal, no drainage  Neck: no carotid bruit and no JVD  Back: no kyphosis present,    Lungs: respirations regular, respirations even and respirations unlabored  Heart: normal S1, S2, no significant murmurs   No gallops or rubs  no rub and no click  Abdomen: Soft  Skin: no bleeding, bruising or rash  Extremities: no cyanosis  Psychiatric/Behavioral: Mechanically ventilated    Results Review:   I reviewed the patient's new clinical results.  I reviewed the patient's new imaging results and agree with the interpretation.  I reviewed the patient's other test results and agree with the interpretation  I personally viewed and interpreted the patient's EKG/Telemetry data    Discussed with patient's nurse verbal     Reviewed available prior notes, consults, prior visits, laboratory findings, radiology and cardiology relevant reports.   Updated chart as applicable.   I have reviewed the patient's medical history in detail and updated the computerized patient record as relevant.          Assessment/Plan       Pneumonia due to COVID-19 virus    Acute hypoxemic respiratory failure (HCC)    Acute respiratory distress syndrome (ARDS) due to 2019 novel coronavirus (HCC)    Obesity (BMI  30-39.9)    Acute subsegmental pulmonary embolism without acute cor pulmonale (HCC)    Ventilator dependent (HCC)    Hx of tracheostomy    Severe malnutrition (CMS/HCC)  Prolonged QTC on last EKG    Plan    Discussed with nursing  Start on amiodarone bolus and infusion  Continue on anticoagulation with Lovenox full dose  Continue on telemetry  Nutrition  Care of bedbound ventilated patient  Monitor electrolytes liver function tests  Monitor EKG for any QTC prolongation      Sathya Barillas MD  02/03/22  11:38 CST    EMR Dragon/Transcription was used to dictate part of this note

## 2022-02-03 NOTE — PROGRESS NOTES
PULMONARY AND CRITICAL CARE PROGRESS NOTE - [facility]    Patient: Eddy Ferro  1970   MR# [id number]   Acct# [id number]  02/03/22   07:30 CST  Referring Provider: Bhupinder Moss*    Chief Complaint: Acute respiratory failure.  Mechanical ventilation.  COVID-19 pneumonia  Interval history: Patient is out of Covid isolation. He remains mechanically ventilated and sedated on propofol and Precedex.  Oxygen saturation 100% on PEEP of 5 and FiO2 0.3. Assisting the vent. Per RN, does not follow commands. Lots of oral secretions. Tidal volume adequate.  ET CO2 37.  ABG reviewed and stable. No new imaging. He continues on TPN for nutrition.  Afebrile.  No new issues overnight.      Meds:  albuterol, 2.5 mg, Nebulization, Q6H - RT  ascorbic acid, 500 mg, Per PEG Tube, Daily  atorvastatin, 20 mg, Per PEG Tube, Nightly  budesonide-formoterol, 2 puff, Inhalation, BID - RT  cholecalciferol, 1,000 Units, Per PEG Tube, Daily  diphenhydrAMINE, 12.5 mg, Oral, Q6H  enoxaparin, 1 mg/kg, Subcutaneous, Q12H  erythromycin ethylsuccinate, 100 mg, Per G Tube, BID With Meals  guaiFENesin, 400 mg, Per PEG Tube, Q8H  metoclopramide, 5 mg, Intravenous, TID  metoprolol tartrate, 5 mg, Intravenous, Q6H  oxyCODONE, 5 mg, Per PEG Tube, Q6H  pantoprazole, 40 mg, Intravenous, Q12H  polyethylene glycol, 17 g, Per PEG Tube, Daily  senna-docusate sodium, 1 tablet, Per PEG Tube, BID  terazosin, 1 mg, Per PEG Tube, Nightly  thiamine, 100 mg, Per PEG Tube, Daily  zinc sulfate, 220 mg, Per PEG Tube, Daily      Adult Custom Central TPN,   Adult Custom Central TPN, , Last Rate: 90 mL/hr at 02/02/22 1727  dexmedetomidine, 0.2-1.5 mcg/kg/hr, Last Rate: 0.5 mcg/kg/hr (02/02/22 0228)  propofol, 5-75 mcg/kg/min, Last Rate: 65 mcg/kg/min (02/03/22 0451)      Review of Systems:   Review of Systems   Unable to perform ROS: Intubated     Physical Exam:  SpO2 Percentage    02/03/22 0500 02/03/22 0600 02/03/22 0645   SpO2: 97% 97% 99%      Body mass index is 29.36 kg/m².   Temp:  [97.1 °F (36.2 °C)-98.9 °F (37.2 °C)] 98.4 °F (36.9 °C)  Heart Rate:  [] 105  Resp:  [29-34] 34  BP: (103-146)/(66-93) 122/73  FiO2 (%):  [30 %] 30 %    Intake/Output Summary (Last 24 hours) at 2/3/2022 0730  Last data filed at 2/3/2022 0400  Gross per 24 hour   Intake 4243.42 ml   Output 3225 ml   Net 1018.42 ml     Physical Exam  Constitutional:       General: He is not in acute distress.     Appearance: He is ill-appearing. He is not diaphoretic.      Interventions: He is sedated.   HENT:      Head: Normocephalic.      Nose: Nose normal.      Mouth/Throat:      Mouth: Mucous membranes are moist.   Eyes:      General: No scleral icterus.  Neck:      Comments: Vent to trach  Cardiovascular:      Rate and Rhythm: Regular rhythm. Tachycardia present.      Comments: Rate 127  Pulmonary:      Effort: Pulmonary effort is normal. No respiratory distress.      Breath sounds: Rhonchi present. No wheezing.   Abdominal:      General: There is no distension.      Comments: PEG   Musculoskeletal:      Right lower leg: No edema.      Left lower leg: No edema.   Skin:     Coloration: Skin is not pale.   Neurological:      Comments: Sedated        Electronically signed by SHAHNAZ Hackett, 2/3/2022, 07:30 CST      Physician Substantive Portion: Medical Decision Making:    Laboratory Data:  Results from last 7 days   Lab Units 02/03/22  0239 02/02/22 0311 01/31/22  0140   WBC 10*3/mm3 8.92 8.28 7.54   HEMOGLOBIN g/dL 10.3* 10.0* 10.1*   PLATELETS 10*3/mm3 217 196 223     Results from last 7 days   Lab Units 02/03/22  0239 02/02/22  0311 01/31/22  0140 01/28/22  0242 01/27/22  1140   SODIUM mmol/L 137 136 138   < >  --    POTASSIUM mmol/L 4.5 3.9 4.0   < >  --    BUN mg/dL 21* 23* 17   < >  --    CREATININE mg/dL 0.42* 0.42* 0.31*   < >  --    INR   --   --   --   --  1.16*    < > = values in this interval not displayed.     Results from last 7 days   Lab Units  02/03/22  0310 02/02/22  0410 02/01/22  1046   PH, ARTERIAL pH units 7.417 7.488* 7.517*   PCO2, ARTERIAL mm Hg 43.2 39.7 38.2   PO2 ART mm Hg 97.4 78.8* 99.9   FIO2 % 30 30 30     No results found for: BLOODCX, URINECX, WOUNDCX, MRSACX, RESPCX, STOOLCX  Recent films:  XR Abdomen KUB    Result Date: 2/2/2022  No acute findings.   This report was finalized on 02/02/2022 21:04 by Dr. Andreas Dominguez MD.    Films reviewed personally by me.       My radiograph interpretation/independent review of imaging:    Pulmonary Assessment:     1. Acute respiratory failure due to COVID-19   2. On mechanical ventilation failed weaning trials  3. S/p tracheostomy placement done today.  4. SARS-COV2 viral pneumonia  5. Status post completion of remdesivir and baricitinib  6. Secondary bacterial infection with prior history of baricitinib therapy  7. Pulmonary embolism stable  8. Ventilator dyssynchrony improved  9. S/p PEG tube placement today.  10. Atrial fibrillation with rapid ventricle response     Recommend/plan:   · Continue current ventilator settings.  He is not ready for ventilator weaning yet.  · He is on PEEP of 5 and FiO2 30% oxygen saturation 97% and resting comfortably without ventilator dyssynchrony.    · He had tracheostomy placed and ENT following.  · He has atrial fibrillation with a heart rate in 160s and 70s.  Cardiology is following patient started on amiodarone drip.  · No plan for any spontaneous breathing trial due to cardiac arrhythmias we will try it later when he is more stable.  · Continue tube feedings with PEG tube when tolerated..  · Continue Symbicort.  Change albuterol to Atrovent due to cardiac arrhythmia  · Pulmonary toilet and Mucinex for mucolytic treatment.  · Continue long-term anticoagulation for pulmonary embolism.  · Antibiotics completed.  · Stress ulcer prophylaxis with Protonix.  · Patient completed dexamethasone.Continue adjunct treatment for COVID-19.  · Continue pain anxiety control and  sedation.   · He is on propofol and Precedex.  Wean as tolerated.  · TPN for nutritional support now.  Repeat labs and imaging studies as needed.  · CODE STATUS: Full  · Overall prognosis: Guarded.  · Patient may need LTAC placement due to slow weaning from the ventilator and for further rehab..  · We will follow.        This visit was performed by both a physician and an Advanced Practice RN.  I personally evaluated and examined the patient.  I performed all aspects of the medical decision making as documented.     Electronically signed by     Florence Ricks MD  Pulmonologist/Intensivist  2/3/2022 15:33 CST

## 2022-02-03 NOTE — PROGRESS NOTES
Inpatient Gastroenterology Service    Follow-up Note    Ino Wilkinson MD, FACP        Complaints  -gastric residuals remain high        Physical Exam  -GI component unchanged        Laboratory of Note  -see EMR  -electrolytes are good        Imaging of Note  -see EMR          Assessment  -impaired gastric emptying, probably worsened with PEG.  Pharmacologic interventions inadequate thus far        Recommendations  -low-dose/5 mg TID (or less frequent) Reglan IV or sub-cutaneous  -erythromycin 100 mg IV BID  -if pharmacologic measures continue to be inadequate, would consider nasoduodenal tube placement with feedings into the small bowel.  If this is well tolerated, and long-term enteral support is anticipated, would ask surgery to consider feeding jejunostomy.  -in my experience, j-tube extensions through the g-tube are not reliable, as they clog very easily, kink very easily, and pop back into the stomach quite frequently.  So I would not recommend this route.        Thank you for allowing us to participate in the care of this patient.    Sincerely,    CHARLOTTE Wilkinson MD, FACP

## 2022-02-03 NOTE — PROGRESS NOTES
[facility] Otolaryngology Head and Neck Surgery  INPATIENT PROGRESS NOTE    Patient Name: Eddy Ferro  : 1970   MRN: [id number]  Date of Admission: 2022  Today's Date: 22  Length of Stay: 23  C012/1    Bhupinder Moss*   Primary Care Physician: Provider, No Known  Surgical Procedures Since Admission:  Procedure(s):  tracheostomy  laryngoscopy  Surgeon:  Liam Bergman Jr., MD  Status:  9 Days Post-Op  -------------------    Procedure(s):  PERCUTANEOUS ENDOSCOPIC GASTROSTOMY TUBE INSERTION at bedside  Surgeon:  Ino Wilkinson MD  Status:  7 Days Post-Op  -------------------    Subjective   Subjective   Chief Complaint: Trach management  HPI   Accompanied by: Dr. Navarrete, nursing staff  Since last examined, Eddy Ferro has remained on mechanical ventilation, trach in position.  He is unable to give history  Nursing staff reports that they are decreasing his sedation.  He is tolerating decreasing sedation so far this morning.  Patient is seen, chart is reviewed    Review of Systems   No change from prior inquiry  All pertinent negatives and positives are as above. All other systems have been reviewed and are negative unless otherwise stated.   Objective   Objective   Vitals:   Temp:  [97.1 °F (36.2 °C)-98.9 °F (37.2 °C)] 98.6 °F (37 °C)  Heart Rate:  [] 122  Resp:  [29-34] 34  BP: (103-146)/(66-94) 144/94  FiO2 (%):  [30 %] 30 %  Output by Drain (mL) 22 0701 - 22 1900 22 1901 - 22 0700 22 0701 - 22 0933 Range Total   Urethral Catheter 16 Fr. 0 [phone]       Physical Exam  Constitutional:       General: He is not in acute distress.     Appearance: He is well-developed. He is obese. He is ill-appearing.      Interventions: He is sedated and intubated.      Comments: Lying in bed, mechanical ventilation, trach in position   HENT:      Head: Normocephalic and atraumatic.      Right Ear: External ear  normal.      Left Ear: External ear normal.      Nose: Nose normal.      Mouth/Throat:      Lips: Pink.      Mouth: Mucous membranes are dry.   Eyes:      General: Lids are normal.      Conjunctiva/sclera: Conjunctivae normal.   Neck:      Trachea: Tracheostomy present.      Comments: TRACHEOSTOMY SITE:    Tracheostomy tube type: Shiley #8 cuffed DIC    Stoma: Healing appropriately    Secretions: Creamy, mild to moderate    Voice: Not assessed  Date placed: 1/25/2022  Date last changed: Never  Pulmonary:      Effort: No tachypnea, respiratory distress or retractions. He is intubated.      Comments: Mechanical ventilation, trach in position  Lymphadenopathy:      Cervical: No cervical adenopathy.   Neurological:      Mental Status: He is unresponsive.   Psychiatric:      Comments: Not evaluated           Result Review    Result Review:  I have personally reviewed the results from the time of this admission to 2/3/2022 09:33 CST and agree with these findings:  [x]  Laboratory  []  Microbiology  []  Radiology  []  EKG/Telemetry   []  Cardiology/Vascular   []  Pathology  [x]  Old records  []  Other:  Most notable findings include: Anemia, mild hypocalcemia, decreased albumin  Progress Notes by Mario Hong MD (02/02/2022 07:15)   Progress Notes by Ino Wilkinson MD (02/02/2022 18:00)   Progress Notes by Bhupinder Moss MD (02/03/2022 08:20)   Comprehensive Metabolic Panel (02/03/2022 02:39)   CBC & Differential (02/03/2022 02:39)   Blood Gas, Arterial - (02/03/2022 03:10)     Assessment/Plan   Assessment / Plan   Brief Patient Summary:  Eddy Ferro is a 51 y.o. male who has remained on mechanical ventilation, trach in position.  He is currently weaning from sedation.  His trach has remained stable.  We will continue current trach care.  Patient [place]  I will follow for trach needs until that time.    Active Hospital Problems:   Active Hospital  Problems    Diagnosis    • **Pneumonia due to COVID-19 virus    • Severe malnutrition (CMS/HCC)    • Hx of tracheostomy    • Ventilator dependent (HCC)    • Acute hypoxemic respiratory failure (HCC)    • Acute respiratory distress syndrome (ARDS) due to 2019 novel coronavirus (HCC)    • Obesity (BMI 30-39.9)    • Acute subsegmental pulmonary embolism without acute cor pulmonale (HCC)      Plan:   Trach management-patient is stable trach in position.  I will continue current trach care.  Respiratory failure-the patient remains on mechanical ventilation.  He is currently undergoing decreasing sedation trials.  He is followed by the pulmonary service.  Pulmonary embolus-patient remained stable.  Per primary team  Discussed Plan with Dr. Moss and nursing staff  Following patient as in-patient. Further recommendations will be made based on serial examinations.    Medications/Orders for this encounter: No new medications ordered.  No New orders written.    Discharge Planning: Per primary team        DVT prophylaxis:  Medical and mechanical DVT prophylaxis orders are present.     Electronically signed by Liam Bergman Jr, MD, 02/03/22, 9:33 AM CST.

## 2022-02-03 NOTE — SIGNIFICANT NOTE
02/03/22 0814   Readings   Plateau Pressure (cm H2O) 23 cm H2O   Static Compliance (L/cm H2O) 42   Dynamic Compliance (L/cm H2O) 40 L/cm H2O

## 2022-02-03 NOTE — PAYOR COMM NOTE
"FROM: JIMENA GARCIA  PHONE: [phone]  FAX: [phone]    AUTH: [id number]    Eddy Chau (51 y.o. Male)             Date of Birth Social Security Number Address Home Phone MRN    1970  [address]  [facility] [address] [phone] [id number]    [employer] Marital Status             Sabianist Single       Admission Date Admission Type Admitting Provider Attending Provider Department, Room/Bed    22 Urgent Bhupinder Moss MD [place], Glenn Riego, MD [facility] CARDIAC CARE, C012/    Discharge Date Discharge Disposition Discharge Destination                         Attending Provider: Bhupinder Moss MD    Allergies: No Known Allergies    Isolation: None   Infection: COVID (History) (22)   Code Status: CPR   Advance Care Planning Activity    Ht: 177.8 cm (70\")   Wt: 92.8 kg (204 lb 9.4 oz)    Admission Cmt: None   Principal Problem: Pneumonia due to COVID-19 virus [U07.1,J12.82]                 Active Insurance as of 2022     Primary Coverage     Payor Plan Insurance Group Employer/Plan Group     [employer] COMMERCIAL HE[employer]IANCE COMMERCIAL - PAD ONLY [id number]     Payor Address Payor Phone Number Payor Fax Number Effective Dates    [address]   2022 - None Entered    [facility] [id number]       Subscriber Name Subscriber Birth Date Member ID       EDDY CHAU 1970 [id number]                 Emergency Contacts      (Rel.) Home Phone Work Phone Mobile Phone    Ayse Reynolds (Significant Other) -- -- [phone]    Carmita Chau (Daughter) [phone] -- [phone]    Arti Chau (Sister) [phone] -- --               Physician Progress Notes (last 48 hours)      Liam Bergman Jr., MD at 22 0932              [facility] Otolaryngology Head and Neck Surgery  INPATIENT PROGRESS NOTE    Patient Name: Eddy Chau  : 1970   MRN: [id number]  Date of Admission: " 1/11/2022  Today's Date: 02/03/22  Length of Stay: 23  C012/1    Bhupinder Moss*   Primary Care Physician: Provider, No Known  Surgical Procedures Since Admission:  Procedure(s):  tracheostomy  laryngoscopy  Surgeon:  Liam Bergman Jr., MD  Status:  9 Days Post-Op  -------------------    Procedure(s):  PERCUTANEOUS ENDOSCOPIC GASTROSTOMY TUBE INSERTION at bedside  Surgeon:  Ino Wilkinson MD  Status:  7 Days Post-Op  -------------------    Subjective   Subjective   Chief Complaint: Trach management  HPI   Accompanied by: Dr. Navarrete, nursing staff  Since last examined, Eddy Ferro has remained on mechanical ventilation, trach in position.  He is unable to give history  Nursing staff reports that they are decreasing his sedation.  He is tolerating decreasing sedation so far this morning.  Patient is seen, chart is reviewed    Review of Systems   No change from prior inquiry  All pertinent negatives and positives are as above. All other systems have been reviewed and are negative unless otherwise stated.   Objective   Objective   Vitals:   Temp:  [97.1 °F (36.2 °C)-98.9 °F (37.2 °C)] 98.6 °F (37 °C)  Heart Rate:  [] 122  Resp:  [29-34] 34  BP: (103-146)/(66-94) 144/94  FiO2 (%):  [30 %] 30 %  Output by Drain (mL) 02/02/22 0701 - 02/02/22 1900 02/02/22 1901 - 02/03/22 0700 02/03/22 0701 - 02/03/22 0933 Range Total   Urethral Catheter 16 Fr. 2050 [phone]       Physical Exam  Constitutional:       General: He is not in acute distress.     Appearance: He is well-developed. He is obese. He is ill-appearing.      Interventions: He is sedated and intubated.      Comments: Lying in bed, mechanical ventilation, trach in position   HENT:      Head: Normocephalic and atraumatic.      Right Ear: External ear normal.      Left Ear: External ear normal.      Nose: Nose normal.      Mouth/Throat:      Lips: Pink.      Mouth: Mucous membranes are dry.   Eyes:      General: Lids are normal.       Conjunctiva/sclera: Conjunctivae normal.   Neck:      Trachea: Tracheostomy present.      Comments: TRACHEOSTOMY SITE:    Tracheostomy tube type: Shiley #8 cuffed DIC    Stoma: Healing appropriately    Secretions: Creamy, mild to moderate    Voice: Not assessed  Date placed: 1/25/2022  Date last changed: Never  Pulmonary:      Effort: No tachypnea, respiratory distress or retractions. He is intubated.      Comments: Mechanical ventilation, trach in position  Lymphadenopathy:      Cervical: No cervical adenopathy.   Neurological:      Mental Status: He is unresponsive.   Psychiatric:      Comments: Not evaluated           Result Review    Result Review:  I have personally reviewed the results from the time of this admission to 2/3/2022 09:33 CST and agree with these findings:  [x]  Laboratory  []  Microbiology  []  Radiology  []  EKG/Telemetry   []  Cardiology/Vascular   []  Pathology  [x]  Old records  []  Other:  Most notable findings include: Anemia, mild hypocalcemia, decreased albumin  Progress Notes by Mario Hong MD (02/02/2022 07:15)   Progress Notes by Ino Wilkinson MD (02/02/2022 18:00)   Progress Notes by Bhupinder Moss MD (02/03/2022 08:20)   Comprehensive Metabolic Panel (02/03/2022 02:39)   CBC & Differential (02/03/2022 02:39)   Blood Gas, Arterial - (02/03/2022 03:10)     Assessment/Plan   Assessment / Plan   Brief Patient Summary:  Eddy Ferro is a 51 y.o. male who has remained on mechanical ventilation, trach in position.  He is currently weaning from sedation.  His trach has remained stable.  We will continue current trach care.  Patient apparently is on tap [place]  I will follow for trach needs until that time.    Active Hospital Problems:   Active Hospital Problems    Diagnosis    • **Pneumonia due to COVID-19 virus    • Severe malnutrition (CMS/HCC)    • Hx of tracheostomy    • Ventilator dependent (HCC)    • Acute hypoxemic respiratory  failure (HCC)    • Acute respiratory distress syndrome (ARDS) due to 2019 novel coronavirus (HCC)    • Obesity (BMI 30-39.9)    • Acute subsegmental pulmonary embolism without acute cor pulmonale (HCC)      Plan:   • Trach management-patient is stable trach in position.  I will continue current trach care.  • Respiratory failure-the patient remains on mechanical ventilation.  He is currently undergoing decreasing sedation trials.  He is followed by the pulmonary service.  • Pulmonary embolus-patient remained stable.  Per primary team  Discussed Plan with Dr. Moss and nursing staff  Following patient as in-patient. Further recommendations will be made based on serial examinations.    Medications/Orders for this encounter: No new medications ordered.  No New orders written.    Discharge Planning: Per primary team       DVT prophylaxis:  Medical and mechanical DVT prophylaxis orders are present.     Electronically signed by Liam Bergman Jr, MD, 02/03/22, 9:33 AM CST.      Electronically signed by Liam Bergman Jr., MD at 02/03/22 1110     Bhupinder Moss MD at 02/03/22 0820              [facility] Medicine Services  INPATIENT PROGRESS NOTE    Patient Name: Eddy Ferro  Date of Admission: 1/11/2022  Today's Date: 02/03/22  Length of Stay: 23  Primary Care Physician: Provider, No Known    Subjective   Chief Complaint: f/u   HPI   discussed last night with Dr. Wilkinson re: gastric retention.  Just had BM yesterday.   + bowel sound on exam.  KUB showed no acute findings.  Stomach is not distended.  Air predominantly right colon.  No small bowel distention.  He suggest to wait and consider jejunal tube per GI recommendation c/o radiologist   Started yesterday on diphenhydramine per algorithm on gastric retention karla HARVEYDOL    Been on erythromycin via ngt since beginning of week  Been on TPN for nutrition for over a week    Awaiting placement to LTACH    Had  issue on SVT with isolated pvc since d/c of Precedex.  Had to go up on Propofol. We increased lopresssor to 5 mg     Mg and TFT are normal  Supplemented with potassium to keep K > 4; K is 4.5    Discussed with nurse Mariah.  Sedation was turned off earlier.  He was appropriately responsive and in tears.  Sedation resumed at a lower dose to maintain synchrony with the vent.    Review of Systems   Unable to participate in care    Objective    Temp:  [97.1 °F (36.2 °C)-98.9 °F (37.2 °C)] 98.6 °F (37 °C)  Heart Rate:  [] 105  Resp:  [29-34] 34  BP: (103-146)/(66-93) 122/73  FiO2 (%):  [30 %] 30 %  Physical Exam  On TPN  Propofol running at 60mcg  Precedex has been weaned down  Patient is tachycardic at 117 at time of visit.  HEENT:  Atraumatic, Anicteric, Trachea midline, + trach,no gross secretions from tracheostomy noted; intermittent coughing spell  He is on minimal vent setting   Lungs: equal chest rise bilaterally, no vent desynchrony   Heart: Regular rate and rhythm, no gross murmur.  Maintaining sinus rhythm on telemetry but tachycardic  ABD: Soft, + PEG, no drainage  Moss catheter in place  PICC line on the left arm  Extremities: no cyanosis, no edema  Skin: no obvious rashes or petechiae  Neuro: Unable to assess at this time due to sedation  Psych: Unable to assess at this time due to sedation      Results Review:  I have reviewed the labs, radiology results, and diagnostic studies.    Laboratory Data:   Results from last 7 days   Lab Units 02/03/22 0239 02/02/22 0311 01/31/22  0140   WBC 10*3/mm3 8.92 8.28 7.54   HEMOGLOBIN g/dL 10.3* 10.0* 10.1*   HEMATOCRIT % 34.0* 33.0* 34.4*   PLATELETS 10*3/mm3 217 196 223        Results from last 7 days   Lab Units 02/03/22 0239 02/02/22  0311 01/31/22  0140   SODIUM mmol/L 137 136 138   POTASSIUM mmol/L 4.5 3.9 4.0   CHLORIDE mmol/L 104 99 101   CO2 mmol/L 27.0 29.0 30.0*   BUN mg/dL 21* 23* 17   CREATININE mg/dL 0.42* 0.42* 0.31*   CALCIUM mg/dL 8.1* 8.1* 8.3*    BILIRUBIN mg/dL 1.3* 1.2  --    ALK PHOS U/L 172* 148*  --    ALT (SGPT) U/L 53* 39  --    AST (SGOT) U/L 69* 45*  --    GLUCOSE mg/dL 100* 114* 108*       Culture Data:   No results found for: BLOODCX, URINECX, WOUNDCX, MRSACX, RESPCX, STOOLCX    Radiology Data:   Imaging Results (Last 24 Hours)     Procedure Component Value Units Date/Time    XR Abdomen KUB [[id number]] Collected: 02/02/22 2102     Updated: 02/02/22 2107    Narrative:      EXAMINATION:  XR ABDOMEN KUB-  2/2/2022 9:01 PM CST     HISTORY: Gastric retention. E43-Unspecified severe protein-calorie  malnutrition.     COMPARISON: 1/29/2022.     TECHNIQUE: Supine abdomen      FINDINGS:   The stomach is not distended. There is air predominantly in  the right colon. No small bowel distention is seen. There is a tube  overlying the stomach that may be a gastric tube. There is a Moss  catheter overlying the lower pelvis.       Impression:      No acute findings.        This report was finalized on 02/02/2022 21:04 by Dr. Andreas Dominguez MD.        BG showed pH of 7.42, PCO2 43, PaO2 of 97.  On pressure control FiO2 of 30%, rate of 14 PEEP of 5  I have reviewed the patient's current medications.     Assessment/Plan     Active Hospital Problems    Diagnosis    • **Pneumonia due to COVID-19 virus    • Severe malnutrition (CMS/HCC)    • Hx of tracheostomy    • Ventilator dependent (HCC)    • Acute hypoxemic respiratory failure (HCC)    • Acute respiratory distress syndrome (ARDS) due to 2019 novel coronavirus (HCC)    • Obesity (BMI 30-39.9)    • Acute subsegmental pulmonary embolism without acute cor pulmonale (HCC)        Problem list  · Acute hypoxic respiratory failure in the setting of Covid, PE, Klebsiella pneumonia  · PE -on Lovenox  · COVID-19  · Nutrition on TPN due to intolerance with gastric retention  · Urinary retention  · SVT     Plan:  Cont TPN; TPN until able to tolerate tube feeding.  On Erythromycin/diphenhydramine  Now off isolation  Moss  catheter for urinary retention, Hytrin  Awaiting LTAC   Discussed with nurse Rex  TFT normal, mag normal  Status post trach and PEG January 28  Had completed Decadron  Had received baricitinib, Remdesivir in outpatient setting  Had completed 7 days of ceftriaxone prior to all other antibiotics received from transferring facility      albuterol, 2.5 mg, Nebulization, Q6H - RT  ascorbic acid, 500 mg, Per PEG Tube, Daily  atorvastatin, 20 mg, Per PEG Tube, Nightly  budesonide-formoterol, 2 puff, Inhalation, BID - RT  cholecalciferol, 1,000 Units, Per PEG Tube, Daily  diphenhydrAMINE, 12.5 mg, Oral, Q6H  enoxaparin, 1 mg/kg, Subcutaneous, Q12H  erythromycin ethylsuccinate, 100 mg, Per G Tube, BID With Meals  guaiFENesin, 400 mg, Per PEG Tube, Q8H  metoclopramide, 5 mg, Intravenous, TID  metoprolol tartrate, 5 mg, Intravenous, Q6H  oxyCODONE, 5 mg, Per PEG Tube, Q6H  pantoprazole, 40 mg, Intravenous, Q12H  polyethylene glycol, 17 g, Per PEG Tube, Daily  senna-docusate sodium, 1 tablet, Per PEG Tube, BID  terazosin, 1 mg, Per PEG Tube, Nightly  thiamine, 100 mg, Per PEG Tube, Daily  zinc sulfate, 220 mg, Per PEG Tube, Daily          Discharge Planning:?    Electronically signed by Bhupinder Moss MD, 02/03/22, 08:20 CST.      Electronically signed by Bhupinder Moss MD at 02/03/22 0991     Ana María Dugan APRN at 02/03/22 0730              PULMONARY AND CRITICAL CARE PROGRESS NOTE - [facility]    Patient: Eddy Ferro  1970   MR# [id number]   Acct# [id number]  02/03/22   07:30 CST  Referring Provider: Bhupinder Moss*    Chief Complaint: Acute respiratory failure.  Mechanical ventilation.  COVID-19 pneumonia  Interval history: Patient is out of Covid isolation. He remains mechanically ventilated and sedated on propofol and Precedex.  Oxygen saturation 100% on PEEP of 5 and FiO2 0.3. Assisting the vent. Per RN, does not follow commands. Lots of oral secretions. Tidal  volume adequate.  ET CO2 37.  ABG reviewed and stable. No new imaging. He continues on TPN for nutrition.  Afebrile.  No new issues overnight.      Meds:  albuterol, 2.5 mg, Nebulization, Q6H - RT  ascorbic acid, 500 mg, Per PEG Tube, Daily  atorvastatin, 20 mg, Per PEG Tube, Nightly  budesonide-formoterol, 2 puff, Inhalation, BID - RT  cholecalciferol, 1,000 Units, Per PEG Tube, Daily  diphenhydrAMINE, 12.5 mg, Oral, Q6H  enoxaparin, 1 mg/kg, Subcutaneous, Q12H  erythromycin ethylsuccinate, 100 mg, Per G Tube, BID With Meals  guaiFENesin, 400 mg, Per PEG Tube, Q8H  metoclopramide, 5 mg, Intravenous, TID  metoprolol tartrate, 5 mg, Intravenous, Q6H  oxyCODONE, 5 mg, Per PEG Tube, Q6H  pantoprazole, 40 mg, Intravenous, Q12H  polyethylene glycol, 17 g, Per PEG Tube, Daily  senna-docusate sodium, 1 tablet, Per PEG Tube, BID  terazosin, 1 mg, Per PEG Tube, Nightly  thiamine, 100 mg, Per PEG Tube, Daily  zinc sulfate, 220 mg, Per PEG Tube, Daily      Adult Custom Central TPN,   Adult Custom Central TPN, , Last Rate: 90 mL/hr at 02/02/22 1727  dexmedetomidine, 0.2-1.5 mcg/kg/hr, Last Rate: 0.5 mcg/kg/hr (02/02/22 1407)  propofol, 5-75 mcg/kg/min, Last Rate: 65 mcg/kg/min (02/03/22 0451)      Review of Systems:   Review of Systems   Unable to perform ROS: Intubated     Physical Exam:  SpO2 Percentage    02/03/22 0500 02/03/22 0600 02/03/22 0645   SpO2: 97% 97% 99%     Body mass index is 29.36 kg/m².   Temp:  [97.1 °F (36.2 °C)-98.9 °F (37.2 °C)] 98.4 °F (36.9 °C)  Heart Rate:  [] 105  Resp:  [29-34] 34  BP: (103-146)/(66-93) 122/73  FiO2 (%):  [30 %] 30 %    Intake/Output Summary (Last 24 hours) at 2/3/2022 0730  Last data filed at 2/3/2022 0400  Gross per 24 hour   Intake 4243.42 ml   Output 3225 ml   Net 1018.42 ml     Physical Exam  Constitutional:       General: He is not in acute distress.     Appearance: He is ill-appearing. He is not diaphoretic.      Interventions: He is sedated.   HENT:      Head:  Normocephalic.      Nose: Nose normal.      Mouth/Throat:      Mouth: Mucous membranes are moist.   Eyes:      General: No scleral icterus.  Neck:      Comments: Vent to trach  Cardiovascular:      Rate and Rhythm: Regular rhythm. Tachycardia present.      Comments: Rate 127  Pulmonary:      Effort: Pulmonary effort is normal. No respiratory distress.      Breath sounds: Rhonchi present. No wheezing.   Abdominal:      General: There is no distension.      Comments: PEG   Musculoskeletal:      Right lower leg: No edema.      Left lower leg: No edema.   Skin:     Coloration: Skin is not pale.   Neurological:      Comments: Sedated        Electronically signed by SHAHNAZ Hackett, 2/3/2022, 07:30 CST      Physician Substantive Portion: Medical Decision Making:    Laboratory Data:  Results from last 7 days   Lab Units 02/03/22  0239 02/02/22  0311 01/31/22  0140   WBC 10*3/mm3 8.92 8.28 7.54   HEMOGLOBIN g/dL 10.3* 10.0* 10.1*   PLATELETS 10*3/mm3 217 196 223     Results from last 7 days   Lab Units 02/03/22  0239 02/02/22  0311 01/31/22  0140 01/28/22  0242 01/27/22  1140   SODIUM mmol/L 137 136 138   < >  --    POTASSIUM mmol/L 4.5 3.9 4.0   < >  --    BUN mg/dL 21* 23* 17   < >  --    CREATININE mg/dL 0.42* 0.42* 0.31*   < >  --    INR   --   --   --   --  1.16*    < > = values in this interval not displayed.     Results from last 7 days   Lab Units 02/03/22  0310 02/02/22  0410 02/01/22  1046   PH, ARTERIAL pH units 7.417 7.488* 7.517*   PCO2, ARTERIAL mm Hg 43.2 39.7 38.2   PO2 ART mm Hg 97.4 78.8* 99.9   FIO2 % 30 30 30     No results found for: BLOODCX, URINECX, WOUNDCX, MRSACX, RESPCX, STOOLCX  Recent films:  XR Abdomen KUB    Result Date: 2/2/2022  No acute findings.   This report was finalized on 02/02/2022 21:04 by Dr. Andreas Dominguez MD.    Films reviewed personally by me.       My radiograph interpretation/independent review of imaging:    My radiograph interpretation/independent review of imaging:                  Electronically signed by Ana María Dugan APRN at 02/03/22 1006     Ino Wilkinson MD at 02/02/22 1800          Inpatient Gastroenterology Service    Follow-up Note    Ino Wilkinson MD, FACP        Complaints  -gastric residuals remain high        Physical Exam  -GI component unchanged        Laboratory of Note  -see EMR  -electrolytes are good        Imaging of Note  -see EMR          Assessment  -impaired gastric emptying, probably worsened with PEG.  Pharmacologic interventions inadequate thus far        Recommendations  -low-dose/5 mg TID (or less frequent) Reglan IV or sub-cutaneous  -erythromycin 100 mg IV BID  -if pharmacologic measures continue to be inadequate, would consider nasoduodenal tube placement with feedings into the small bowel.  If this is well tolerated, and long-term enteral support is anticipated, would ask surgery to consider feeding jejunostomy.  -in my experience, j-tube extensions through the g-tube are not reliable, as they clog very easily, kink very easily, and pop back into the stomach quite frequently.  So I would not recommend this route.        Thank you for allowing us to participate in the care of this patient.    Sincerely,    CHARLOTTE Wilkinson MD, FACP                                Electronically signed by Ino Wilkinson MD at 02/02/22 1806     Bhupinder Moss MD at 02/02/22 1643              [facility] Emergency Medicine Services  INPATIENT PROGRESS NOTE    Patient Name: Eddy Ferro  Date of Admission: 1/11/2022  Today's Date: 02/02/22  Length of Stay: 22  Primary Care Physician: Provider, No Known    Subjective   Chief Complaint: f/u   HPI       Received morphine and ativan  We increased Lopressor IV to 5 mg  Because since  D/c of Precedex he has more episode of svt. Potasium is ok    Despite starting on erythromycin (pink in color - other meds such as Robitussin), patient still has lots of gastric drainage. He  has been on TPN for nutrition for over a week now.  Last KUB dates back on Jan 29. Non obstructive bowel gas pattern. Unclear as to cause of this.     When off sedation, he is able to follow commands and interactive as per discussion with nurse Goodman.    + good BM today per nurse  Review of Systems   Unable to participate in his care      Objective    Temp:  [97 °F (36.1 °C)-98.7 °F (37.1 °C)] 98.1 °F (36.7 °C)  Heart Rate:  [] 120  Resp:  [16-29] 29  BP: ()/(58-93) 133/93  FiO2 (%):  [30 %] 30 %  Physical Exam  On TPN  Propofol running at 70mcg  HEENT:  Atraumatic, Anicteric, Trachea midline, + trach,no gross secretions from tracheostomy noted; thick secretion out of the mouth   He is on minimal vent setting   Lungs: equal chest rise bilaterally, no vent desynchrony   Heart: Regular rate and rhythm, no gross murmur.  Maintaining sinus rhythm on telemetry  ABD: Soft, + PEG, no drainage  Moss catheter in place  PICC line on the left arm  Extremities: no cyanosis, no edema  Skin: no obvious rashes or petechiae  Neuro: Unable to assess at this time due to sedation  Psych: Unable to assess at this time due to sedation       Results Review:  I have reviewed the labs, radiology results, and diagnostic studies.    Laboratory Data:   Results from last 7 days   Lab Units 02/02/22 0311 01/31/22  0140 01/30/22  0841   WBC 10*3/mm3 8.28 7.54 8.83   HEMOGLOBIN g/dL 10.0* 10.1* 10.0*   HEMATOCRIT % 33.0* 34.4* 32.5*   PLATELETS 10*3/mm3 196 223 237        Results from last 7 days   Lab Units 02/02/22 0311 01/31/22  0140 01/29/22  0315   SODIUM mmol/L 136 138 137   POTASSIUM mmol/L 3.9 4.0 3.8   CHLORIDE mmol/L 99 101 101   CO2 mmol/L 29.0 30.0* 31.0*   BUN mg/dL 23* 17 17   CREATININE mg/dL 0.42* 0.31* 0.34*   CALCIUM mg/dL 8.1* 8.3* 8.2*   BILIRUBIN mg/dL 1.2  --   --    ALK PHOS U/L 148*  --   --    ALT (SGPT) U/L 39  --   --    AST (SGOT) U/L 45*  --   --    GLUCOSE mg/dL 114* 108* 123*       Culture Data:   No  results found for: BLOODCX, URINECX, WOUNDCX, MRSACX, RESPCX, STOOLCX    Radiology Data:   Imaging Results (Last 24 Hours)     ** No results found for the last 24 hours. **          I have reviewed the patient's current medications.     Assessment/Plan     Active Hospital Problems    Diagnosis    • **Pneumonia due to COVID-19 virus    • Severe malnutrition (CMS/HCC)    • Hx of tracheostomy    • Ventilator dependent (HCC)    • Acute hypoxemic respiratory failure (HCC)    • Acute respiratory distress syndrome (ARDS) due to 2019 novel coronavirus ([facility])    • Obesity (BMI 30-39.9)    • Acute subsegmental pulmonary embolism without acute cor pulmonale ([facility])      Problem list  · Acute hypoxic respiratory failure in the setting of Covid, PE, Klebsiella pneumonia  · PE -on Lovenox  · COVID-19  · Nutrition on TPN due to intolerance with gastric retention  · Urinary retention  · SVT     Plan:  kub  Resume precedex due to HR not tolerating (SVT and isolated pvc.)  maintain potassium > 4 and and mag > 2  Stable hgb. - despite pink output from his ngt aspirate - likely med color   Added diphenhydramine based on algorithm reviewed from [facility] re: gastroparesis  Increase lopressor IV re: arrhythmia  Status post trach and PEG January 28  Had completed Decadron  Had received baricitinib, Remdesivir in outpatient setting  Had completed 7 days of ceftriaxone prior to all other antibiotics received from transferring facility  check thyroid profile  Cont TPN  Now off isolation  TPN until able to tolerate tube feeding  Moss catheter for urinary retention, Hytrin  Awaiting LTAC   Discussed with nurse Rex    albuterol, 2.5 mg, Nebulization, Q6H - RT  ascorbic acid, 500 mg, Per PEG Tube, Daily  atorvastatin, 20 mg, Per PEG Tube, Nightly  budesonide-formoterol, 2 puff, Inhalation, BID - RT  cholecalciferol, 1,000 Units, Per PEG Tube, Daily  diphenhydrAMINE, 12.5 mg, Oral, Q6H  enoxaparin, 1 mg/kg, Subcutaneous, Q12H  erythromycin  ethylsuccinate, 100 mg, Per G Tube, BID With Meals  guaiFENesin, 400 mg, Per PEG Tube, Q8H  metoclopramide, 5 mg, Intravenous, TID  metoprolol tartrate, 5 mg, Intravenous, Q6H  oxyCODONE, 5 mg, Per PEG Tube, Q6H  pantoprazole, 40 mg, Intravenous, Q12H  polyethylene glycol, 17 g, Per PEG Tube, Daily  potassium chloride, 10 mEq, Intravenous, Once  senna-docusate sodium, 1 tablet, Per PEG Tube, BID  terazosin, 1 mg, Per PEG Tube, Nightly  thiamine, 100 mg, Per PEG Tube, Daily  zinc sulfate, 220 mg, Per PEG Tube, Daily        Discharge Planning: tbd  Electronically signed by Bhupinder Moss MD, 02/02/22, 16:43 CST.      Electronically signed by Bhupinder Moss MD at 02/02/22 1702     Mario Hong MD at 02/02/22 0715              PULMONARY AND CRITICAL CARE PROGRESS NOTE - [facility]    Patient: Eddy Ferro  1970   MR# [id number]   Acct# [id number]  02/02/22   07:45 CST  Referring Provider: Bhupinder Moss*    Chief Complaint: Acute respiratory failure.  Mechanical ventilation.  COVID-19 pneumonia  Interval history: Patient is out of Covid isolation. He remains mechanically ventilated and sedated on propofol and Precedex.  Oxygen saturation 97% on PEEP of 5 and FiO2 0.3.  Tidal volume 600s.  ET CO2 33.  Minute volume 17 L.  ABG reviewed and stable.  He continues on TPN for nutrition.  Afebrile.  No new issues overnight.      Meds:  albuterol, 2.5 mg, Nebulization, Q6H - RT  ascorbic acid, 500 mg, Per PEG Tube, Daily  atorvastatin, 20 mg, Per PEG Tube, Nightly  budesonide-formoterol, 2 puff, Inhalation, BID - RT  cholecalciferol, 1,000 Units, Per PEG Tube, Daily  enoxaparin, 1 mg/kg, Subcutaneous, Q12H  erythromycin ethylsuccinate, 100 mg, Per G Tube, BID With Meals  guaiFENesin, 400 mg, Per PEG Tube, Q8H  metoclopramide, 5 mg, Intravenous, TID  metoprolol tartrate, 2.5 mg, Intravenous, Q6H  oxyCODONE, 5 mg, Per PEG Tube, Q6H  pantoprazole, 40 mg, Intravenous,  Q12H  polyethylene glycol, 17 g, Per PEG Tube, Daily  senna-docusate sodium, 1 tablet, Per PEG Tube, BID  terazosin, 1 mg, Per PEG Tube, Nightly  thiamine, 100 mg, Per PEG Tube, Daily  zinc sulfate, 220 mg, Per PEG Tube, Daily      Adult [facility] TPN, , Last Rate: 80 mL/hr at 02/01/22 1737  [START ON 2/3/2022] Adult [employer] [facility] TPN,   Adult [employer] [facility] TPN,   dexmedetomidine, 0.2-1.5 mcg/kg/hr, Last Rate: 1.3 mcg/kg/hr (02/02/22 0740)  propofol, 5-75 mcg/kg/min, Last Rate: 55 mcg/kg/min (02/02/22 0740)      Review of Systems:   Review of Systems   Unable to perform ROS: Intubated     Physical Exam:  SpO2 Percentage    02/02/22 0600 02/02/22 0637 02/02/22 0700   SpO2: 98% 98% 97%     Body mass index is 30.15 kg/m².   Temp:  [97.6 °F (36.4 °C)-98.7 °F (37.1 °C)] 97.6 °F (36.4 °C)  Heart Rate:  [] 73  Resp:  [16-28] 18  BP: ()/(58-95) 108/77  FiO2 (%):  [30 %] 30 %    Intake/Output Summary (Last 24 hours) at 2/2/2022 0745  Last data filed at 2/2/2022 0411  Gross per 24 hour   Intake 2812.49 ml   Output 1850 ml   Net 962.49 ml     Physical Exam  Constitutional:       General: He is not in acute distress.     Appearance: He is ill-appearing. He is not diaphoretic.      Interventions: He is sedated.   HENT:      Head: Normocephalic.      Nose: Nose normal.      Mouth/Throat:      Mouth: Mucous membranes are moist.   Eyes:      General: No scleral icterus.  Neck:      Comments: Vent to trach  Cardiovascular:      Rate and Rhythm: Normal rate and regular rhythm.      Comments: Rate 71  Pulmonary:      Effort: Pulmonary effort is normal. No respiratory distress.      Breath sounds: Rhonchi present. No wheezing.   Abdominal:      General: There is no distension.      Comments: PEG   Musculoskeletal:      Right lower leg: No edema.      Left lower leg: No edema.   Skin:     Coloration: Skin is not pale.   Neurological:      Comments: Sedated        Electronically signed by SHAHNAZ Cabrera, 2/2/2022,  07:45 CST      Physician Substantive Portion: Medical Decision Making:    Laboratory Data:  Results from last 7 days   Lab Units 02/02/22  0311 01/31/22  0140 01/30/22  0841   WBC 10*3/mm3 8.28 7.54 8.83   HEMOGLOBIN g/dL 10.0* 10.1* 10.0*   PLATELETS 10*3/mm3 196 223 237     Results from last 7 days   Lab Units 02/02/22  0311 01/31/22  0140 01/29/22  0315 01/28/22  0242 01/27/22  1140   SODIUM mmol/L 136 138 137   < >  --    POTASSIUM mmol/L 3.9 4.0 3.8   < >  --    BUN mg/dL 23* 17 17   < >  --    CREATININE mg/dL 0.42* 0.31* 0.34*   < >  --    INR   --   --   --   --  1.16*    < > = values in this interval not displayed.     Results from last 7 days   Lab Units 02/02/22  0410 02/01/22  1046 02/01/22  0256   PH, ARTERIAL pH units 7.488* 7.517* 7.419   PCO2, ARTERIAL mm Hg 39.7 38.2 49.1*   PO2 ART mm Hg 78.8* 99.9 86.4   FIO2 % 30 30 30     No results found for: BLOODCX, URINECX, WOUNDCX, MRSACX, RESPCX, STOOLCX  Recent films:  No radiology results for the last day  Films reviewed personally by me.       My radiograph interpretation/independent review of imaging:    My radiograph interpretation/independent review of imaging: No films since January 29.    Pulmonary Assessment:    1. Acute respiratory failure with hypoxia due to Covid  2. Metabolic alkalosis stable  3. Tracheostomy status stable  4. Status post pulmonary embolism, stable, on treatment  5. Ventilator dyssynchrony improved on pressure control ventilation  6. Unstable  7. Metabolic process, sedated    Recommend/plan:   · Inspiratory pressure decreased to 16.  · Discontinue Precedex.  · Not ready for spontaneous breathing trials yet.  · Continue DVT and stress ulcer prophylaxis.  · As needed morphine and Ativan as needed for sedation and try to work toward awakening.    This visit was performed by both a physician and an Advanced Practice RN.  I personally evaluated and examined the patient.  I performed all aspects of the medical decision making as  documented.  Electronically signed by Mario Hong MD, 2/2/2022, 17:49 CST                  Electronically signed by Mario Hong MD at 02/02/22 8079

## 2022-02-03 NOTE — PLAN OF CARE
Goal Outcome Evaluation:  Plan of Care Reviewed With: patient           Outcome Summary: Precedex resumed previous shift.  Patient continues to awaken upon repeated stimulation, turning, and is able to follow commands.  Runs of SVT noted on monitor x2 this shift.  Ativan prn given once this shift as ordered (refer to MAR for documentation).  Sedation remained at previous rates r/t intolerance/agitation.  Safety maintained, VSS, will continue to monitor.

## 2022-02-03 NOTE — SIGNIFICANT NOTE
02/02/22 1823   Surgical Airway Cuffed 8   Placement Date/Time: 01/25/22 1850   Type: Tracheostomy  Style: Cuffed  Size (mm): 8   Status Secured   Site Assessment Clean; Dry; Bleeding  (RN told about wound on right side of stoma)   Site Care Cleansed; Dried; Protective barrier to skin   Ties Assessment Clean; Dry; Intact   At Bedside Trach; Obturator   Found a round wound on the right side of trach that is bleeding.  RN was informed.

## 2022-02-03 NOTE — PROGRESS NOTES
[facility] Medicine Services  INPATIENT PROGRESS NOTE    Patient Name: Eddy Ferro  Date of Admission: 1/11/2022  Today's Date: 02/03/22  Length of Stay: 23  Primary Care Physician: Provider, No Known    Subjective   Chief Complaint: f/u   HPI   discussed last night with Dr. Wilkinson re: gastric retention.  Just had BM yesterday.   + bowel sound on exam.  KUB showed no acute findings.  Stomach is not distended.  Air predominantly right colon.  No small bowel distention.  He suggest to wait and consider jejunal tube per GI recommendation c/o radiologist   Started yesterday on diphenhydramine per algorithm on gastric retention iper UTDOL    Been on erythromycin via ngt since beginning of week  Been on TPN for nutrition for over a week    Awaiting placement to LTACH    Had issue on SVT with isolated pvc since d/c of Precedex.  Had to go up on Propofol. We increased lopresssor to 5 mg     Mg and TFT are normal  Supplemented with potassium to keep K > 4; K is 4.5    Discussed with nurse Lemus.  Sedation was turned off earlier.  He was appropriately responsive and in tears.  Sedation resumed at a lower dose to maintain synchrony with the vent.    Review of Systems   Unable to participate in care    Objective    Temp:  [97.1 °F (36.2 °C)-98.9 °F (37.2 °C)] 98.6 °F (37 °C)  Heart Rate:  [] 105  Resp:  [29-34] 34  BP: (103-146)/(66-93) 122/73  FiO2 (%):  [30 %] 30 %  Physical Exam  On TPN  Propofol running at 60mcg  Precedex has been weaned down  Patient is tachycardic at 117 at time of visit.  HEENT:  Atraumatic, Anicteric, Trachea midline, + trach,no gross secretions from tracheostomy noted; intermittent coughing spell  He is on minimal vent setting   Lungs: equal chest rise bilaterally, no vent desynchrony   Heart: Regular rate and rhythm, no gross murmur.  Maintaining sinus rhythm on telemetry but tachycardic  ABD: Soft, + PEG, no drainage  Moss catheter in place  PICC line  on the left arm  Extremities: no cyanosis, no edema  Skin: no obvious rashes or petechiae  Neuro: Unable to assess at this time due to sedation  Psych: Unable to assess at this time due to sedation      Results Review:  I have reviewed the labs, radiology results, and diagnostic studies.    Laboratory Data:   Results from last 7 days   Lab Units 02/03/22 0239 02/02/22 0311 01/31/22  0140   WBC 10*3/mm3 8.92 8.28 7.54   HEMOGLOBIN g/dL 10.3* 10.0* 10.1*   HEMATOCRIT % 34.0* 33.0* 34.4*   PLATELETS 10*3/mm3 217 196 223        Results from last 7 days   Lab Units 02/03/22 0239 02/02/22 0311 01/31/22  0140   SODIUM mmol/L 137 136 138   POTASSIUM mmol/L 4.5 3.9 4.0   CHLORIDE mmol/L 104 99 101   CO2 mmol/L 27.0 29.0 30.0*   BUN mg/dL 21* 23* 17   CREATININE mg/dL 0.42* 0.42* 0.31*   CALCIUM mg/dL 8.1* 8.1* 8.3*   BILIRUBIN mg/dL 1.3* 1.2  --    ALK PHOS U/L 172* 148*  --    ALT (SGPT) U/L 53* 39  --    AST (SGOT) U/L 69* 45*  --    GLUCOSE mg/dL 100* 114* 108*       Culture Data:   No results found for: BLOODCX, URINECX, WOUNDCX, MRSACX, RESPCX, STOOLCX    Radiology Data:   Imaging Results (Last 24 Hours)     Procedure Component Value Units Date/Time    XR Abdomen KUB [[id number]] Collected: 02/02/22 2102     Updated: 02/02/22 2107    Narrative:      EXAMINATION:  XR ABDOMEN KUB-  2/2/2022 9:01 PM CST     HISTORY: Gastric retention. E43-Unspecified severe protein-calorie  malnutrition.     COMPARISON: 1/29/2022.     TECHNIQUE: Supine abdomen      FINDINGS:   The stomach is not distended. There is air predominantly in  the right colon. No small bowel distention is seen. There is a tube  overlying the stomach that may be a gastric tube. There is a Moss  catheter overlying the lower pelvis.       Impression:      No acute findings.        This report was finalized on 02/02/2022 21:04 by Dr. Andreas Dominguez MD.        BG showed pH of 7.42, PCO2 43, PaO2 of 97.  On pressure control FiO2 of 30%, rate of 14 PEEP of 5  I have  reviewed the patient's current medications.     Assessment/Plan     Active Hospital Problems    Diagnosis    • **Pneumonia due to COVID-19 virus    • Severe malnutrition (CMS/HCC)    • Hx of tracheostomy    • Ventilator dependent (HCC)    • Acute hypoxemic respiratory failure (HCC)    • Acute respiratory distress syndrome (ARDS) due to 2019 novel coronavirus (HCC)    • Obesity (BMI 30-39.9)    • Acute subsegmental pulmonary embolism without acute cor pulmonale (HCC)        Problem list  Acute hypoxic respiratory failure in the setting of Covid, PE, Klebsiella pneumonia  PE -on Lovenox  COVID-19  Nutrition on TPN due to intolerance with gastric retention  Urinary retention  SVT     Plan:  Cont TPN; TPN until able to tolerate tube feeding.  On Erythromycin/diphenhydramine  Now off isolation  Moss catheter for urinary retention, Hytrin  Awaiting LTAC   Discussed with nurse Rex  TFCASIMIRO normal, mag normal  Status post trach and PEG January 28  Had completed Decadron  Had received baricitinib, Remdesivir in outpatient setting  Had completed 7 days of ceftriaxone prior to all other antibiotics received from transferring facility      albuterol, 2.5 mg, Nebulization, Q6H - RT  ascorbic acid, 500 mg, Per PEG Tube, Daily  atorvastatin, 20 mg, Per PEG Tube, Nightly  budesonide-formoterol, 2 puff, Inhalation, BID - RT  cholecalciferol, 1,000 Units, Per PEG Tube, Daily  diphenhydrAMINE, 12.5 mg, Oral, Q6H  enoxaparin, 1 mg/kg, Subcutaneous, Q12H  erythromycin ethylsuccinate, 100 mg, Per G Tube, BID With Meals  guaiFENesin, 400 mg, Per PEG Tube, Q8H  metoclopramide, 5 mg, Intravenous, TID  metoprolol tartrate, 5 mg, Intravenous, Q6H  oxyCODONE, 5 mg, Per PEG Tube, Q6H  pantoprazole, 40 mg, Intravenous, Q12H  polyethylene glycol, 17 g, Per PEG Tube, Daily  senna-docusate sodium, 1 tablet, Per PEG Tube, BID  terazosin, 1 mg, Per PEG Tube, Nightly  thiamine, 100 mg, Per PEG Tube, Daily  zinc sulfate, 220 mg, Per PEG Tube,  Daily          Discharge Planning:?    Electronically signed by Bhupinder Moss MD, 02/03/22, 08:20 CST.

## 2022-02-04 ENCOUNTER — APPOINTMENT (OUTPATIENT)
Dept: CARDIOLOGY | Facility: HOSPITAL | Age: 52
End: 2022-02-04

## 2022-02-04 LAB
ARTERIAL PATENCY WRIST A: POSITIVE
ATMOSPHERIC PRESS: 757 MMHG
BASE EXCESS BLDA CALC-SCNC: 1.5 MMOL/L (ref 0–2)
BDY SITE: ABNORMAL
BH CV ECHO MEAS - LAT PEAK E' VEL: 14.7 CM/SEC
BH CV ECHO MEAS - MED PEAK E' VEL: 8.16 CM/SEC
BH CV ECHO MEAS - TR MAX VEL: 150 CM/SEC
BODY TEMPERATURE: 37 C
GLUCOSE BLDC GLUCOMTR-MCNC: 104 MG/DL (ref 70–130)
GLUCOSE BLDC GLUCOMTR-MCNC: 98 MG/DL (ref 70–130)
HCO3 BLDA-SCNC: 26.4 MMOL/L (ref 20–26)
INHALED O2 CONCENTRATION: 30 %
LEFT ATRIUM VOLUME: 53.5 CM3
Lab: ABNORMAL
MAXIMAL PREDICTED HEART RATE: 171 BPM
MODALITY: ABNORMAL
PAW @ PEAK INSP FLOW SETTING VENT: 16 CMH2O
PCO2 BLDA: 42 MM HG (ref 35–45)
PCO2 TEMP ADJ BLD: 42 MM HG (ref 35–45)
PEEP RESPIRATORY: 5 CM[H2O]
PH BLDA: 7.41 PH UNITS (ref 7.35–7.45)
PH, TEMP CORRECTED: 7.41 PH UNITS (ref 7.35–7.45)
PO2 BLDA: 104 MM HG (ref 83–108)
PO2 TEMP ADJ BLD: 104 MM HG (ref 83–108)
QT INTERVAL: 244 MS
QT INTERVAL: 344 MS
QTC INTERVAL: 385 MS
QTC INTERVAL: 456 MS
SAO2 % BLDCOA: 98.2 % (ref 94–99)
SET MECH RESP RATE: 14
STRESS TARGET HR: 145 BPM
T3FREE SERPL-MCNC: 2.59 PG/ML (ref 2–4.4)
VENTILATOR MODE: ABNORMAL

## 2022-02-04 PROCEDURE — 25010000002 ENOXAPARIN PER 10 MG: Performed by: INTERNAL MEDICINE

## 2022-02-04 PROCEDURE — 93308 TTE F-UP OR LMTD: CPT

## 2022-02-04 PROCEDURE — 93010 ELECTROCARDIOGRAM REPORT: CPT | Performed by: INTERNAL MEDICINE

## 2022-02-04 PROCEDURE — 93308 TTE F-UP OR LMTD: CPT | Performed by: INTERNAL MEDICINE

## 2022-02-04 PROCEDURE — 94799 UNLISTED PULMONARY SVC/PX: CPT

## 2022-02-04 PROCEDURE — 25010000002 LORAZEPAM PER 2 MG: Performed by: NURSE PRACTITIONER

## 2022-02-04 PROCEDURE — 25010000002 CALCIUM GLUCONATE PER 10 ML: Performed by: INTERNAL MEDICINE

## 2022-02-04 PROCEDURE — 94003 VENT MGMT INPAT SUBQ DAY: CPT

## 2022-02-04 PROCEDURE — 99232 SBSQ HOSP IP/OBS MODERATE 35: CPT | Performed by: OTOLARYNGOLOGY

## 2022-02-04 PROCEDURE — 93356 MYOCRD STRAIN IMG SPCKL TRCK: CPT | Performed by: INTERNAL MEDICINE

## 2022-02-04 PROCEDURE — 93005 ELECTROCARDIOGRAM TRACING: CPT | Performed by: INTERNAL MEDICINE

## 2022-02-04 PROCEDURE — 25010000002 MAGNESIUM SULFATE PER 500 MG OF MAGNESIUM: Performed by: INTERNAL MEDICINE

## 2022-02-04 PROCEDURE — 25010000002 AMIODARONE IN DEXTROSE 5% 360-4.14 MG/200ML-% SOLUTION: Performed by: INTERNAL MEDICINE

## 2022-02-04 PROCEDURE — 82962 GLUCOSE BLOOD TEST: CPT

## 2022-02-04 PROCEDURE — 25010000002 POTASSIUM CHLORIDE PER 2 MEQ OF POTASSIUM: Performed by: INTERNAL MEDICINE

## 2022-02-04 PROCEDURE — 25010000002 PERFLUTREN 6.52 MG/ML SUSPENSION: Performed by: INTERNAL MEDICINE

## 2022-02-04 PROCEDURE — 93321 DOPPLER ECHO F-UP/LMTD STD: CPT | Performed by: INTERNAL MEDICINE

## 2022-02-04 PROCEDURE — 25010000002 PROPOFOL 10 MG/ML EMULSION: Performed by: INTERNAL MEDICINE

## 2022-02-04 PROCEDURE — 36600 WITHDRAWAL OF ARTERIAL BLOOD: CPT

## 2022-02-04 PROCEDURE — 93321 DOPPLER ECHO F-UP/LMTD STD: CPT

## 2022-02-04 PROCEDURE — 93356 MYOCRD STRAIN IMG SPCKL TRCK: CPT

## 2022-02-04 PROCEDURE — 82803 BLOOD GASES ANY COMBINATION: CPT

## 2022-02-04 PROCEDURE — 93325 DOPPLER ECHO COLOR FLOW MAPG: CPT | Performed by: INTERNAL MEDICINE

## 2022-02-04 PROCEDURE — 25010000002 METOCLOPRAMIDE PER 10 MG: Performed by: INTERNAL MEDICINE

## 2022-02-04 PROCEDURE — 99233 SBSQ HOSP IP/OBS HIGH 50: CPT | Performed by: INTERNAL MEDICINE

## 2022-02-04 PROCEDURE — 93325 DOPPLER ECHO COLOR FLOW MAPG: CPT

## 2022-02-04 RX ORDER — METOPROLOL TARTRATE 5 MG/5ML
5 INJECTION INTRAVENOUS EVERY 6 HOURS
Status: DISCONTINUED | OUTPATIENT
Start: 2022-02-04 | End: 2022-02-09 | Stop reason: HOSPADM

## 2022-02-04 RX ADMIN — ERYTHROMYCIN ETHYLSUCCINATE 100 MG: 200 GRANULE, FOR SUSPENSION ORAL at 12:09

## 2022-02-04 RX ADMIN — ATORVASTATIN CALCIUM 20 MG: 10 TABLET, FILM COATED ORAL at 20:23

## 2022-02-04 RX ADMIN — PANTOPRAZOLE SODIUM 40 MG: 40 INJECTION, POWDER, FOR SOLUTION INTRAVENOUS at 20:23

## 2022-02-04 RX ADMIN — PROPOFOL 70 MCG/KG/MIN: 10 INJECTION, EMULSION INTRAVENOUS at 01:18

## 2022-02-04 RX ADMIN — ZINC SULFATE 220 MG (50 MG) CAPSULE 220 MG: CAPSULE at 09:17

## 2022-02-04 RX ADMIN — PROPOFOL 70 MCG/KG/MIN: 10 INJECTION, EMULSION INTRAVENOUS at 22:14

## 2022-02-04 RX ADMIN — METOPROLOL TARTRATE 5 MG: 5 INJECTION, SOLUTION INTRAVENOUS at 20:34

## 2022-02-04 RX ADMIN — AMIODARONE HYDROCHLORIDE 200 MG: 200 TABLET ORAL at 12:10

## 2022-02-04 RX ADMIN — METOCLOPRAMIDE HYDROCHLORIDE 5 MG: 5 INJECTION INTRAMUSCULAR; INTRAVENOUS at 20:24

## 2022-02-04 RX ADMIN — AMIODARONE HYDROCHLORIDE 200 MG: 200 TABLET ORAL at 16:25

## 2022-02-04 RX ADMIN — PERFLUTREN 8.48 MG: 6.52 INJECTION, SUSPENSION INTRAVENOUS at 11:35

## 2022-02-04 RX ADMIN — OXYCODONE HYDROCHLORIDE AND ACETAMINOPHEN 500 MG: 500 TABLET ORAL at 09:17

## 2022-02-04 RX ADMIN — DIPHENHYDRAMINE HYDROCHLORIDE 12.5 MG: 12.5 SOLUTION ORAL at 05:39

## 2022-02-04 RX ADMIN — GUAIFENESIN 400 MG: 100 SOLUTION ORAL at 22:26

## 2022-02-04 RX ADMIN — ERYTHROMYCIN ETHYLSUCCINATE 100 MG: 200 GRANULE, FOR SUSPENSION ORAL at 17:17

## 2022-02-04 RX ADMIN — OXYCODONE HYDROCHLORIDE 5 MG: 5 SOLUTION ORAL at 05:39

## 2022-02-04 RX ADMIN — PROPOFOL 70 MCG/KG/MIN: 10 INJECTION, EMULSION INTRAVENOUS at 08:47

## 2022-02-04 RX ADMIN — POLYETHYLENE GLYCOL 3350 17 G: 17 POWDER, FOR SOLUTION ORAL at 09:17

## 2022-02-04 RX ADMIN — POTASSIUM PHOSPHATE, MONOBASIC POTASSIUM PHOSPHATE, DIBASIC: 224; 236 INJECTION, SOLUTION, CONCENTRATE INTRAVENOUS at 17:06

## 2022-02-04 RX ADMIN — Medication 1000 UNITS: at 09:17

## 2022-02-04 RX ADMIN — PROPOFOL 70 MCG/KG/MIN: 10 INJECTION, EMULSION INTRAVENOUS at 14:06

## 2022-02-04 RX ADMIN — DOCUSATE SODIUM 50 MG AND SENNOSIDES 8.6 MG 1 TABLET: 8.6; 5 TABLET, FILM COATED ORAL at 09:17

## 2022-02-04 RX ADMIN — PROPOFOL 70 MCG/KG/MIN: 10 INJECTION, EMULSION INTRAVENOUS at 19:18

## 2022-02-04 RX ADMIN — PROPOFOL 70 MCG/KG/MIN: 10 INJECTION, EMULSION INTRAVENOUS at 16:25

## 2022-02-04 RX ADMIN — AMIODARONE HYDROCHLORIDE 0.5 MG/MIN: 1.8 INJECTION, SOLUTION INTRAVENOUS at 02:09

## 2022-02-04 RX ADMIN — PROPOFOL 70 MCG/KG/MIN: 10 INJECTION, EMULSION INTRAVENOUS at 12:08

## 2022-02-04 RX ADMIN — BUDESONIDE AND FORMOTEROL FUMARATE DIHYDRATE 2 PUFF: 160; 4.5 AEROSOL RESPIRATORY (INHALATION) at 18:43

## 2022-02-04 RX ADMIN — TERAZOSIN HYDROCHLORIDE 1 MG: 1 CAPSULE ORAL at 20:24

## 2022-02-04 RX ADMIN — PANTOPRAZOLE SODIUM 40 MG: 40 INJECTION, POWDER, FOR SOLUTION INTRAVENOUS at 09:17

## 2022-02-04 RX ADMIN — DIPHENHYDRAMINE HYDROCHLORIDE 12.5 MG: 12.5 SOLUTION ORAL at 17:04

## 2022-02-04 RX ADMIN — METOCLOPRAMIDE HYDROCHLORIDE 5 MG: 5 INJECTION INTRAMUSCULAR; INTRAVENOUS at 16:45

## 2022-02-04 RX ADMIN — ENOXAPARIN SODIUM 100 MG: 100 INJECTION SUBCUTANEOUS at 12:10

## 2022-02-04 RX ADMIN — ALBUTEROL SULFATE 2.5 MG: 2.5 SOLUTION RESPIRATORY (INHALATION) at 23:17

## 2022-02-04 RX ADMIN — PROPOFOL 70 MCG/KG/MIN: 10 INJECTION, EMULSION INTRAVENOUS at 06:20

## 2022-02-04 RX ADMIN — METOPROLOL TARTRATE 5 MG: 5 INJECTION INTRAVENOUS at 03:50

## 2022-02-04 RX ADMIN — METOPROLOL TARTRATE 5 MG: 5 INJECTION INTRAVENOUS at 12:09

## 2022-02-04 RX ADMIN — ALBUTEROL SULFATE 2.5 MG: 2.5 SOLUTION RESPIRATORY (INHALATION) at 18:42

## 2022-02-04 RX ADMIN — BUDESONIDE AND FORMOTEROL FUMARATE DIHYDRATE 2 PUFF: 160; 4.5 AEROSOL RESPIRATORY (INHALATION) at 06:22

## 2022-02-04 RX ADMIN — DOCUSATE SODIUM 50 MG AND SENNOSIDES 8.6 MG 1 TABLET: 8.6; 5 TABLET, FILM COATED ORAL at 20:35

## 2022-02-04 RX ADMIN — DEXMEDETOMIDINE HYDROCHLORIDE 0.2 MCG/KG/HR: 4 INJECTION, SOLUTION INTRAVENOUS at 08:47

## 2022-02-04 RX ADMIN — METOPROLOL TARTRATE 5 MG: 5 INJECTION INTRAVENOUS at 14:57

## 2022-02-04 RX ADMIN — GUAIFENESIN 400 MG: 100 SOLUTION ORAL at 14:57

## 2022-02-04 RX ADMIN — ALBUTEROL SULFATE 2.5 MG: 2.5 SOLUTION RESPIRATORY (INHALATION) at 06:22

## 2022-02-04 RX ADMIN — ALBUTEROL SULFATE 2.5 MG: 2.5 SOLUTION RESPIRATORY (INHALATION) at 12:41

## 2022-02-04 RX ADMIN — METOCLOPRAMIDE HYDROCHLORIDE 5 MG: 5 INJECTION INTRAMUSCULAR; INTRAVENOUS at 09:17

## 2022-02-04 RX ADMIN — LORAZEPAM 2 MG: 2 INJECTION INTRAMUSCULAR; INTRAVENOUS at 01:14

## 2022-02-04 RX ADMIN — PROPOFOL 70 MCG/KG/MIN: 10 INJECTION, EMULSION INTRAVENOUS at 03:49

## 2022-02-04 RX ADMIN — OXYCODONE HYDROCHLORIDE 5 MG: 5 SOLUTION ORAL at 12:09

## 2022-02-04 RX ADMIN — GUAIFENESIN 400 MG: 100 SOLUTION ORAL at 05:39

## 2022-02-04 RX ADMIN — METOPROLOL TARTRATE 5 MG: 5 INJECTION INTRAVENOUS at 09:16

## 2022-02-04 RX ADMIN — DIPHENHYDRAMINE HYDROCHLORIDE 12.5 MG: 12.5 SOLUTION ORAL at 12:09

## 2022-02-04 RX ADMIN — OXYCODONE HYDROCHLORIDE 5 MG: 5 SOLUTION ORAL at 17:04

## 2022-02-04 RX ADMIN — THIAMINE HCL TAB 100 MG 100 MG: 100 TAB at 09:17

## 2022-02-04 NOTE — PROGRESS NOTES
LOS: 24 days   Patient Care Team:  Provider, No Known as PCP - General    Chief Complaint: Shortness of breath and respiratory failure with paroxysmal atrial fibrillation     Moshe Ferro is a 51 y.o. male who is being seen in follow-up.  Overnight stable  Does not appear to have any new events or occurrences now in sinus rhythm  On low-dose IV amiodarone infusion  Hemodynamically stable  Latest test results reviewed  No family member by bedside  Continues on mechanical ventilation  Saturating well    Telemetry: no malignant arrhythmia. No significant pauses.  In sinus rhythm now    Review of Systems       Unable to perform as intubated  Cannot obtain due to mechanical ventilation.  The patient notably is critically ill and connected to a ventilator.  As such patient cannot communicate and provide any history whatsoever, including any history of present illness or interval history since arrival or review of systems. The interested reviewer may note this fact, as an attempt has been made at collecting and documenting these portions of the patient history, but this information is unobtainable despite attempted review and therefore cannot be documented at this time.         History:   Past Medical History:   Diagnosis Date   • Murray's esophagus    • GERD (gastroesophageal reflux disease)    • Hypertension      Past Surgical History:   Procedure Laterality Date   • KNEE SURGERY     • LARYNGOSCOPY N/A 1/25/2022    Procedure: laryngoscopy;  Surgeon: Liam Bergman Jr., MD;  Location: [facility] OR;  Service: ENT;  Laterality: N/A;   • PEG TUBE INSERTION N/A 1/27/2022    Procedure: PERCUTANEOUS ENDOSCOPIC GASTROSTOMY TUBE INSERTION at bedside;  Surgeon: Ino Wilkinson MD;  Location: [facility] ENDOSCOPY;  Service: Gastroenterology;  Laterality: N/A;  preop; peg placement   postop; peg placement   PCP none   • SPINE SURGERY     • TRACHEOSTOMY N/A 1/25/2022    Procedure: tracheostomy;  Surgeon:  Liam Bergman Jr., MD;  Location: [facility] OR;  Service: ENT;  Laterality: N/A;     Social History     Socioeconomic History   • Marital status: Single   Tobacco Use   • Smoking status: Former Smoker   Substance and Sexual Activity   • Alcohol use: Yes     Comment: rare     Family History   Problem Relation Age of Onset   • Stroke Mother    • Cancer Father        Labs:  WBC WBC   Date Value Ref Range Status   02/03/2022 8.92 3.40 - 10.80 10*3/mm3 Final   02/02/2022 8.28 3.40 - 10.80 10*3/mm3 Final      HGB Hemoglobin   Date Value Ref Range Status   02/03/2022 10.3 (L) 13.0 - 17.7 g/dL Final   02/02/2022 10.0 (L) 13.0 - 17.7 g/dL Final      HCT Hematocrit   Date Value Ref Range Status   02/03/2022 34.0 (L) 37.5 - 51.0 % Final   02/02/2022 33.0 (L) 37.5 - 51.0 % Final      Platelets Platelets   Date Value Ref Range Status   02/03/2022 217 140 - 450 10*3/mm3 Final   02/02/2022 196 140 - 450 10*3/mm3 Final      MCV MCV   Date Value Ref Range Status   02/03/2022 83.5 79.0 - 97.0 fL Final   02/02/2022 83.1 79.0 - 97.0 fL Final        Results from last 7 days   Lab Units 02/03/22  0239 02/02/22  0311 01/31/22  0140   SODIUM mmol/L 137 136 138   POTASSIUM mmol/L 4.5 3.9 4.0   CHLORIDE mmol/L 104 99 101   CO2 mmol/L 27.0 29.0 30.0*   BUN mg/dL 21* 23* 17   CREATININE mg/dL 0.42* 0.42* 0.31*   CALCIUM mg/dL 8.1* 8.1* 8.3*   BILIRUBIN mg/dL 1.3* 1.2  --    ALK PHOS U/L 172* 148*  --    ALT (SGPT) U/L 53* 39  --    AST (SGOT) U/L 69* 45*  --    GLUCOSE mg/dL 100* 114* 108*     Lab Results   Component Value Date    CKTOTAL 74 01/23/2022    TROPONINT 0.018 01/11/2022     PT/INR:  No results found for: PROTIME/No results found for: INR    Imaging Results (Last 72 Hours)     Procedure Component Value Units Date/Time    XR Abdomen KUB [[id number]] Collected: 02/02/22 2102     Updated: 02/02/22 2107    Narrative:      EXAMINATION:  XR ABDOMEN KUB-  2/2/2022 9:01 PM CST     HISTORY: Gastric retention. E43-Unspecified severe  protein-calorie  malnutrition.     COMPARISON: 1/29/2022.     TECHNIQUE: Supine abdomen      FINDINGS:   The stomach is not distended. There is air predominantly in  the right colon. No small bowel distention is seen. There is a tube  overlying the stomach that may be a gastric tube. There is a Moss  catheter overlying the lower pelvis.       Impression:      No acute findings.        This report was finalized on 02/02/2022 21:04 by Dr. Andreas Dominguez MD.          Objective     No Known Allergies    Medication Review: Performed  Current Facility-Administered Medications   Medication Dose Route Frequency Provider Last Rate Last Admin   • acetaminophen (TYLENOL) 160 MG/5ML solution 650 mg  650 mg Per PEG Tube Q6H PRN Lane Cowan DO   650 mg at 01/31/22 2254   • acetaminophen (TYLENOL) suppository 650 mg  650 mg Rectal Q6H PRN Sumeet Laughlin DO   650 mg at 01/17/22 1434   • [START ON 2/5/2022] Adult Custom Central TPN   Intravenous Once per day on Sun Tue Thu Sat Bhupinder Moss MD       • Adult Custom Central TPN   Intravenous Once per day on Mon Wed Fri Bhupinder Moss MD       • albuterol (PROVENTIL) nebulizer solution 0.083% 2.5 mg/3mL  2.5 mg Nebulization Q6H - RT Mario Hong MD   2.5 mg at 02/04/22 0622   • amiodarone 360 mg in 200 mL D5W infusion  0.5 mg/min Intravenous Continuous Sathya Barillas MD 16.67 mL/hr at 02/04/22 0209 0.5 mg/min at 02/04/22 0209    Followed by   • amiodarone (PACERONE) tablet 200 mg  200 mg Oral Once Sathya Barillas MD        Followed by   • amiodarone (PACERONE) tablet 200 mg  200 mg Oral Q8H Sathya Barillas MD        Followed by   • [START ON 2/11/2022] amiodarone (PACERONE) tablet 200 mg  200 mg Oral Q12H Sathya Barillas MD        Followed by   • [START ON 2/25/2022] amiodarone (PACERONE) tablet 200 mg  200 mg Oral Daily Sathya Barillas MD       • ascorbic acid (VITAMIN C) tablet 500 mg  500 mg Per PEG Tube Daily Lane Cowan,    500 mg at  02/04/22 0917   • atorvastatin (LIPITOR) tablet 20 mg  20 mg Per PEG Tube Nightly Lane Cowan DO   20 mg at 02/03/22 2131   • bisacodyl (DULCOLAX) suppository 10 mg  10 mg Rectal Daily PRN Sumeet Laughlin DO   10 mg at 01/29/22 1149   • budesonide-formoterol (SYMBICORT) 160-4.5 MCG/ACT inhaler 2 puff  2 puff Inhalation BID - RT Sumeet Laughlin DO   2 puff at 02/04/22 0622   • cholecalciferol (VITAMIN D3) tablet 1,000 Units  1,000 Units Per PEG Tube Daily Lane Cowan DO   1,000 Units at 02/04/22 0917   • dexmedetomidine (PRECEDEX) 400 mcg in 100 mL NS infusion  0.2-1.5 mcg/kg/hr Intravenous Titrated Bhupinder Moss MD 4.8 mL/hr at 02/04/22 0847 0.2 mcg/kg/hr at 02/04/22 0847   • dextromethorphan polistirex ER (DELSYM) 30 MG/5ML oral suspension 60 mg  60 mg Per PEG Tube Q12H PRN Lane Cowan DO   60 mg at 01/31/22 2253   • diphenhydrAMINE (BENADRYL) 12.5 MG/5ML elixir 12.5 mg  12.5 mg Oral Q6H Bhupinder Moss MD   12.5 mg at 02/04/22 0539   • enoxaparin (LOVENOX) syringe 100 mg  1 mg/kg Subcutaneous Q12H Lane Cowan DO   100 mg at 02/03/22 2342   • erythromycin ethylsuccinate (EES) 200 MG/5ML suspension 100 mg  100 mg Per G Tube BID With Meals Bhupinder Moss MD   100 mg at 02/03/22 1714   • guaiFENesin (ROBITUSSIN) 100 MG/5ML oral solution 400 mg  400 mg Per PEG Tube Q8H Lane Cowan DO   400 mg at 02/04/22 0539   • hydrocortisone-bacitracin-zinc oxide-nystatin (MAGIC BARRIER) ointment 1 application  1 application Topical PRN Sumeet Laughlin DO       • LORazepam (ATIVAN) injection 2 mg  2 mg Intravenous Q4H PRN Kristin Barry APRN   2 mg at 02/04/22 0114   • metoclopramide (REGLAN) injection 5 mg  5 mg Intravenous TID Bhupinder Moss MD   5 mg at 02/04/22 0917   • metoprolol tartrate (LOPRESSOR) injection 5 mg  5 mg Intravenous Q4H Bhupinder Moss MD   5 mg at 02/04/22 0916   • Morphine sulfate (PF) injection 2 mg  2 mg  "Intravenous Q6H PRN Bhupinder Moss MD   2 mg at 02/03/22 2150   • ondansetron (ZOFRAN) tablet 4 mg  4 mg Oral Q6H PRN Sumeet Laughlin DO        Or   • ondansetron (ZOFRAN) injection 4 mg  4 mg Intravenous Q6H PRN Sumeet Laughlin DO       • oxyCODONE (ROXICODONE) 5 MG/5ML solution 5 mg  5 mg Per PEG Tube Q6H Lane Cowan DO   5 mg at 02/04/22 0539   • pantoprazole (PROTONIX) injection 40 mg  40 mg Intravenous Q12H Sumeet Laughlin DO   40 mg at 02/04/22 0917   • polyethylene glycol (MIRALAX) packet 17 g  17 g Per PEG Tube Daily Lane Cowan DO   17 g at 02/04/22 0917   • propofol (DIPRIVAN) infusion 10 mg/mL 100 mL  5-75 mcg/kg/min Intravenous Titrated Sumeet Laughlin DO 38.1 mL/hr at 02/04/22 0847 70 mcg/kg/min at 02/04/22 0847   • sennosides-docusate (PERICOLACE) 8.6-50 MG per tablet 1 tablet  1 tablet Per PEG Tube BID Lane Cowan DO   1 tablet at 02/04/22 0917   • sodium chloride 0.9 % flush 10 mL  10 mL Intravenous PRN Sumeet Laughlin DO   10 mL at 02/01/22 2126   • terazosin (HYTRIN) capsule 1 mg  1 mg Per PEG Tube Nightly Lane Cowan DO   1 mg at 02/03/22 2130   • thiamine (VITAMIN B-1) tablet 100 mg  100 mg Per PEG Tube Daily Lane Cowan DO   100 mg at 02/04/22 0917   • zinc sulfate (ZINCATE) capsule 220 mg  220 mg Per PEG Tube Daily Lane Cowan DO   220 mg at 02/04/22 0917       Vital Sign Min/Max for last 24 hours  Temp  Min: 97.5 °F (36.4 °C)  Max: 99.2 °F (37.3 °C)   BP  Min: 101/65  Max: 177/136   Pulse  Min: 81  Max: 193   Resp  Min: 30  Max: 36   SpO2  Min: 95 %  Max: 100 %   No data recorded   Weight  Min: 93.2 kg (205 lb 7.5 oz)  Max: 93.2 kg (205 lb 7.5 oz)     Flowsheet Rows      First Filed Value   Admission Height 177.8 cm (70\") Documented at 01/11/2022 1815   Admission Weight 108 kg (237 lb 14 oz) Documented at 01/11/2022 1815          Results for orders placed during the hospital encounter of 01/11/22    Adult Transthoracic Echo " Complete W/ Cont if Necessary Per Protocol    Interpretation Summary  · Calculated left ventricular EF = 60% Estimated left ventricular EF was in agreement with the calculated left ventricular EF. Left ventricular systolic function is normal.  · Left ventricular diastolic function was normal.  · The right ventricular cavity is mildly dilated.  · The right atrial cavity is mildly dilated.      Physical Exam:    General Appearance: Intubated mechanically ventilated  Eyes: Pupils equal and reactive    Ears: Appear intact with no abnormalities noted  Nose: Nares normal, no drainage  Neck: supple, trachea midline, no carotid bruit and no JVD  Back: no kyphosis present,    Lungs: respirations regular, respirations even and respirations unlabored  Heart: normal S1, S2, 2/6 systolic murmur left sternal border  No gallops or rubs  no rub and no click  Abdomen: Soft  Skin: no bleeding, bruising or rash  Extremities: no cyanosis  Psychiatric/Behavioral: Intubated mechanically ventilated      Results Review:   I reviewed the patient's new clinical results.  I reviewed the patient's new imaging results and agree with the interpretation.  I reviewed the patient's other test results and agree with the interpretation  I personally viewed and interpreted the patient's EKG/Telemetry data       Reviewed available prior notes, consults, prior visits, laboratory findings, radiology and cardiology relevant reports.   Updated chart as applicable.   I have reviewed the patient's medical history in detail and updated the computerized patient record as relevant.          Assessment/Plan       Pneumonia due to COVID-19 virus    Acute hypoxemic respiratory failure (HCC)    Acute respiratory distress syndrome (ARDS) due to 2019 novel coronavirus (HCC)    Obesity (BMI 30-39.9)    Acute subsegmental pulmonary embolism without acute cor pulmonale (HCC)    Ventilator dependent (HCC)    Hx of tracheostomy    Severe malnutrition  (CMS/[facility])      Plan    Amiodarone therapy per protocol  Currently on 0.5 mg/min IV infusion  Can switch to oral through NG tube  Maintaining sinus rhythm  Monitor RV size and function  We will repeat limited echocardiogram for LV and RV size and function as well as pulmonary pressures  Continue treatment of bedbound ventilator dependent patient  We will follow up      Sathya Barillas MD  02/04/22  10:20 CST    EMR Dragon/Transcription was used to dictate part of this note

## 2022-02-04 NOTE — SIGNIFICANT NOTE
02/04/22 0809   Readings   Plateau Pressure (cm H2O) 11 cm H2O   Static Compliance (L/cm H2O) 35   Dynamic Compliance (L/cm H2O) 40 L/cm H2O

## 2022-02-04 NOTE — PROGRESS NOTES
PULMONARY AND CRITICAL CARE PROGRESS NOTE - [facility]    Patient: Eddy Ferro  1970   MR# [id number]   Acct# [id number]  02/04/22   09:53 CST  Referring Provider: Bhupinder Moss*    Chief Complaint: Acute respiratory failure.  Mechanical ventilation.  COVID-19 pneumonia  Interval history: Patient is out of Covid isolation. He remains mechanically ventilated and sedated on propofol and Precedex.  Oxygen saturation 98% on PEEP of 5 and FiO2 0.3 ET CO2 36.  Plateau pressure 11.  Tidal volumes 550.  ABG reviewed and stable.  He continues on amiodarone drip.  Afebrile.  No new events overnight.      Meds:  albuterol, 2.5 mg, Nebulization, Q6H - RT  amiodarone, 200 mg, Oral, Once   Followed by  amiodarone, 200 mg, Oral, Q8H   Followed by  [START ON 2/11/2022] amiodarone, 200 mg, Oral, Q12H   Followed by  [START ON 2/25/2022] amiodarone, 200 mg, Oral, Daily  ascorbic acid, 500 mg, Per PEG Tube, Daily  atorvastatin, 20 mg, Per PEG Tube, Nightly  budesonide-formoterol, 2 puff, Inhalation, BID - RT  cholecalciferol, 1,000 Units, Per PEG Tube, Daily  diphenhydrAMINE, 12.5 mg, Oral, Q6H  enoxaparin, 1 mg/kg, Subcutaneous, Q12H  erythromycin ethylsuccinate, 100 mg, Per G Tube, BID With Meals  guaiFENesin, 400 mg, Per PEG Tube, Q8H  metoclopramide, 5 mg, Intravenous, TID  metoprolol tartrate, 5 mg, Intravenous, Q4H  oxyCODONE, 5 mg, Per PEG Tube, Q6H  pantoprazole, 40 mg, Intravenous, Q12H  polyethylene glycol, 17 g, Per PEG Tube, Daily  senna-docusate sodium, 1 tablet, Per PEG Tube, BID  terazosin, 1 mg, Per PEG Tube, Nightly  thiamine, 100 mg, Per PEG Tube, Daily  zinc sulfate, 220 mg, Per PEG Tube, Daily      Adult Custom Central TPN, , Last Rate: 90 mL/hr at 02/03/22 1714  Adult Custom Central TPN, , Last Rate: 90 mL/hr at 02/02/22 1727  amiodarone, 0.5 mg/min, Last Rate: 0.5 mg/min (02/04/22 0209)  dexmedetomidine, 0.2-1.5 mcg/kg/hr, Last Rate: 0.2 mcg/kg/hr (02/04/22 0847)  propofol, 5-75  mcg/kg/min, Last Rate: 70 mcg/kg/min (02/04/22 0847)      Review of Systems:   Review of Systems   Unable to perform ROS: Intubated     Physical Exam:  SpO2 Percentage    02/04/22 0800 02/04/22 0808 02/04/22 0900   SpO2: 97% 97% 97%     Body mass index is 29.48 kg/m².   Temp:  [97.5 °F (36.4 °C)-99.2 °F (37.3 °C)] 98.1 °F (36.7 °C)  Heart Rate:  [] 91  Resp:  [30-36] 33  BP: (101-177)/() 106/65  FiO2 (%):  [30 %] 30 %    Intake/Output Summary (Last 24 hours) at 2/4/2022 0953  Last data filed at 2/4/2022 0847  Gross per 24 hour   Intake 3895 ml   Output 3925 ml   Net -30 ml     Physical Exam  Constitutional:       General: He is not in acute distress.     Appearance: He is ill-appearing. He is not diaphoretic.      Interventions: He is sedated.   HENT:      Head: Normocephalic.      Nose: Nose normal.      Mouth/Throat:      Mouth: Mucous membranes are moist.   Eyes:      General: No scleral icterus.  Neck:      Comments: Vent to trach  Cardiovascular:      Rate and Rhythm: Tachycardia present. Rhythm irregular.      Comments: Rate 101  Pulmonary:      Effort: Pulmonary effort is normal. No respiratory distress.      Breath sounds: Rhonchi present. No wheezing.   Abdominal:      General: There is no distension.      Comments: PEG   Musculoskeletal:      Right lower leg: No edema.      Left lower leg: No edema.   Skin:     Coloration: Skin is not pale.   Neurological:      Comments: Sedated        Electronically signed by SHAHNAZ Cabrera, 2/4/2022, 09:53 CST      Physician Substantive Portion: Medical Decision Making:    Laboratory Data:  Results from last 7 days   Lab Units 02/03/22  0239 02/02/22  0311 01/31/22  0140   WBC 10*3/mm3 8.92 8.28 7.54   HEMOGLOBIN g/dL 10.3* 10.0* 10.1*   PLATELETS 10*3/mm3 217 196 223     Results from last 7 days   Lab Units 02/03/22  0239 02/02/22  0311 01/31/22  0140   SODIUM mmol/L 137 136 138   POTASSIUM mmol/L 4.5 3.9 4.0   BUN mg/dL 21* 23* 17   CREATININE mg/dL  0.42* 0.42* 0.31*     Results from last 7 days   Lab Units 02/04/22  0305 02/03/22  0310 02/02/22  0410   PH, ARTERIAL pH units 7.407 7.417 7.488*   PCO2, ARTERIAL mm Hg 42.0 43.2 39.7   PO2 ART mm Hg 104.0 97.4 78.8*   FIO2 % 30 30 30     No results found for: BLOODCX, URINECX, WOUNDCX, MRSACX, RESPCX, STOOLCX  Recent films:  XR Abdomen KUB    Result Date: 2/2/2022  No acute findings.   This report was finalized on 02/02/2022 21:04 by Dr. Andreas Dominguez MD.    Films reviewed personally by me.       My radiograph interpretation/independent review of imaging: No new imaging studies today.       Pulmonary Assessment:     1. Acute respiratory failure due to COVID-19   2. On mechanical ventilation failed weaning trials  3. S/p tracheostomy placement done today.  4. SARS-COV2 viral pneumonia  5. Status post completion of remdesivir and baricitinib  6. Secondary bacterial infection with prior history of baricitinib therapy  7. Pulmonary embolism stable  8. Ventilator dyssynchrony improved  9. S/p PEG tube placement today.  10. Atrial fibrillation with rapid ventricle response     Recommend/plan:   · Reviewed current ventilator settings and we'll continue the current ventilator settings.  · He is hemodynamically stable and still on amiodarone drip for atrial fibrillation.  Rate well controlled.  Cardiology following.  · He is on PEEP of 5 and FiO2 30% oxygen saturation 97% and resting comfortably without ventilator dyssynchrony.    · He had tracheostomy placed and ENT following.  · No plan for any spontaneous breathing trial due to cardiac arrhythmias we will try it later when he is more stable.  · Continue tube feedings with PEG tube when tolerated..  · Continue Symbicort.  Change albuterol to Atrovent due to cardiac arrhythmia  · Pulmonary toilet and Mucinex for mucolytic treatment.  · Continue long-term anticoagulation for pulmonary embolism.  · Antibiotics completed.  · Stress ulcer prophylaxis with  Protonix.  · Patient completed dexamethasone.Continue adjunct treatment for COVID-19.  · Continue pain anxiety control and sedation.   · He is on propofol and Precedex.  Wean as tolerated.  · TPN for nutritional support now.  Repeat labs and imaging studies as needed.  · CODE STATUS: Full  · Overall prognosis: Guarded.  · He is awaiting for LTAC placement.  · We will follow.        This visit was performed by both a physician and an [facility] RN.  I personally evaluated and examined the patient.  I performed all aspects of the medical decision making as documented.     Electronically signed by     Florence Ricks MD  Pulmonologist/Intensivist  2/4/2022 17:07 CST

## 2022-02-04 NOTE — CASE MANAGEMENT/SOCIAL WORK
Continued Stay Note  [facility]     Patient Name: Eddy Ferro  MRN: [id number]  Today's Date: 2/4/2022    Admit Date: 1/11/2022     Discharge Plan     Row Name 02/04/22 0908       Plan    Plan Eventual LTAC placement.    Plan Comments Spoke with Marquita with [facility] this am.  Marquita plans to follow patient's progress over weekend.  Follow up with LTAC provider on Monday to assess appropriateness for transfer and bed availability.               Discharge Codes    No documentation.                     MAURY Das

## 2022-02-04 NOTE — PROGRESS NOTES
[facility]  INPATIENT PROGRESS NOTE    Patient Name: Eddy Ferro  Date of Admission: 1/11/2022  Today's Date: 02/04/22  Length of Stay: 24  Primary Care Physician: Provider, No Known    Subjective   Chief Complaint: Follow-up   HPI   Patient continued to have episodes of SVT yesterday.  Precedex has been resumed  Cardiology consulted  Amiodarone drip started as of yesterday  Had bowel movement today.  Been on erythromycin  Awaiting LTAC approval  Discussed with nurse Hong  Review of Systems   Unable to participate in his care    Objective    Temp:  [97.5 °F (36.4 °C)-99.2 °F (37.3 °C)] 97.5 °F (36.4 °C)  Heart Rate:  [] 91  Resp:  [30-36] 33  BP: (101-177)/() 106/65  FiO2 (%):  [30 %] 30 %  Physical Exam  Patient remained mechanically ventilated with pressure control, rate of 14, and a O2 of 30 and PEEP of 5, oxygen saturation 98%  Hemodynamically stable with blood pressure 110/65  On TPN  Propofol & Precedex     ongoing   Heart rate is sinus at rate of 97 on amiodarone drip going at 0.5 mg  HEENT:  Atraumatic, Anicteric, Trachea midline, + trach, minimal secretionns from tracheostomy noted; intermittent coughing spell  He is on minimal vent setting   Lungs: equal chest rise bilaterally, no vent desynchrony   Heart: Regular rate and rhythm, no gross murmur.  Maintaining sinus rhythm on telemetry  ABD: Soft, + PEG, no drainage  Moss catheter in place  PICC line on the left arm  Extremities: no cyanosis, no edema  Skin: no obvious rashes or petechiae  Neuro: Unable to assess at this time due to sedation  Psych: Unable to assess at this time due to sedation     Results Review:  I have reviewed the labs, radiology results, and diagnostic studies.    Laboratory Data:   Results from last 7 days   Lab Units 02/03/22  0239 02/02/22  0311 01/31/22  0140   WBC 10*3/mm3 8.92 8.28 7.54   HEMOGLOBIN g/dL 10.3* 10.0* 10.1*   HEMATOCRIT % 34.0* 33.0* 34.4*    PLATELETS 10*3/mm3 217 196 223        Results from last 7 days   Lab Units 02/03/22  0239 02/02/22  0311 01/31/22  0140   SODIUM mmol/L 137 136 138   POTASSIUM mmol/L 4.5 3.9 4.0   CHLORIDE mmol/L 104 99 101   CO2 mmol/L 27.0 29.0 30.0*   BUN mg/dL 21* 23* 17   CREATININE mg/dL 0.42* 0.42* 0.31*   CALCIUM mg/dL 8.1* 8.1* 8.3*   BILIRUBIN mg/dL 1.3* 1.2  --    ALK PHOS U/L 172* 148*  --    ALT (SGPT) U/L 53* 39  --    AST (SGOT) U/L 69* 45*  --    GLUCOSE mg/dL 100* 114* 108*       Culture Data:   No results found for: BLOODCX, URINECX, WOUNDCX, MRSACX, RESPCX, STOOLCX    Radiology Data:   Imaging Results (Last 24 Hours)     ** No results found for the last 24 hours. **          I have reviewed the patient's current medications.     Assessment/Plan     Active Hospital Problems    Diagnosis    • **Pneumonia due to COVID-19 virus    • Severe malnutrition (CMS/HCC)    • Hx of tracheostomy    • Ventilator dependent (HCC)    • Acute hypoxemic respiratory failure (HCC)    • Acute respiratory distress syndrome (ARDS) due to 2019 novel coronavirus (HCC)    • Obesity (BMI 30-39.9)    • Acute subsegmental pulmonary embolism without acute cor pulmonale (HCC)        Problem list  Acute hypoxic respiratory failure in the setting of Covid, PE, Klebsiella pneumonia  PE -on Lovenox  COVID-19  Nutrition on TPN due to intolerance with gastric retention  Urinary retention  SVT     Plan:  Cont TPN; TPN until able to tolerate tube feeding.  On Erythromycin/diphenhydramine  Now off isolation  Moss catheter for urinary retention, Hytrin  Awaiting LTAC   Discussed with nurse Goodman  TFT normal, mag normal  Status post trach and PEG January 28  Had completed Decadron  Had received baricitinib, Remdesivir in outpatient setting  Had completed 7 days of ceftriaxone prior to all other antibiotics received from transferring facility  Now been having bowel movement day 2    Discussed with nurse Hong at bedside  No added recommendation  No new  labs today      Discharge Planning: ?    Electronically signed by Bhupinder Moss MD, 02/04/22, 09:25 CST.

## 2022-02-04 NOTE — PLAN OF CARE
Goal Outcome Evaluation:  Plan of Care Reviewed With: other (see comments)        Progress: no change  Outcome Summary: Ntn follow up. PEG tube feeding cont to be on hold d/t persistent elevated residuals with each attempt at administration. Per GI, it should be considered to place a nasoduodenal tube. If he tolerates this, surgery could be asked to place a jejunostomy tube for enteral feeding. GI does not recommend a J-tube extension through PEG tube d/t being unreliable.    At present, pt is receiving all nutrition via custom TPN. He is receiving D10% Aa7% at 90mL/hour. No lipids ordered as pt is on propofol. TPN rate was increased per reviewing RD last review to optimize nutrition. Based on current nutrient needs and vent settings, recommend to decrease rate back to 80mL/hour tonight with new TPN bag. Recommend rate decrease to not overfeed calories and to keep TPN volume under 2000mL. Custom TPN at 80 will meet 101%  of est'd calorie needs and 88% of protein needs. Will cont to follow for nutrition plans.

## 2022-02-04 NOTE — PROGRESS NOTES
[facility] Otolaryngology Head and Neck Surgery  INPATIENT PROGRESS NOTE    Patient Name: Eddy Ferro  : 1970   MRN: [id number]  Date of Admission: 2022  Today's Date: 22  Length of Stay: 24  C012/1    Bhupinder Moss*   Primary Care Physician: Provider, No Known  Surgical Procedures Since Admission:  Procedure(s):  tracheostomy  laryngoscopy  Surgeon:  Liam Bergman Jr., MD  Status:  10 Days Post-Op  -------------------    Procedure(s):  PERCUTANEOUS ENDOSCOPIC GASTROSTOMY TUBE INSERTION at bedside  Surgeon:  Ino Wilkinson MD  Status:  8 Days Post-Op  -------------------    Subjective   Subjective   Chief Complaint: Trach management  HPI   Accompanied by: Nursing staff  Since last examined, Eddy Ferro has remained on mechanical ventilation, trach in position. He remains sedated. He is unable to give history.  RT reports no objective problems with the tracheostomy tube. He remains on sedation.  Patient seen, chart is reviewed    Review of Systems   No change from prior inquiry  All pertinent negatives and positives are as above. All other systems have been reviewed and are negative unless otherwise stated.   Objective   Objective   Vitals:   Temp:  [97.5 °F (36.4 °C)-99.2 °F (37.3 °C)] 97.8 °F (36.6 °C)  Heart Rate:  [] 96  Resp:  [30-33] 33  BP: (101-177)/() 134/90  FiO2 (%):  [30 %] 30 %  Output by Drain (mL) 22 0701 - 22 1900 22 1901 - 22 0700 22 0701 - 22 1253 Range Total   Urethral Catheter 16 Fr.  1925 1350 5300       Physical Exam  Constitutional:       General: He is not in acute distress.     Appearance: He is well-developed. He is obese. He is ill-appearing.      Interventions: He is sedated and intubated.      Comments: Lying in bed, mechanical ventilation, trach in position   HENT:      Head: Normocephalic and atraumatic.      Right Ear: External ear normal.      Left Ear: External ear  normal.      Nose: Nose normal.      Mouth/Throat:      Lips: Pink.      Mouth: Mucous membranes are dry.   Eyes:      General: Lids are normal.      Conjunctiva/sclera: Conjunctivae normal.   Neck:      Trachea: Tracheostomy present.      Comments: TRACHEOSTOMY SITE:    Tracheostomy tube type: Shiley #8 cuffed DIC    Stoma: Healing appropriately    Secretions: Creamy, mild to moderate    Voice: Not assessed  Date placed: 1/25/2022  Date last changed: Never  Pulmonary:      Effort: No tachypnea, respiratory distress or retractions. He is intubated.      Comments: Mechanical ventilation, trach in position  Lymphadenopathy:      Cervical: No cervical adenopathy.   Neurological:      Mental Status: He is unresponsive.   Psychiatric:      Comments: Not evaluated           Result Review    Result Review:  I have personally reviewed the results from the time of this admission to 2/4/2022 12:53 CST and agree with these findings:  []  Laboratory  []  Microbiology  []  Radiology  []  EKG/Telemetry   []  Cardiology/Vascular   []  Pathology  []  Old records  []  Other:  Most notable findings include: None pertinent ENT    Assessment/Plan   Assessment / Plan   Brief Patient Summary:  Eddy Ferro is a 51 y.o. male who has remained on mechanical ventilation, trach in position. He is unable to wean from the vent. He is on fairly low ventilator settings. He continues to require sedation. His trach remained stable. I will continue trach care.    Active Hospital Problems:   Active Hospital Problems    Diagnosis    • **Pneumonia due to COVID-19 virus    • Severe malnutrition (CMS/HCC)    • Hx of tracheostomy    • Ventilator dependent (HCC)    • Acute hypoxemic respiratory failure (HCC)    • Acute respiratory distress syndrome (ARDS) due to 2019 novel coronavirus ([facility])    • Obesity (BMI 30-39.9)    • Acute subsegmental pulmonary embolism without acute cor pulmonale (HCC)      Plan:   Trach management-I will continue current trach  care. I will modify trach depending on his ventilator situation.  Respiratory failure-the patient remains on mechanical ventilation. He is followed by the pulmonary service.  Discussed Plan with RT  Following patient as in-patient. Further recommendations will be made based on serial examinations.    Medications/Orders for this encounter: No new medications ordered.  No New orders written.    Discharge Planning: Per primary team        DVT prophylaxis:  Medical and mechanical DVT prophylaxis orders are present.     Electronically signed by Liam Bergman Jr, MD, 02/04/22, 12:53 PM CST.

## 2022-02-05 LAB
ALBUMIN SERPL-MCNC: 2.5 G/DL (ref 3.5–5.2)
ALBUMIN/GLOB SERPL: 0.5 G/DL
ALP SERPL-CCNC: 184 U/L (ref 39–117)
ALT SERPL W P-5'-P-CCNC: 59 U/L (ref 1–41)
ANION GAP SERPL CALCULATED.3IONS-SCNC: 8 MMOL/L (ref 5–15)
ARTERIAL PATENCY WRIST A: POSITIVE
AST SERPL-CCNC: 61 U/L (ref 1–40)
ATMOSPHERIC PRESS: 763 MMHG
BASE EXCESS BLDA CALC-SCNC: 2 MMOL/L (ref 0–2)
BASOPHILS # BLD AUTO: 0.09 10*3/MM3 (ref 0–0.2)
BASOPHILS NFR BLD AUTO: 1.2 % (ref 0–1.5)
BDY SITE: ABNORMAL
BILIRUB SERPL-MCNC: 1 MG/DL (ref 0–1.2)
BODY TEMPERATURE: 37 C
BUN SERPL-MCNC: 16 MG/DL (ref 6–20)
BUN/CREAT SERPL: 47.1 (ref 7–25)
CALCIUM SPEC-SCNC: 8.3 MG/DL (ref 8.6–10.5)
CHLORIDE SERPL-SCNC: 105 MMOL/L (ref 98–107)
CO2 SERPL-SCNC: 25 MMOL/L (ref 22–29)
CREAT SERPL-MCNC: 0.34 MG/DL (ref 0.76–1.27)
DEPRECATED RDW RBC AUTO: 57.3 FL (ref 37–54)
EOSINOPHIL # BLD AUTO: 1.75 10*3/MM3 (ref 0–0.4)
EOSINOPHIL NFR BLD AUTO: 22.9 % (ref 0.3–6.2)
ERYTHROCYTE [DISTWIDTH] IN BLOOD BY AUTOMATED COUNT: 19.1 % (ref 12.3–15.4)
GFR SERPL CREATININE-BSD FRML MDRD: >150 ML/MIN/1.73
GLOBULIN UR ELPH-MCNC: 4.7 GM/DL
GLUCOSE BLDC GLUCOMTR-MCNC: 91 MG/DL (ref 70–130)
GLUCOSE BLDC GLUCOMTR-MCNC: 92 MG/DL (ref 70–130)
GLUCOSE BLDC GLUCOMTR-MCNC: 98 MG/DL (ref 70–130)
GLUCOSE SERPL-MCNC: 108 MG/DL (ref 65–99)
HCO3 BLDA-SCNC: 27.1 MMOL/L (ref 20–26)
HCT VFR BLD AUTO: 33.8 % (ref 37.5–51)
HGB BLD-MCNC: 10.6 G/DL (ref 13–17.7)
IMM GRANULOCYTES # BLD AUTO: 0.18 10*3/MM3 (ref 0–0.05)
IMM GRANULOCYTES NFR BLD AUTO: 2.4 % (ref 0–0.5)
INHALED O2 CONCENTRATION: 30 %
LYMPHOCYTES # BLD AUTO: 1.66 10*3/MM3 (ref 0.7–3.1)
LYMPHOCYTES NFR BLD AUTO: 21.8 % (ref 19.6–45.3)
Lab: ABNORMAL
MCH RBC QN AUTO: 26.2 PG (ref 26.6–33)
MCHC RBC AUTO-ENTMCNC: 31.4 G/DL (ref 31.5–35.7)
MCV RBC AUTO: 83.7 FL (ref 79–97)
MODALITY: ABNORMAL
MONOCYTES # BLD AUTO: 0.55 10*3/MM3 (ref 0.1–0.9)
MONOCYTES NFR BLD AUTO: 7.2 % (ref 5–12)
NEUTROPHILS NFR BLD AUTO: 3.4 10*3/MM3 (ref 1.7–7)
NEUTROPHILS NFR BLD AUTO: 44.5 % (ref 42.7–76)
NRBC BLD AUTO-RTO: 0 /100 WBC (ref 0–0.2)
PAW @ PEAK INSP FLOW SETTING VENT: 15 CMH2O
PCO2 BLDA: 43.3 MM HG (ref 35–45)
PCO2 TEMP ADJ BLD: 43.3 MM HG (ref 35–45)
PEEP RESPIRATORY: 5 CM[H2O]
PH BLDA: 7.41 PH UNITS (ref 7.35–7.45)
PH, TEMP CORRECTED: 7.41 PH UNITS (ref 7.35–7.45)
PLATELET # BLD AUTO: 214 10*3/MM3 (ref 140–450)
PMV BLD AUTO: 12.3 FL (ref 6–12)
PO2 BLDA: 101 MM HG (ref 83–108)
PO2 TEMP ADJ BLD: 101 MM HG (ref 83–108)
POTASSIUM SERPL-SCNC: 3.7 MMOL/L (ref 3.5–5.2)
PROT SERPL-MCNC: 7.2 G/DL (ref 6–8.5)
QT INTERVAL: 376 MS
QTC INTERVAL: 459 MS
RBC # BLD AUTO: 4.04 10*6/MM3 (ref 4.14–5.8)
SAO2 % BLDCOA: 98.1 % (ref 94–99)
SET MECH RESP RATE: 14
SODIUM SERPL-SCNC: 138 MMOL/L (ref 136–145)
VENTILATOR MODE: ABNORMAL
WBC NRBC COR # BLD: 7.63 10*3/MM3 (ref 3.4–10.8)

## 2022-02-05 PROCEDURE — 99233 SBSQ HOSP IP/OBS HIGH 50: CPT | Performed by: INTERNAL MEDICINE

## 2022-02-05 PROCEDURE — 85025 COMPLETE CBC W/AUTO DIFF WBC: CPT | Performed by: INTERNAL MEDICINE

## 2022-02-05 PROCEDURE — 25010000002 LORAZEPAM PER 2 MG: Performed by: NURSE PRACTITIONER

## 2022-02-05 PROCEDURE — 25010000002 MAGNESIUM SULFATE PER 500 MG OF MAGNESIUM: Performed by: INTERNAL MEDICINE

## 2022-02-05 PROCEDURE — 82962 GLUCOSE BLOOD TEST: CPT

## 2022-02-05 PROCEDURE — 25010000002 POTASSIUM CHLORIDE PER 2 MEQ OF POTASSIUM: Performed by: INTERNAL MEDICINE

## 2022-02-05 PROCEDURE — 36600 WITHDRAWAL OF ARTERIAL BLOOD: CPT

## 2022-02-05 PROCEDURE — 94799 UNLISTED PULMONARY SVC/PX: CPT

## 2022-02-05 PROCEDURE — 25010000002 PROPOFOL 10 MG/ML EMULSION: Performed by: INTERNAL MEDICINE

## 2022-02-05 PROCEDURE — 93010 ELECTROCARDIOGRAM REPORT: CPT | Performed by: INTERNAL MEDICINE

## 2022-02-05 PROCEDURE — 80053 COMPREHEN METABOLIC PANEL: CPT | Performed by: INTERNAL MEDICINE

## 2022-02-05 PROCEDURE — 25010000002 CALCIUM GLUCONATE PER 10 ML: Performed by: INTERNAL MEDICINE

## 2022-02-05 PROCEDURE — 82803 BLOOD GASES ANY COMBINATION: CPT

## 2022-02-05 PROCEDURE — 25010000002 METOCLOPRAMIDE PER 10 MG: Performed by: INTERNAL MEDICINE

## 2022-02-05 PROCEDURE — 94003 VENT MGMT INPAT SUBQ DAY: CPT

## 2022-02-05 PROCEDURE — 25010000002 ENOXAPARIN PER 10 MG: Performed by: INTERNAL MEDICINE

## 2022-02-05 PROCEDURE — 25010000002 MORPHINE SULFATE (PF) 2 MG/ML SOLUTION: Performed by: INTERNAL MEDICINE

## 2022-02-05 PROCEDURE — 93005 ELECTROCARDIOGRAM TRACING: CPT | Performed by: INTERNAL MEDICINE

## 2022-02-05 RX ORDER — OLANZAPINE 5 MG/1
5 TABLET, ORALLY DISINTEGRATING ORAL 2 TIMES DAILY
Status: DISCONTINUED | OUTPATIENT
Start: 2022-02-05 | End: 2022-02-09 | Stop reason: HOSPADM

## 2022-02-05 RX ADMIN — LORAZEPAM 2 MG: 2 INJECTION INTRAMUSCULAR; INTRAVENOUS at 08:10

## 2022-02-05 RX ADMIN — OXYCODONE HYDROCHLORIDE 5 MG: 5 SOLUTION ORAL at 23:50

## 2022-02-05 RX ADMIN — PROPOFOL 65 MCG/KG/MIN: 10 INJECTION, EMULSION INTRAVENOUS at 07:57

## 2022-02-05 RX ADMIN — OXYCODONE HYDROCHLORIDE 5 MG: 5 SOLUTION ORAL at 00:11

## 2022-02-05 RX ADMIN — PANTOPRAZOLE SODIUM 40 MG: 40 INJECTION, POWDER, FOR SOLUTION INTRAVENOUS at 08:00

## 2022-02-05 RX ADMIN — PROPOFOL 65 MCG/KG/MIN: 10 INJECTION, EMULSION INTRAVENOUS at 11:17

## 2022-02-05 RX ADMIN — BUDESONIDE AND FORMOTEROL FUMARATE DIHYDRATE 2 PUFF: 160; 4.5 AEROSOL RESPIRATORY (INHALATION) at 07:00

## 2022-02-05 RX ADMIN — OXYCODONE HYDROCHLORIDE 5 MG: 5 SOLUTION ORAL at 17:05

## 2022-02-05 RX ADMIN — GUAIFENESIN 400 MG: 100 SOLUTION ORAL at 14:05

## 2022-02-05 RX ADMIN — PROPOFOL 70 MCG/KG/MIN: 10 INJECTION, EMULSION INTRAVENOUS at 05:36

## 2022-02-05 RX ADMIN — PROPOFOL 65 MCG/KG/MIN: 10 INJECTION, EMULSION INTRAVENOUS at 23:15

## 2022-02-05 RX ADMIN — DIPHENHYDRAMINE HYDROCHLORIDE 12.5 MG: 12.5 SOLUTION ORAL at 05:36

## 2022-02-05 RX ADMIN — DIPHENHYDRAMINE HYDROCHLORIDE 12.5 MG: 12.5 SOLUTION ORAL at 11:17

## 2022-02-05 RX ADMIN — MORPHINE SULFATE 2 MG: 2 INJECTION, SOLUTION INTRAMUSCULAR; INTRAVENOUS at 04:56

## 2022-02-05 RX ADMIN — METOCLOPRAMIDE HYDROCHLORIDE 5 MG: 5 INJECTION INTRAMUSCULAR; INTRAVENOUS at 15:08

## 2022-02-05 RX ADMIN — OLANZAPINE 5 MG: 5 TABLET, ORALLY DISINTEGRATING ORAL at 20:41

## 2022-02-05 RX ADMIN — METOPROLOL TARTRATE 5 MG: 5 INJECTION, SOLUTION INTRAVENOUS at 08:00

## 2022-02-05 RX ADMIN — SODIUM CHLORIDE, PRESERVATIVE FREE 10 ML: 5 INJECTION INTRAVENOUS at 08:01

## 2022-02-05 RX ADMIN — OLANZAPINE 5 MG: 5 TABLET, ORALLY DISINTEGRATING ORAL at 11:17

## 2022-02-05 RX ADMIN — POTASSIUM PHOSPHATE, MONOBASIC POTASSIUM PHOSPHATE, DIBASIC: 224; 236 INJECTION, SOLUTION, CONCENTRATE INTRAVENOUS at 17:05

## 2022-02-05 RX ADMIN — ENOXAPARIN SODIUM 100 MG: 100 INJECTION SUBCUTANEOUS at 11:17

## 2022-02-05 RX ADMIN — PROPOFOL 65 MCG/KG/MIN: 10 INJECTION, EMULSION INTRAVENOUS at 18:41

## 2022-02-05 RX ADMIN — DEXMEDETOMIDINE HYDROCHLORIDE 0.4 MCG/KG/HR: 4 INJECTION, SOLUTION INTRAVENOUS at 06:45

## 2022-02-05 RX ADMIN — PROPOFOL 65 MCG/KG/MIN: 10 INJECTION, EMULSION INTRAVENOUS at 15:58

## 2022-02-05 RX ADMIN — PROPOFOL 65 MCG/KG/MIN: 10 INJECTION, EMULSION INTRAVENOUS at 21:38

## 2022-02-05 RX ADMIN — ALBUTEROL SULFATE 2.5 MG: 2.5 SOLUTION RESPIRATORY (INHALATION) at 18:27

## 2022-02-05 RX ADMIN — METOCLOPRAMIDE HYDROCHLORIDE 5 MG: 5 INJECTION INTRAMUSCULAR; INTRAVENOUS at 08:00

## 2022-02-05 RX ADMIN — ALBUTEROL SULFATE 2.5 MG: 2.5 SOLUTION RESPIRATORY (INHALATION) at 12:04

## 2022-02-05 RX ADMIN — GUAIFENESIN 400 MG: 100 SOLUTION ORAL at 21:08

## 2022-02-05 RX ADMIN — ENOXAPARIN SODIUM 100 MG: 100 INJECTION SUBCUTANEOUS at 00:11

## 2022-02-05 RX ADMIN — OXYCODONE HYDROCHLORIDE 5 MG: 5 SOLUTION ORAL at 05:36

## 2022-02-05 RX ADMIN — DOCUSATE SODIUM 50 MG AND SENNOSIDES 8.6 MG 1 TABLET: 8.6; 5 TABLET, FILM COATED ORAL at 20:42

## 2022-02-05 RX ADMIN — POLYETHYLENE GLYCOL 3350 17 G: 17 POWDER, FOR SOLUTION ORAL at 08:00

## 2022-02-05 RX ADMIN — DEXMEDETOMIDINE HYDROCHLORIDE 0.3 MCG/KG/HR: 4 INJECTION, SOLUTION INTRAVENOUS at 20:40

## 2022-02-05 RX ADMIN — AMIODARONE HYDROCHLORIDE 200 MG: 200 TABLET ORAL at 09:42

## 2022-02-05 RX ADMIN — PROPOFOL 65 MCG/KG/MIN: 10 INJECTION, EMULSION INTRAVENOUS at 13:25

## 2022-02-05 RX ADMIN — METOPROLOL TARTRATE 5 MG: 5 INJECTION, SOLUTION INTRAVENOUS at 15:08

## 2022-02-05 RX ADMIN — LORAZEPAM 2 MG: 2 INJECTION INTRAMUSCULAR; INTRAVENOUS at 02:06

## 2022-02-05 RX ADMIN — BUDESONIDE AND FORMOTEROL FUMARATE DIHYDRATE 2 PUFF: 160; 4.5 AEROSOL RESPIRATORY (INHALATION) at 18:27

## 2022-02-05 RX ADMIN — DIPHENHYDRAMINE HYDROCHLORIDE 12.5 MG: 12.5 SOLUTION ORAL at 23:50

## 2022-02-05 RX ADMIN — PROPOFOL 70 MCG/KG/MIN: 10 INJECTION, EMULSION INTRAVENOUS at 00:12

## 2022-02-05 RX ADMIN — ALBUTEROL SULFATE 2.5 MG: 2.5 SOLUTION RESPIRATORY (INHALATION) at 23:02

## 2022-02-05 RX ADMIN — AMIODARONE HYDROCHLORIDE 200 MG: 200 TABLET ORAL at 17:05

## 2022-02-05 RX ADMIN — PROPOFOL 70 MCG/KG/MIN: 10 INJECTION, EMULSION INTRAVENOUS at 02:39

## 2022-02-05 RX ADMIN — METOPROLOL TARTRATE 5 MG: 5 INJECTION, SOLUTION INTRAVENOUS at 02:33

## 2022-02-05 RX ADMIN — METOCLOPRAMIDE HYDROCHLORIDE 5 MG: 5 INJECTION INTRAMUSCULAR; INTRAVENOUS at 20:42

## 2022-02-05 RX ADMIN — TERAZOSIN HYDROCHLORIDE 1 MG: 1 CAPSULE ORAL at 20:41

## 2022-02-05 RX ADMIN — METOPROLOL TARTRATE 5 MG: 5 INJECTION, SOLUTION INTRAVENOUS at 20:44

## 2022-02-05 RX ADMIN — DIPHENHYDRAMINE HYDROCHLORIDE 12.5 MG: 12.5 SOLUTION ORAL at 17:05

## 2022-02-05 RX ADMIN — AMIODARONE HYDROCHLORIDE 200 MG: 200 TABLET ORAL at 01:22

## 2022-02-05 RX ADMIN — ATORVASTATIN CALCIUM 20 MG: 10 TABLET, FILM COATED ORAL at 20:42

## 2022-02-05 RX ADMIN — GUAIFENESIN 400 MG: 100 SOLUTION ORAL at 05:36

## 2022-02-05 RX ADMIN — PANTOPRAZOLE SODIUM 40 MG: 40 INJECTION, POWDER, FOR SOLUTION INTRAVENOUS at 20:41

## 2022-02-05 RX ADMIN — OXYCODONE HYDROCHLORIDE 5 MG: 5 SOLUTION ORAL at 11:17

## 2022-02-05 RX ADMIN — DIPHENHYDRAMINE HYDROCHLORIDE 12.5 MG: 12.5 SOLUTION ORAL at 00:11

## 2022-02-05 RX ADMIN — ALBUTEROL SULFATE 2.5 MG: 2.5 SOLUTION RESPIRATORY (INHALATION) at 07:00

## 2022-02-05 RX ADMIN — DOCUSATE SODIUM 50 MG AND SENNOSIDES 8.6 MG 1 TABLET: 8.6; 5 TABLET, FILM COATED ORAL at 08:00

## 2022-02-05 RX ADMIN — ENOXAPARIN SODIUM 90 MG: 100 INJECTION SUBCUTANEOUS at 23:50

## 2022-02-05 NOTE — SIGNIFICANT NOTE
02/05/22 0759   Readings   Plateau Pressure (cm H2O) 20 cm H2O   Static Compliance (L/cm H2O) 26   Dynamic Compliance (L/cm H2O) 30 L/cm H2O

## 2022-02-05 NOTE — PROGRESS NOTES
PULMONARY AND CRITICAL CARE PROGRESS NOTE - [facility]    Patient: Eddy Ferro  1970   MR# [id number]   Acct# [id number]  02/05/22   11:25 CST  Referring Provider: Bhupinder Moss*    Chief Complaint: Acute respiratory failure.  Mechanical ventilation.  COVID-19 pneumonia, tracheostomy and PEG placement done.    Interval history: Patient was seen in the follow-up visit in pulmonary rounds in intensive care unit today.  He is on prolonged mechanical ventilatory support and needed tracheostomy and PEG tube placement and is currently on full mechanical ventilation.  His current ventilator settings are pressure control ventilation with PI of 15, PEEP of 5, rate of 14, 30% FiO2 98% oxygen saturation.  Patient is out of Covid isolation. He remains mechanically ventilated and sedated on propofol and Precedex.  Oxygen saturation 98% on PEEP of 5 and FiO2 0.3 ET CO2 36.  Plateau pressure 11.  Tidal volumes 550.  ABG reviewed and stable.  He continues on TPN and is unable to tolerate any tube feedings due to increased residual.  He is already getting Reglan.  Afebrile.  Sedated weaning attempts complicated by increased agitation and anxiety.  No new events overnight.      Meds:  albuterol, 2.5 mg, Nebulization, Q6H - RT  amiodarone, 200 mg, Oral, Q8H   Followed by  [START ON 2/11/2022] amiodarone, 200 mg, Oral, Q12H   Followed by  [START ON 2/25/2022] amiodarone, 200 mg, Oral, Daily  atorvastatin, 20 mg, Per PEG Tube, Nightly  budesonide-formoterol, 2 puff, Inhalation, BID - RT  diphenhydrAMINE, 12.5 mg, Oral, Q6H  enoxaparin, 1 mg/kg, Subcutaneous, Q12H  guaiFENesin, 400 mg, Per PEG Tube, Q8H  metoclopramide, 5 mg, Intravenous, TID  metoprolol tartrate, 5 mg, Intravenous, Q6H  OLANZapine zydis, 5 mg, Oral, BID  oxyCODONE, 5 mg, Per PEG Tube, Q6H  pantoprazole, 40 mg, Intravenous, Q12H  polyethylene glycol, 17 g, Per PEG Tube, Daily  senna-docusate sodium, 1 tablet, Per PEG Tube,  BID  terazosin, 1 mg, Per PEG Tube, Nightly      Adult Custom Central TPN,   Adult Custom Central TPN, , Last Rate: 80 mL/hr at 02/04/22 1706  dexmedetomidine, 0.2-1.5 mcg/kg/hr, Last Rate: 0.3 mcg/kg/hr (02/05/22 0737)  propofol, 5-75 mcg/kg/min, Last Rate: 65 mcg/kg/min (02/05/22 1117)      Review of Systems:   Review of Systems   Unable to perform ROS: Intubated     Physical Exam:  SpO2 Percentage    02/05/22 0900 02/05/22 1000 02/05/22 1100   SpO2: 98% 97% 98%     Body mass index is 29.64 kg/m².   Temp:  [96.9 °F (36.1 °C)-98.8 °F (37.1 °C)] 97.6 °F (36.4 °C)  Heart Rate:  [] 82  Resp:  [24-32] 31  BP: ()/(54-90) 94/59  FiO2 (%):  [30 %] 30 %    Intake/Output Summary (Last 24 hours) at 2/5/2022 1125  Last data filed at 2/5/2022 1116  Gross per 24 hour   Intake 3323 ml   Output 3025 ml   Net 298 ml     Physical Exam  Constitutional:       General: He is not in acute distress.     Appearance: He is ill-appearing. He is not diaphoretic.      Interventions: He is sedated.   HENT:      Head: Normocephalic.      Nose: Nose normal.      Mouth/Throat:      Mouth: Mucous membranes are moist.   Eyes:      General: No scleral icterus.  Neck:      Comments: Vent to trach  Cardiovascular:      Rate and Rhythm: Tachycardia present. Rhythm irregular.      Comments: Rate 101  Pulmonary:      Effort: Pulmonary effort is normal. No respiratory distress.      Breath sounds: Rhonchi present. No wheezing.   Abdominal:      General: There is no distension.      Comments: PEG   Musculoskeletal:      Right lower leg: No edema.      Left lower leg: No edema.   Skin:     Coloration: Skin is not pale.   Neurological:      Comments: Sedated         Laboratory Data:  Results from last 7 days   Lab Units 02/05/22  0300 02/03/22  0239 02/02/22  0311   WBC 10*3/mm3 7.63 8.92 8.28   HEMOGLOBIN g/dL 10.6* 10.3* 10.0*   PLATELETS 10*3/mm3 214 217 196     Results from last 7 days   Lab Units 02/05/22  0300 02/03/22  0239 02/02/22  0311    SODIUM mmol/L 138 137 136   POTASSIUM mmol/L 3.7 4.5 3.9   BUN mg/dL 16 21* 23*   CREATININE mg/dL 0.34* 0.42* 0.42*     Results from last 7 days   Lab Units 02/05/22  0329 02/04/22  0305 02/03/22  0310   PH, ARTERIAL pH units 7.405 7.407 7.417   PCO2, ARTERIAL mm Hg 43.3 42.0 43.2   PO2 ART mm Hg 101.0 104.0 97.4   FIO2 % 30 30 30     No results found for: BLOODCX, URINECX, WOUNDCX, MRSACX, RESPCX, STOOLCX  Recent films:  No radiology results for the last day  Films reviewed personally by me.       My radiograph interpretation/independent review of imaging: No new imaging studies today.       Pulmonary Assessment:     1. Acute respiratory failure due to COVID-19   2. On mechanical ventilation failed weaning trials  3. S/p tracheostomy placement done today.  4. SARS-COV2 viral pneumonia  5. Status post completion of remdesivir and baricitinib  6. Secondary bacterial infection with prior history of baricitinib therapy  7. Pulmonary embolism stable  8. Ventilator dyssynchrony improved  9. S/p PEG tube placement today.  10. Atrial fibrillation with rapid ventricle response     Recommend/plan:   · Reviewed current ventilator settings and we'll continue the current ventilator settings.  · Atrial fibrillation is better controlled.  He was on amiodarone and cardiology adjusted his drips.  Continue on p.o. amiodarone.  · He is on PEEP of 5 and FiO2 30% oxygen saturation 97% and resting comfortably without ventilator dyssynchrony.    · He had tracheostomy placed and ENT following.  · No plan for any spontaneous breathing trial due to cardiac arrhythmias we will try it later when he is more stable.  · Continue tube feedings with PEG tube when tolerated..  · Continue Symbicort.  Change albuterol to Atrovent due to cardiac arrhythmia  · Pulmonary toilet and Mucinex for mucolytic treatment.  · Continue long-term anticoagulation for pulmonary embolism.  · Antibiotics completed.  He has increased anxiety and started on  sublingual Zyprexa.  · Stress ulcer prophylaxis with Protonix.  · Continue TPN for nutritional support patient has increased residuals and is unable to get any tube feeding now.  · Patient completed dexamethasone.Continue adjunct treatment for COVID-19.  · Continue pain anxiety control and sedation.   · He is on propofol and Precedex.  Wean as tolerated.  · TPN for nutritional support now.  Repeat labs and imaging studies as needed.  · CODE STATUS: Full  · Overall prognosis: Guarded.  · He is awaiting for LTAC placement.  He is actually from [place] and is waiting for placement in a skilled nursing facility with ventilator if he is unable to wean.  · We will follow.     Electronically signed by     Florence Ricks MD  Pulmonologist/Intensivist  2/5/2022 11:25 CST

## 2022-02-05 NOTE — PROGRESS NOTES
LOS: 25 days   Patient Care Team:  Provider, No Known as PCP - General    Chief Complaint: Shortness of breath and respiratory failure with paroxysmal atrial fibrillation     Moshe Ferro is a 51 y.o. male who is being seen in follow-up.    Overnight no new issues or events  Hemodynamically stable  Maintaining sinus rhythm  Labs reviewed  Liver function test stable  Tolerating amiodarone well  No malignant arrhythmia  No family member by bedside  Other attending notes reviewed  On anticoagulation with Lovenox  No significant bleeding issues  H&H is stable    Telemetry: no malignant arrhythmia. No significant pauses.  In sinus rhythm now    Review of Systems       Unable to perform as intubated  Cannot obtain due to mechanical ventilation.  The patient notably is critically ill and connected to a ventilator.  As such patient cannot communicate and provide any history whatsoever, including any history of present illness or interval history since arrival or review of systems. The interested reviewer may note this fact, as an attempt has been made at collecting and documenting these portions of the patient history, but this information is unobtainable despite attempted review and therefore cannot be documented at this time.         History:   Past Medical History:   Diagnosis Date   • Murray's esophagus    • GERD (gastroesophageal reflux disease)    • Hypertension      Past Surgical History:   Procedure Laterality Date   • KNEE SURGERY     • LARYNGOSCOPY N/A 1/25/2022    Procedure: laryngoscopy;  Surgeon: Liam Bergman Jr., MD;  Location: [facility] OR;  Service: ENT;  Laterality: N/A;   • PEG TUBE INSERTION N/A 1/27/2022    Procedure: PERCUTANEOUS ENDOSCOPIC GASTROSTOMY TUBE INSERTION at bedside;  Surgeon: Ino Wilkinson MD;  Location: [facility] ENDOSCOPY;  Service: Gastroenterology;  Laterality: N/A;  preop; peg placement   postop; peg placement   PCP none   • SPINE SURGERY     • TRACHEOSTOMY  N/A 1/25/2022    Procedure: tracheostomy;  Surgeon: Liam Bergman Jr., MD;  Location: [facility];  Service: ENT;  Laterality: N/A;     Social History     Socioeconomic History   • Marital status: Single   Tobacco Use   • Smoking status: Former Smoker   Substance and Sexual Activity   • Alcohol use: Yes     Comment: rare     Family History   Problem Relation Age of Onset   • Stroke Mother    • Cancer Father        Labs:  WBC WBC   Date Value Ref Range Status   02/05/2022 7.63 3.40 - 10.80 10*3/mm3 Final   02/03/2022 8.92 3.40 - 10.80 10*3/mm3 Final      HGB Hemoglobin   Date Value Ref Range Status   02/05/2022 10.6 (L) 13.0 - 17.7 g/dL Final   02/03/2022 10.3 (L) 13.0 - 17.7 g/dL Final      HCT Hematocrit   Date Value Ref Range Status   02/05/2022 33.8 (L) 37.5 - 51.0 % Final   02/03/2022 34.0 (L) 37.5 - 51.0 % Final      Platelets Platelets   Date Value Ref Range Status   02/05/2022 214 140 - 450 10*3/mm3 Final   02/03/2022 217 140 - 450 10*3/mm3 Final      MCV MCV   Date Value Ref Range Status   02/05/2022 83.7 79.0 - 97.0 fL Final   02/03/2022 83.5 79.0 - 97.0 fL Final        Results from last 7 days   Lab Units 02/05/22  0300 02/03/22  0239 02/02/22  0311   SODIUM mmol/L 138 137 136   POTASSIUM mmol/L 3.7 4.5 3.9   CHLORIDE mmol/L 105 104 99   CO2 mmol/L 25.0 27.0 29.0   BUN mg/dL 16 21* 23*   CREATININE mg/dL 0.34* 0.42* 0.42*   CALCIUM mg/dL 8.3* 8.1* 8.1*   BILIRUBIN mg/dL 1.0 1.3* 1.2   ALK PHOS U/L 184* 172* 148*   ALT (SGPT) U/L 59* 53* 39   AST (SGOT) U/L 61* 69* 45*   GLUCOSE mg/dL 108* 100* 114*     Lab Results   Component Value Date    CKTOTAL 74 01/23/2022    TROPONINT 0.018 01/11/2022     PT/INR:  No results found for: PROTIME/No results found for: INR    Imaging Results (Last 72 Hours)     Procedure Component Value Units Date/Time    XR Abdomen KUB [[id number]] Collected: 02/02/22 2102     Updated: 02/02/22 2107    Narrative:      EXAMINATION:  XR ABDOMEN KUB-  2/2/2022 9:01 PM CST     HISTORY:  Gastric retention. E43-Unspecified severe protein-calorie  malnutrition.     COMPARISON: 1/29/2022.     TECHNIQUE: Supine abdomen      FINDINGS:   The stomach is not distended. There is air predominantly in  the right colon. No small bowel distention is seen. There is a tube  overlying the stomach that may be a gastric tube. There is a Moss  catheter overlying the lower pelvis.       Impression:      No acute findings.        This report was finalized on 02/02/2022 21:04 by Dr. Andreas Dominguez MD.          Objective     No Known Allergies    Medication Review: Performed  Current Facility-Administered Medications   Medication Dose Route Frequency Provider Last Rate Last Admin   • acetaminophen (TYLENOL) 160 MG/5ML solution 650 mg  650 mg Per PEG Tube Q6H PRN Lane Cowan DO   650 mg at 01/31/22 2254   • acetaminophen (TYLENOL) suppository 650 mg  650 mg Rectal Q6H PRN Sumeet Laughlin DO   650 mg at 01/17/22 1434   • Adult Custom Central TPN   Intravenous Once per day on Sun Tue Thu Sat Bhupinder Moss MD       • Adult Custom Central TPN   Intravenous Once per day on Mon Wed Fri Bhupinder Moss MD 80 mL/hr at 02/04/22 1706 New Bag at 02/04/22 1706   • albuterol (PROVENTIL) nebulizer solution 0.083% 2.5 mg/3mL  2.5 mg Nebulization Q6H - RT Mario Hong MD   2.5 mg at 02/05/22 0700   • amiodarone (PACERONE) tablet 200 mg  200 mg Oral Q8H Sathya Barillas MD   200 mg at 02/05/22 0942    Followed by   • [START ON 2/11/2022] amiodarone (PACERONE) tablet 200 mg  200 mg Oral Q12H Sathya Barillas MD        Followed by   • [START ON 2/25/2022] amiodarone (PACERONE) tablet 200 mg  200 mg Oral Daily Sathya Barillas MD       • atorvastatin (LIPITOR) tablet 20 mg  20 mg Per PEG Tube Nightly Lane Cowan DO   20 mg at 02/04/22 2023   • bisacodyl (DULCOLAX) suppository 10 mg  10 mg Rectal Daily PRN Sumeet Laughlin DO   10 mg at 01/29/22 1149   • budesonide-formoterol (SYMBICORT) 160-4.5 MCG/ACT  inhaler 2 puff  2 puff Inhalation BID - RT Sumeet Laughlin DO   2 puff at 02/05/22 0700   • dexmedetomidine (PRECEDEX) 400 mcg in 100 mL NS infusion  0.2-1.5 mcg/kg/hr Intravenous Titrated Bhupinder Moss MD 7.1 mL/hr at 02/05/22 0737 0.3 mcg/kg/hr at 02/05/22 0737   • dextromethorphan polistirex ER (DELSYM) 30 MG/5ML oral suspension 60 mg  60 mg Per PEG Tube Q12H PRN Lane oCwan DO   60 mg at 01/31/22 2253   • diphenhydrAMINE (BENADRYL) 12.5 MG/5ML elixir 12.5 mg  12.5 mg Oral Q6H Bhupinder Moss MD   12.5 mg at 02/05/22 1117   • enoxaparin (LOVENOX) syringe 100 mg  1 mg/kg Subcutaneous Q12H Lane Cowan DO   100 mg at 02/05/22 1117   • guaiFENesin (ROBITUSSIN) 100 MG/5ML oral solution 400 mg  400 mg Per PEG Tube Q8H Lane Cowan DO   400 mg at 02/05/22 0536   • hydrocortisone-bacitracin-zinc oxide-nystatin (MAGIC BARRIER) ointment 1 application  1 application Topical PRN Sumeet Laughlin DO       • LORazepam (ATIVAN) injection 2 mg  2 mg Intravenous Q4H PRN Kristin Barry APRN   2 mg at 02/05/22 0810   • metoclopramide (REGLAN) injection 5 mg  5 mg Intravenous TID Bhupinder Moss MD   5 mg at 02/05/22 0800   • metoprolol tartrate (LOPRESSOR) injection 5 mg  5 mg Intravenous Q6H Bhupinder Moss MD   5 mg at 02/05/22 0800   • Morphine sulfate (PF) injection 2 mg  2 mg Intravenous Q6H PRN Bhupinder Moss MD   2 mg at 02/05/22 0456   • OLANZapine zydis (zyPREXA) disintegrating tablet 5 mg  5 mg Oral BID Sensarma Sugchela, MD   5 mg at 02/05/22 1117   • ondansetron (ZOFRAN) tablet 4 mg  4 mg Oral Q6H PRN Sumeet Laughlin DO        Or   • ondansetron (ZOFRAN) injection 4 mg  4 mg Intravenous Q6H PRN Sumeet Laughlin DO       • oxyCODONE (ROXICODONE) 5 MG/5ML solution 5 mg  5 mg Per PEG Tube Q6H Lane Cowan DO   5 mg at 02/05/22 1117   • pantoprazole (PROTONIX) injection 40 mg  40 mg Intravenous Q12H Sumeet Laughlin DO   40 mg at  "02/05/22 0800   • polyethylene glycol (MIRALAX) packet 17 g  17 g Per PEG Tube Daily Lane Cowan DO   17 g at 02/05/22 0800   • propofol (DIPRIVAN) infusion 10 mg/mL 100 mL  5-75 mcg/kg/min Intravenous Titrated Sumeet Laughlin,  35.3 mL/hr at 02/05/22 1117 65 mcg/kg/min at 02/05/22 1117   • sennosides-docusate (PERICOLACE) 8.6-50 MG per tablet 1 tablet  1 tablet Per PEG Tube BID Lane Cowan DO   1 tablet at 02/05/22 0800   • sodium chloride 0.9 % flush 10 mL  10 mL Intravenous PRN Sumeet Laughlin DO   10 mL at 02/05/22 0801   • terazosin (HYTRIN) capsule 1 mg  1 mg Per PEG Tube Nightly Lane Cowan DO   1 mg at 02/04/22 2024       Vital Sign Min/Max for last 24 hours  Temp  Min: 96.9 °F (36.1 °C)  Max: 98.8 °F (37.1 °C)   BP  Min: 90/54  Max: 137/82   Pulse  Min: 77  Max: 122   Resp  Min: 24  Max: 32   SpO2  Min: 96 %  Max: 100 %   No data recorded   Weight  Min: 93.2 kg (205 lb 7.5 oz)  Max: 93.7 kg (206 lb 9.1 oz)     Flowsheet Rows      First Filed Value   Admission Height 177.8 cm (70\") Documented at 01/11/2022 1815   Admission Weight 108 kg (237 lb 14 oz) Documented at 01/11/2022 1815          Results for orders placed during the hospital encounter of 01/11/22    Adult Transthoracic Echo Limited W/ Cont if Necessary Per Protocol    Interpretation Summary  · Left ventricular ejection fraction appears to be 56 - 60%.  · Abnormal global longitudinal LV strain (GLS) = -7%.  · Normal right ventricular cavity size, wall thickness, systolic function and septal motion noted.  · Estimated right ventricular systolic pressure from tricuspid regurgitation is normal (<35 mmHg).      Physical Exam:    General Appearance: Intubated mechanically ventilated  Eyes: Pupils equal and reactive    Ears: Appear intact with no abnormalities noted  Nose: Nares normal, no drainage  Neck: supple, trachea midline, no carotid bruit and no JVD  Back: no kyphosis present,    Lungs: respirations regular, respirations " even and respirations unlabored  Heart: normal S1, S2, 2/6 systolic murmur left sternal border  No gallops or rubs  no rub and no click  Abdomen: Soft  Skin: no bleeding, bruising or rash  Extremities: no cyanosis  Psychiatric/Behavioral: Intubated mechanically ventilated      Results Review:   I reviewed the patient's new clinical results.  I reviewed the patient's new imaging results and agree with the interpretation.  I reviewed the patient's other test results and agree with the interpretation  I personally viewed and interpreted the patient's EKG/Telemetry data       Reviewed available prior notes, consults, prior visits, laboratory findings, radiology and cardiology relevant reports.   Updated chart as applicable.   I have reviewed the patient's medical history in detail and updated the computerized patient record as relevant.          Assessment/Plan       Pneumonia due to COVID-19 virus    Acute hypoxemic respiratory failure (HCC)    Acute respiratory distress syndrome (ARDS) due to 2019 novel coronavirus (HCC)    Obesity (BMI 30-39.9)    Acute subsegmental pulmonary embolism without acute cor pulmonale (HCC)    Ventilator dependent (HCC)    Hx of tracheostomy    Severe malnutrition (CMS/HCC)      Plan    Oral amiodarone 200 mg NG tube 3 times a day  Results of echocardiogram were reviewed  No additional cardiac testing required  Right-sided chambers appear normal with normal LV and RV contractility  No obvious signs of any significant volume overload  Defer ventilator management to primary attending and pulmonary medicine  Continue care of bedbound ventilator dependent patient      Sathya Barillas MD  02/05/22  11:30 CST    EMR Dragon/Transcription was used to dictate part of this note

## 2022-02-05 NOTE — PLAN OF CARE
Goal Outcome Evaluation:  Plan of Care Reviewed With: significant other, patient           Outcome Summary: Patient continues on sedation, tolerating well but emotional at times.  Residuals continued to be relatively low (see flowsheets).  Patient nods appropriately but does not follow commands.  Spoke with fiance/significant other beginning of shift.  She states patient has longstanding history of anxiety and depression and exercises frequently to keep controlled.  Ativan given per prn order x1 (see MAR).  Patient noted to be tachypneic, Morphine given x1 (see MAR) with noted improvement.  Large amount of thick secretions suctioned from and around trach.  Turned q2h, safety maintained this shift.

## 2022-02-05 NOTE — PROGRESS NOTES
[facility] Medicine Services  INPATIENT PROGRESS NOTE    Patient Name: Eddy Ferro  Date of Admission: 1/11/2022  Today's Date: 02/05/22  Length of Stay: 25  Primary Care Physician: Provider, No Known    Subjective   Chief Complaint: f/u  HPI     No new event   Remains status quo  No BM today per nurse Julian.  Dr. Ricks at bedside too    When taken off sedation, he nods occ appropriately. Would grimace and cry    Been on ativan and morphine. Dr. Ricks added zyprexa. Will need to monitor re: drug interaction - prolonging qt with amiodarone    Issues: remain to have gastric residual despite erythromycin, reglan and diphenhydramine. Dr. Ricks added simethicone      Review of Systems   Unable to participate in his care      Objective    Temp:  [96.9 °F (36.1 °C)-98.8 °F (37.1 °C)] 97.8 °F (36.6 °C)  Heart Rate:  [] 85  Resp:  [24-32] 31  BP: ()/(58-90) 100/65  FiO2 (%):  [30 %] 30 %  Physical Exam    Clinically ventilated  On TPN  On propofol and Precedex  HEENT:  Atraumatic, Anicteric, Trachea midline, + trach, minimal secretionns from tracheostomy noted; intermittent coughing spell  He is on minimal vent setting   Lungs: equal chest rise bilaterally, no vent desynchrony   Heart: Regular rate and rhythm, no gross murmur.  Maintaining sinus rhythm on telemetry  ABD: Soft, + PEG, no drainage  Moss catheter in place  PICC line on the left arm  Extremities: no cyanosis, no edema  Skin: no obvious rashes or petechiae  Neuro: Unable to assess at this time due to sedation  Psych: Unable to assess at this time due to sedation  Results Review:  I have reviewed the labs, radiology results, and diagnostic studies.    Laboratory Data:   Results from last 7 days   Lab Units 02/05/22  0300 02/03/22  0239 02/02/22  0311   WBC 10*3/mm3 7.63 8.92 8.28   HEMOGLOBIN g/dL 10.6* 10.3* 10.0*   HEMATOCRIT % 33.8* 34.0* 33.0*   PLATELETS 10*3/mm3 214 217 196        Results from  last 7 days   Lab Units 02/05/22  0300 02/03/22  0239 02/02/22  0311   SODIUM mmol/L 138 137 136   POTASSIUM mmol/L 3.7 4.5 3.9   CHLORIDE mmol/L 105 104 99   CO2 mmol/L 25.0 27.0 29.0   BUN mg/dL 16 21* 23*   CREATININE mg/dL 0.34* 0.42* 0.42*   CALCIUM mg/dL 8.3* 8.1* 8.1*   BILIRUBIN mg/dL 1.0 1.3* 1.2   ALK PHOS U/L 184* 172* 148*   ALT (SGPT) U/L 59* 53* 39   AST (SGOT) U/L 61* 69* 45*   GLUCOSE mg/dL 108* 100* 114*       Culture Data:   No results found for: BLOODCX, URINECX, WOUNDCX, MRSACX, RESPCX, STOOLCX    Radiology Data:   Imaging Results (Last 24 Hours)     ** No results found for the last 24 hours. **          I have reviewed the patient's current medications.     Assessment/Plan     Active Hospital Problems    Diagnosis    • **Pneumonia due to COVID-19 virus    • Severe malnutrition (CMS/HCC)    • Hx of tracheostomy    • Ventilator dependent (HCC)    • Acute hypoxemic respiratory failure ([facility])    • Acute respiratory distress syndrome (ARDS) due to 2019 novel coronavirus ([facility])    • Obesity (BMI 30-39.9)    • Acute subsegmental pulmonary embolism without acute cor pulmonale ([facility])    Problem list  Acute hypoxic respiratory failure in the setting of Covid, PE, Klebsiella pneumonia - completed abx  PE -on Lovenox  COVID-19  Nutrition on TPN due to intolerance with gastric retention  Urinary retention  SVT       Plan:  Still has gastric residual. + BM  Had completed Decadron  Had received baricitinib, Remdesivir in outpatient setting  Had completed 7 days of ceftriaxone prior to all other antibiotics received from transferring facility  No current new recommendation.  Discussed with Dr. Ricks.  Zyprexa and simethicone.  Awaiting LTAC   discussed with nurse Julian at bedside  Agree with discontinuing some adjunct medication for COVID.  Patient been here since January 11.    albuterol, 2.5 mg, Nebulization, Q6H - RT  amiodarone, 200 mg, Oral, Q8H   Followed by  [START ON 2/11/2022] amiodarone, 200 mg,  Oral, Q12H   Followed by  [START ON 2/25/2022] amiodarone, 200 mg, Oral, Daily  ascorbic acid, 500 mg, Per PEG Tube, Daily  atorvastatin, 20 mg, Per PEG Tube, Nightly  budesonide-formoterol, 2 puff, Inhalation, BID - RT  cholecalciferol, 1,000 Units, Per PEG Tube, Daily  diphenhydrAMINE, 12.5 mg, Oral, Q6H  enoxaparin, 1 mg/kg, Subcutaneous, Q12H  guaiFENesin, 400 mg, Per PEG Tube, Q8H  metoclopramide, 5 mg, Intravenous, TID  metoprolol tartrate, 5 mg, Intravenous, Q6H  OLANZapine zydis, 5 mg, Oral, BID  oxyCODONE, 5 mg, Per PEG Tube, Q6H  pantoprazole, 40 mg, Intravenous, Q12H  polyethylene glycol, 17 g, Per PEG Tube, Daily  senna-docusate sodium, 1 tablet, Per PEG Tube, BID  terazosin, 1 mg, Per PEG Tube, Nightly  thiamine, 100 mg, Per PEG Tube, Daily  zinc sulfate, 220 mg, Per PEG Tube, Daily          Discharge Planning:   Electronically signed by Bhupinder Moss MD, 02/05/22, 10:06 CST.

## 2022-02-06 LAB
ANION GAP SERPL CALCULATED.3IONS-SCNC: 9 MMOL/L (ref 5–15)
ARTERIAL PATENCY WRIST A: POSITIVE
ATMOSPHERIC PRESS: 758 MMHG
BASE EXCESS BLDA CALC-SCNC: 2.2 MMOL/L (ref 0–2)
BASOPHILS # BLD AUTO: 0.1 10*3/MM3 (ref 0–0.2)
BASOPHILS NFR BLD AUTO: 1.2 % (ref 0–1.5)
BDY SITE: ABNORMAL
BODY TEMPERATURE: 37 C
BUN SERPL-MCNC: 15 MG/DL (ref 6–20)
BUN/CREAT SERPL: 41.7 (ref 7–25)
CALCIUM SPEC-SCNC: 8.4 MG/DL (ref 8.6–10.5)
CHLORIDE SERPL-SCNC: 106 MMOL/L (ref 98–107)
CO2 SERPL-SCNC: 25 MMOL/L (ref 22–29)
CREAT SERPL-MCNC: 0.36 MG/DL (ref 0.76–1.27)
DEPRECATED RDW RBC AUTO: 57.2 FL (ref 37–54)
EOSINOPHIL # BLD AUTO: 1.66 10*3/MM3 (ref 0–0.4)
EOSINOPHIL NFR BLD AUTO: 20 % (ref 0.3–6.2)
ERYTHROCYTE [DISTWIDTH] IN BLOOD BY AUTOMATED COUNT: 19.5 % (ref 12.3–15.4)
GFR SERPL CREATININE-BSD FRML MDRD: >150 ML/MIN/1.73
GLUCOSE SERPL-MCNC: 110 MG/DL (ref 65–99)
HCO3 BLDA-SCNC: 26.7 MMOL/L (ref 20–26)
HCT VFR BLD AUTO: 35.2 % (ref 37.5–51)
HGB BLD-MCNC: 10.9 G/DL (ref 13–17.7)
INHALED O2 CONCENTRATION: 30 %
LYMPHOCYTES # BLD AUTO: 2.35 10*3/MM3 (ref 0.7–3.1)
LYMPHOCYTES NFR BLD AUTO: 28.3 % (ref 19.6–45.3)
Lab: ABNORMAL
MCH RBC QN AUTO: 25.9 PG (ref 26.6–33)
MCHC RBC AUTO-ENTMCNC: 31 G/DL (ref 31.5–35.7)
MCV RBC AUTO: 83.6 FL (ref 79–97)
MODALITY: ABNORMAL
MONOCYTES # BLD AUTO: 0.66 10*3/MM3 (ref 0.1–0.9)
MONOCYTES NFR BLD AUTO: 8 % (ref 5–12)
NEUTROPHILS NFR BLD AUTO: 3.35 10*3/MM3 (ref 1.7–7)
NEUTROPHILS NFR BLD AUTO: 40.4 % (ref 42.7–76)
PAW @ PEAK INSP FLOW SETTING VENT: 15 CMH2O
PCO2 BLDA: 40.4 MM HG (ref 35–45)
PCO2 TEMP ADJ BLD: 40.4 MM HG (ref 35–45)
PEEP RESPIRATORY: 5 CM[H2O]
PH BLDA: 7.43 PH UNITS (ref 7.35–7.45)
PH, TEMP CORRECTED: 7.43 PH UNITS (ref 7.35–7.45)
PLATELET # BLD AUTO: 182 10*3/MM3 (ref 140–450)
PMV BLD AUTO: 12.7 FL (ref 6–12)
PO2 BLDA: 102 MM HG (ref 83–108)
PO2 TEMP ADJ BLD: 102 MM HG (ref 83–108)
POTASSIUM SERPL-SCNC: 4 MMOL/L (ref 3.5–5.2)
RBC # BLD AUTO: 4.21 10*6/MM3 (ref 4.14–5.8)
SAO2 % BLDCOA: 98.2 % (ref 94–99)
SET MECH RESP RATE: 14
SODIUM SERPL-SCNC: 140 MMOL/L (ref 136–145)
VENTILATOR MODE: ABNORMAL
WBC NRBC COR # BLD: 8.29 10*3/MM3 (ref 3.4–10.8)

## 2022-02-06 PROCEDURE — 82803 BLOOD GASES ANY COMBINATION: CPT

## 2022-02-06 PROCEDURE — 25010000002 POTASSIUM CHLORIDE PER 2 MEQ OF POTASSIUM: Performed by: INTERNAL MEDICINE

## 2022-02-06 PROCEDURE — 94799 UNLISTED PULMONARY SVC/PX: CPT

## 2022-02-06 PROCEDURE — 25010000002 METOCLOPRAMIDE PER 10 MG: Performed by: INTERNAL MEDICINE

## 2022-02-06 PROCEDURE — 25010000002 ENOXAPARIN PER 10 MG: Performed by: INTERNAL MEDICINE

## 2022-02-06 PROCEDURE — 94003 VENT MGMT INPAT SUBQ DAY: CPT

## 2022-02-06 PROCEDURE — 25010000002 MAGNESIUM SULFATE PER 500 MG OF MAGNESIUM: Performed by: INTERNAL MEDICINE

## 2022-02-06 PROCEDURE — 93010 ELECTROCARDIOGRAM REPORT: CPT | Performed by: INTERNAL MEDICINE

## 2022-02-06 PROCEDURE — 25010000002 PROPOFOL 10 MG/ML EMULSION: Performed by: INTERNAL MEDICINE

## 2022-02-06 PROCEDURE — 25010000002 CALCIUM GLUCONATE PER 10 ML: Performed by: INTERNAL MEDICINE

## 2022-02-06 PROCEDURE — 36600 WITHDRAWAL OF ARTERIAL BLOOD: CPT

## 2022-02-06 PROCEDURE — 25010000002 MORPHINE SULFATE (PF) 2 MG/ML SOLUTION: Performed by: INTERNAL MEDICINE

## 2022-02-06 PROCEDURE — 93005 ELECTROCARDIOGRAM TRACING: CPT | Performed by: INTERNAL MEDICINE

## 2022-02-06 PROCEDURE — 85025 COMPLETE CBC W/AUTO DIFF WBC: CPT | Performed by: INTERNAL MEDICINE

## 2022-02-06 PROCEDURE — 80048 BASIC METABOLIC PNL TOTAL CA: CPT | Performed by: INTERNAL MEDICINE

## 2022-02-06 PROCEDURE — 99233 SBSQ HOSP IP/OBS HIGH 50: CPT | Performed by: INTERNAL MEDICINE

## 2022-02-06 RX ADMIN — METOCLOPRAMIDE HYDROCHLORIDE 5 MG: 5 INJECTION INTRAMUSCULAR; INTRAVENOUS at 20:29

## 2022-02-06 RX ADMIN — OXYCODONE HYDROCHLORIDE 5 MG: 5 SOLUTION ORAL at 17:28

## 2022-02-06 RX ADMIN — METOCLOPRAMIDE HYDROCHLORIDE 5 MG: 5 INJECTION INTRAMUSCULAR; INTRAVENOUS at 15:47

## 2022-02-06 RX ADMIN — MORPHINE SULFATE 2 MG: 2 INJECTION, SOLUTION INTRAMUSCULAR; INTRAVENOUS at 15:49

## 2022-02-06 RX ADMIN — ALBUTEROL SULFATE 2.5 MG: 2.5 SOLUTION RESPIRATORY (INHALATION) at 07:08

## 2022-02-06 RX ADMIN — GUAIFENESIN 400 MG: 100 SOLUTION ORAL at 05:33

## 2022-02-06 RX ADMIN — METOPROLOL TARTRATE 5 MG: 5 INJECTION, SOLUTION INTRAVENOUS at 20:29

## 2022-02-06 RX ADMIN — ENOXAPARIN SODIUM 90 MG: 100 INJECTION SUBCUTANEOUS at 11:34

## 2022-02-06 RX ADMIN — PANTOPRAZOLE SODIUM 40 MG: 40 INJECTION, POWDER, FOR SOLUTION INTRAVENOUS at 08:34

## 2022-02-06 RX ADMIN — OXYCODONE HYDROCHLORIDE 5 MG: 5 SOLUTION ORAL at 23:54

## 2022-02-06 RX ADMIN — ALBUTEROL SULFATE 2.5 MG: 2.5 SOLUTION RESPIRATORY (INHALATION) at 18:00

## 2022-02-06 RX ADMIN — PANTOPRAZOLE SODIUM 40 MG: 40 INJECTION, POWDER, FOR SOLUTION INTRAVENOUS at 20:29

## 2022-02-06 RX ADMIN — METOPROLOL TARTRATE 5 MG: 5 INJECTION, SOLUTION INTRAVENOUS at 14:49

## 2022-02-06 RX ADMIN — AMIODARONE HYDROCHLORIDE 200 MG: 200 TABLET ORAL at 08:34

## 2022-02-06 RX ADMIN — METOPROLOL TARTRATE 5 MG: 5 INJECTION, SOLUTION INTRAVENOUS at 02:00

## 2022-02-06 RX ADMIN — DEXMEDETOMIDINE HYDROCHLORIDE 0.4 MCG/KG/HR: 4 INJECTION, SOLUTION INTRAVENOUS at 04:29

## 2022-02-06 RX ADMIN — ATORVASTATIN CALCIUM 20 MG: 10 TABLET, FILM COATED ORAL at 20:29

## 2022-02-06 RX ADMIN — DIPHENHYDRAMINE HYDROCHLORIDE 12.5 MG: 12.5 SOLUTION ORAL at 05:33

## 2022-02-06 RX ADMIN — AMIODARONE HYDROCHLORIDE 200 MG: 200 TABLET ORAL at 17:29

## 2022-02-06 RX ADMIN — TERAZOSIN HYDROCHLORIDE 1 MG: 1 CAPSULE ORAL at 20:30

## 2022-02-06 RX ADMIN — PROPOFOL 60 MCG/KG/MIN: 10 INJECTION, EMULSION INTRAVENOUS at 09:35

## 2022-02-06 RX ADMIN — BUDESONIDE AND FORMOTEROL FUMARATE DIHYDRATE 2 PUFF: 160; 4.5 AEROSOL RESPIRATORY (INHALATION) at 07:08

## 2022-02-06 RX ADMIN — DIPHENHYDRAMINE HYDROCHLORIDE 12.5 MG: 12.5 SOLUTION ORAL at 11:34

## 2022-02-06 RX ADMIN — DIPHENHYDRAMINE HYDROCHLORIDE 12.5 MG: 12.5 SOLUTION ORAL at 17:28

## 2022-02-06 RX ADMIN — DIPHENHYDRAMINE HYDROCHLORIDE 12.5 MG: 12.5 SOLUTION ORAL at 23:55

## 2022-02-06 RX ADMIN — METOPROLOL TARTRATE 5 MG: 5 INJECTION, SOLUTION INTRAVENOUS at 08:34

## 2022-02-06 RX ADMIN — MORPHINE SULFATE 2 MG: 2 INJECTION, SOLUTION INTRAMUSCULAR; INTRAVENOUS at 23:57

## 2022-02-06 RX ADMIN — GUAIFENESIN 400 MG: 100 SOLUTION ORAL at 21:01

## 2022-02-06 RX ADMIN — AMIODARONE HYDROCHLORIDE 200 MG: 200 TABLET ORAL at 02:00

## 2022-02-06 RX ADMIN — PROPOFOL 65 MCG/KG/MIN: 10 INJECTION, EMULSION INTRAVENOUS at 04:29

## 2022-02-06 RX ADMIN — OLANZAPINE 5 MG: 5 TABLET, ORALLY DISINTEGRATING ORAL at 08:34

## 2022-02-06 RX ADMIN — PROPOFOL 60 MCG/KG/MIN: 10 INJECTION, EMULSION INTRAVENOUS at 07:24

## 2022-02-06 RX ADMIN — DEXMEDETOMIDINE HYDROCHLORIDE 0.9 MCG/KG/HR: 4 INJECTION, SOLUTION INTRAVENOUS at 09:35

## 2022-02-06 RX ADMIN — PROPOFOL 40 MCG/KG/MIN: 10 INJECTION, EMULSION INTRAVENOUS at 12:45

## 2022-02-06 RX ADMIN — ALBUTEROL SULFATE 2.5 MG: 2.5 SOLUTION RESPIRATORY (INHALATION) at 12:09

## 2022-02-06 RX ADMIN — ENOXAPARIN SODIUM 90 MG: 100 INJECTION SUBCUTANEOUS at 23:54

## 2022-02-06 RX ADMIN — MORPHINE SULFATE 2 MG: 2 INJECTION, SOLUTION INTRAMUSCULAR; INTRAVENOUS at 08:42

## 2022-02-06 RX ADMIN — PROPOFOL 35 MCG/KG/MIN: 10 INJECTION, EMULSION INTRAVENOUS at 20:10

## 2022-02-06 RX ADMIN — DEXMEDETOMIDINE HYDROCHLORIDE 1 MCG/KG/HR: 4 INJECTION, SOLUTION INTRAVENOUS at 20:05

## 2022-02-06 RX ADMIN — POLYETHYLENE GLYCOL 3350 17 G: 17 POWDER, FOR SOLUTION ORAL at 08:34

## 2022-02-06 RX ADMIN — OXYCODONE HYDROCHLORIDE 5 MG: 5 SOLUTION ORAL at 11:34

## 2022-02-06 RX ADMIN — PROPOFOL 65 MCG/KG/MIN: 10 INJECTION, EMULSION INTRAVENOUS at 02:00

## 2022-02-06 RX ADMIN — POTASSIUM PHOSPHATE, MONOBASIC POTASSIUM PHOSPHATE, DIBASIC: 224; 236 INJECTION, SOLUTION, CONCENTRATE INTRAVENOUS at 17:28

## 2022-02-06 RX ADMIN — OLANZAPINE 5 MG: 5 TABLET, ORALLY DISINTEGRATING ORAL at 20:29

## 2022-02-06 RX ADMIN — DOCUSATE SODIUM 50 MG AND SENNOSIDES 8.6 MG 1 TABLET: 8.6; 5 TABLET, FILM COATED ORAL at 20:29

## 2022-02-06 RX ADMIN — BUDESONIDE AND FORMOTEROL FUMARATE DIHYDRATE 2 PUFF: 160; 4.5 AEROSOL RESPIRATORY (INHALATION) at 18:00

## 2022-02-06 RX ADMIN — DOCUSATE SODIUM 50 MG AND SENNOSIDES 8.6 MG 1 TABLET: 8.6; 5 TABLET, FILM COATED ORAL at 08:34

## 2022-02-06 RX ADMIN — DEXMEDETOMIDINE HYDROCHLORIDE 0.9 MCG/KG/HR: 4 INJECTION, SOLUTION INTRAVENOUS at 14:49

## 2022-02-06 RX ADMIN — GUAIFENESIN 400 MG: 100 SOLUTION ORAL at 13:30

## 2022-02-06 RX ADMIN — PROPOFOL 40 MCG/KG/MIN: 10 INJECTION, EMULSION INTRAVENOUS at 16:19

## 2022-02-06 RX ADMIN — OXYCODONE HYDROCHLORIDE 5 MG: 5 SOLUTION ORAL at 05:33

## 2022-02-06 RX ADMIN — METOCLOPRAMIDE HYDROCHLORIDE 5 MG: 5 INJECTION INTRAMUSCULAR; INTRAVENOUS at 08:34

## 2022-02-06 RX ADMIN — ALBUTEROL SULFATE 2.5 MG: 2.5 SOLUTION RESPIRATORY (INHALATION) at 23:01

## 2022-02-06 NOTE — PROGRESS NOTES
[facility] Medicine Services  INPATIENT PROGRESS NOTE    Patient Name: Eddy Ferro  Date of Admission: 1/11/2022  Today's Date: 02/06/22  Length of Stay: 26  Primary Care Physician: Provider, No Known    Subjective   Chief Complaint: f/u   HPI   Patient remain mechanically ventilated.  Patient becomes anxious and could not tolerate weaning off sedation.   Has issue of narrow complex tachy once weaned down of Precedex. Still requiring Propofol.  Now on amiodarone via NGT  Issue still on gastric residual despite on erythromycin, reglan and diphehydramine   Referred to LTAC from previous week.  Awaiting response    Had PEG (1/27)and Trach (1/25)      Review of Systems   Unable to participate care    Objective    Temp:  [97.4 °F (36.3 °C)-100.1 °F (37.8 °C)] 100.1 °F (37.8 °C)  Heart Rate:  [] 104  Resp:  [30-42] 42  BP: ()/(54-99) 129/87  FiO2 (%):  [30 %] 30 %  Physical Exam    Clinically ventilated  On TPN  On propofol (60) and Precedex (0.9)  HEENT:  Atraumatic, Anicteric, Trachea midline, + trach, minimal secretions from tracheostomy noted  He is on minimal vent setting   Lungs: equal chest rise bilaterally, no vent desynchrony   Heart: Regular rate and rhythm, no gross murmur.  Maintaining sinus rhythm on telemetry  ABD: Soft, + PEG, no drainage  Moss catheter in place  PICC line on the left arm  Extremities: no cyanosis, no edema  Skin: no obvious rashes or petechiae  Neuro: Unable to assess at this time due to sedation  Psych: Unable to assess at this time due to sedation    Results Review:  I have reviewed the labs, radiology results, and diagnostic studies.    Laboratory Data:   Results from last 7 days   Lab Units 02/06/22  0629 02/05/22  0300 02/03/22  0239   WBC 10*3/mm3 8.29 7.63 8.92   HEMOGLOBIN g/dL 10.9* 10.6* 10.3*   HEMATOCRIT % 35.2* 33.8* 34.0*   PLATELETS 10*3/mm3 182 214 217        Results from last 7 days   Lab Units 02/06/22  0629  02/05/22  0300 02/03/22  0239 02/02/22  0311 02/02/22  0311   SODIUM mmol/L 140 138 137   < > 136   POTASSIUM mmol/L 4.0 3.7 4.5   < > 3.9   CHLORIDE mmol/L 106 105 104   < > 99   CO2 mmol/L 25.0 25.0 27.0   < > 29.0   BUN mg/dL 15 16 21*   < > 23*   CREATININE mg/dL 0.36* 0.34* 0.42*   < > 0.42*   CALCIUM mg/dL 8.4* 8.3* 8.1*   < > 8.1*   BILIRUBIN mg/dL  --  1.0 1.3*  --  1.2   ALK PHOS U/L  --  184* 172*  --  148*   ALT (SGPT) U/L  --  59* 53*  --  39   AST (SGOT) U/L  --  61* 69*  --  45*   GLUCOSE mg/dL 110* 108* 100*   < > 114*    < > = values in this interval not displayed.       Culture Data:   No results found for: BLOODCX, URINECX, WOUNDCX, MRSACX, RESPCX, STOOLCX    Radiology Data:   Imaging Results (Last 24 Hours)     ** No results found for the last 24 hours. **          I have reviewed the patient's current medications.     Assessment/Plan     Active Hospital Problems    Diagnosis    • **Pneumonia due to COVID-19 virus    • Severe malnutrition (CMS/HCC)    • Hx of tracheostomy    • Ventilator dependent (HCC)    • Acute hypoxemic respiratory failure (HCC)    • Acute respiratory distress syndrome (ARDS) due to 2019 novel coronavirus (HCC)    • Obesity (BMI 30-39.9)    • Acute subsegmental pulmonary embolism without acute cor pulmonale (HCC)        Problem list  Acute hypoxic respiratory failure in the setting of Covid, PE, Klebsiella pneumonia - completed abx  PE -on Lovenox  COVID-19  Nutrition on TPN due to intolerance with gastric retention  Gastric retention  Urinary retention  SVT -beta-blocker, amiodarone      Plan:  Status post post PEG (1/27) and trach (1/25)  Had completed Decadron  Had received baricitinib, Remdesivir in outpatient setting  Had completed 7 days of ceftriaxone prior to all other antibiotics received from transferring facility  Continue TPN  Olanzapine added February 5  Apprisflorentin Tavera of current condition.     albuterol, 2.5 mg, Nebulization, Q6H - RT  amiodarone, 200  mg, Oral, Q8H   Followed by  [START ON 2/11/2022] amiodarone, 200 mg, Oral, Q12H   Followed by  [START ON 2/25/2022] amiodarone, 200 mg, Oral, Daily  atorvastatin, 20 mg, Per PEG Tube, Nightly  budesonide-formoterol, 2 puff, Inhalation, BID - RT  diphenhydrAMINE, 12.5 mg, Oral, Q6H  enoxaparin, 1 mg/kg, Subcutaneous, Q12H  guaiFENesin, 400 mg, Per PEG Tube, Q8H  metoclopramide, 5 mg, Intravenous, TID  metoprolol tartrate, 5 mg, Intravenous, Q6H  OLANZapine zydis, 5 mg, Oral, BID  oxyCODONE, 5 mg, Per PEG Tube, Q6H  pantoprazole, 40 mg, Intravenous, Q12H  polyethylene glycol, 17 g, Per PEG Tube, Daily  senna-docusate sodium, 1 tablet, Per PEG Tube, BID  terazosin, 1 mg, Per PEG Tube, Nightly          Discharge Planning:tbd  Electronically signed by Bhupinder Moss MD, 02/06/22, 08:17 CST.

## 2022-02-06 NOTE — SIGNIFICANT NOTE
02/06/22 0807   Readings   Plateau Pressure (cm H2O) 18 cm H2O   Static Compliance (L/cm H2O) 35   Dynamic Compliance (L/cm H2O) 37 L/cm H2O

## 2022-02-06 NOTE — PLAN OF CARE
Goal Outcome Evaluation:  Plan of Care Reviewed With: other (see comments) (Iesha LEYVA)        Progress: no change  Outcome Summary: Per discussion with GORDON Julian MD desires to start trickle feed today. Rec: restart Peptamen Intense VHP at 10ml/hr, water flush at 20 ml/hr to intake via PEG tube no more that 1 oz/hr. Do not advance feeds in the next 24 hrs. RD will continue to follow

## 2022-02-06 NOTE — PROGRESS NOTES
PULMONARY AND CRITICAL CARE PROGRESS NOTE - [facility]    Patient: Eddy Ferro  1970   MR# [id number]   Acct# [id number]  02/06/22   12:08 CST  Referring Provider: Bhupinder Moss*    Chief Complaint: Acute respiratory failure.  Mechanical ventilation.  COVID-19 pneumonia, tracheostomy and PEG placement done.    Interval history: Patient was seen in the follow-up visit in pulmonary rounds in intensive care unit today.  He is resting comfortably on ventilator and is sedated..  He is on propofol and Precedex.  Current ventilator settings are pressure control ventilation with PI of 15, PEEP of 5, rate of 14, 30% FiO2 98% oxygen saturation.  Patient is out of Covid isolation.  His observed tidal volumes 550.  ABG reviewed and stable.  He continues on TPN and is unable to tolerate any tube feedings due to increased residual.  He is already getting Reglan.  Afebrile.  He is started on sublingual Zyprexa for anxiety.  His respiratory rate remains high around 30s.  He needs to be heavily sedated so far and weaning attempts complicated by increased agitation and anxiety.  No new events overnight.  He is afebrile.      Meds:  albuterol, 2.5 mg, Nebulization, Q6H - RT  amiodarone, 200 mg, Oral, Q8H   Followed by  [START ON 2/11/2022] amiodarone, 200 mg, Oral, Q12H   Followed by  [START ON 2/25/2022] amiodarone, 200 mg, Oral, Daily  atorvastatin, 20 mg, Per PEG Tube, Nightly  budesonide-formoterol, 2 puff, Inhalation, BID - RT  diphenhydrAMINE, 12.5 mg, Oral, Q6H  enoxaparin, 1 mg/kg, Subcutaneous, Q12H  guaiFENesin, 400 mg, Per PEG Tube, Q8H  metoclopramide, 5 mg, Intravenous, TID  metoprolol tartrate, 5 mg, Intravenous, Q6H  OLANZapine zydis, 5 mg, Oral, BID  oxyCODONE, 5 mg, Per PEG Tube, Q6H  pantoprazole, 40 mg, Intravenous, Q12H  polyethylene glycol, 17 g, Per PEG Tube, Daily  senna-docusate sodium, 1 tablet, Per PEG Tube, BID  terazosin, 1 mg, Per PEG Tube, Nightly      Adult Custom  Central TPN, , Last Rate: 80 mL/hr at 02/05/22 1705  Adult Custom Central TPN, , Last Rate: 80 mL/hr at 02/04/22 1706  dexmedetomidine, 0.2-1.5 mcg/kg/hr, Last Rate: 0.9 mcg/kg/hr (02/06/22 0935)  propofol, 5-75 mcg/kg/min, Last Rate: 50 mcg/kg/min (02/06/22 1133)      Review of Systems:   Review of Systems   Unable to perform ROS: Intubated     Physical Exam:  SpO2 Percentage    02/06/22 1000 02/06/22 1100 02/06/22 1200   SpO2: 98% 99% 98%     Body mass index is 29.7 kg/m².   Temp:  [97.4 °F (36.3 °C)-100.1 °F (37.8 °C)] 98.2 °F (36.8 °C)  Heart Rate:  [] 81  Resp:  [30-42] 42  BP: (101-138)/(62-99) 101/64  FiO2 (%):  [30 %] 30 %    Intake/Output Summary (Last 24 hours) at 2/6/2022 1208  Last data filed at 2/6/2022 1133  Gross per 24 hour   Intake 3040.5 ml   Output 3550 ml   Net -509.5 ml     Physical Exam  Constitutional:       General: He is not in acute distress.     Appearance: He is ill-appearing. He is not diaphoretic.      Interventions: He is sedated.   HENT:      Head: Normocephalic.      Nose: Nose normal.      Mouth/Throat:      Mouth: Mucous membranes are moist.   Eyes:      General: No scleral icterus.  Neck:      Comments: Vent to trach  Cardiovascular:      Rate and Rhythm: Tachycardia present. Rhythm irregular.      Comments: Rate 101  Pulmonary:      Effort: Pulmonary effort is normal. No respiratory distress.      Breath sounds: Rhonchi present. No wheezing.   Abdominal:      General: There is no distension.      Comments: PEG   Musculoskeletal:      Right lower leg: No edema.      Left lower leg: No edema.   Skin:     Coloration: Skin is not pale.   Neurological:      Comments: Sedated         Laboratory Data:  Results from last 7 days   Lab Units 02/06/22  0629 02/05/22  0300 02/03/22  0239   WBC 10*3/mm3 8.29 7.63 8.92   HEMOGLOBIN g/dL 10.9* 10.6* 10.3*   PLATELETS 10*3/mm3 182 214 217     Results from last 7 days   Lab Units 02/06/22  0629 02/05/22  0300 02/03/22  0239   SODIUM mmol/L  140 138 137   POTASSIUM mmol/L 4.0 3.7 4.5   BUN mg/dL 15 16 21*   CREATININE mg/dL 0.36* 0.34* 0.42*     Results from last 7 days   Lab Units 02/06/22  0309 02/05/22  0329 02/04/22  0305   PH, ARTERIAL pH units 7.429 7.405 7.407   PCO2, ARTERIAL mm Hg 40.4 43.3 42.0   PO2 ART mm Hg 102.0 101.0 104.0   FIO2 % 30 30 30     No results found for: BLOODCX, URINECX, WOUNDCX, MRSACX, RESPCX, STOOLCX  Recent films:  No radiology results for the last day  Films reviewed personally by me.       My radiograph interpretation/independent review of imaging: No new imaging studies today.       Pulmonary Assessment:     1. Acute respiratory failure due to COVID-19   2. On mechanical ventilation failed weaning trials  3. S/p tracheostomy placement done today.  4. SARS-COV2 viral pneumonia  5. Status post completion of remdesivir and baricitinib  6. Secondary bacterial infection with prior history of baricitinib therapy  7. Pulmonary embolism stable  8. Ventilator dyssynchrony improved  9. S/p PEG tube placement today.  10. Atrial fibrillation with rapid ventricle response     Recommend/plan:   · Reviewed current ventilator settings and we'll continue the current ventilator settings.  · Atrial fibrillation is better controlled.  He was on amiodarone and cardiology adjusted his drips.  Continue on p.o./per tube amiodarone.  · He is on PEEP of 5 and FiO2 30% oxygen saturation 97% and resting comfortably without ventilator dyssynchrony.    · He had tracheostomy placed and ENT following.  · No plan for any spontaneous breathing trial due to cardiac arrhythmias we will try it later when he is more stable.  · His respiratory still remains high and he is already on the propofol and Precedex and also getting morphine as needed and scheduled oxycodone.  · Continue tube feedings with PEG tube when tolerated.. He has increased residual and he is unable to tolerate tube feed and I advised to get started on small-volume tube feed as tolerated  and dietary consult has been requested.  · Continue Symbicort.  Change albuterol to Atrovent due to cardiac arrhythmia  · Pulmonary toilet and Mucinex for mucolytic treatment.  · Continue long-term anticoagulation for pulmonary embolism.  · Antibiotics completed.  He has increased anxiety and started on sublingual Zyprexa.  · Stress ulcer prophylaxis with Protonix.  · Continue TPN for nutritional support for now and transition to tube feeding when possible.  · Patient completed dexamethasone.Continue adjunct treatment for COVID-19.  · Continue pain anxiety control and sedation.   · Continue current sedation and titrate as needed  ·  Repeat labs and imaging studies as needed.  · CODE STATUS: Full  · Overall prognosis: Guarded.  · He is awaiting for LTAC placement.  He is actually from [place] and is waiting for placement in a skilled nursing facility with ventilator if he is unable to wean.  · We will follow.     Electronically signed by     Florence Ricks MD  Pulmonologist/Intensivist  2/6/2022 12:08 CST

## 2022-02-07 ENCOUNTER — APPOINTMENT (OUTPATIENT)
Dept: GENERAL RADIOLOGY | Facility: HOSPITAL | Age: 52
End: 2022-02-07

## 2022-02-07 ENCOUNTER — APPOINTMENT (OUTPATIENT)
Dept: CT IMAGING | Facility: HOSPITAL | Age: 52
End: 2022-02-07

## 2022-02-07 PROBLEM — R04.2 HEMOPTYSIS: Status: ACTIVE | Noted: 2022-02-07

## 2022-02-07 PROBLEM — D89.834 CYTOKINE RELEASE SYNDROME, GRADE 4: Status: ACTIVE | Noted: 2022-02-07

## 2022-02-07 LAB
ABO GROUP BLD: NORMAL
ARTERIAL PATENCY WRIST A: POSITIVE
ATMOSPHERIC PRESS: 756 MMHG
BASE EXCESS BLDA CALC-SCNC: 2.8 MMOL/L (ref 0–2)
BDY SITE: ABNORMAL
BLD GP AB SCN SERPL QL: NEGATIVE
BODY TEMPERATURE: 37 C
HCO3 BLDA-SCNC: 26.4 MMOL/L (ref 20–26)
HCT VFR BLD AUTO: 38.3 % (ref 37.5–51)
HGB BLD-MCNC: 12.1 G/DL (ref 13–17.7)
INHALED O2 CONCENTRATION: 30 %
Lab: ABNORMAL
MODALITY: ABNORMAL
PAW @ PEAK INSP FLOW SETTING VENT: 20 CMH2O
PCO2 BLDA: 36.2 MM HG (ref 35–45)
PCO2 TEMP ADJ BLD: 36.2 MM HG (ref 35–45)
PEEP RESPIRATORY: 5 CM[H2O]
PH BLDA: 7.47 PH UNITS (ref 7.35–7.45)
PH, TEMP CORRECTED: 7.47 PH UNITS (ref 7.35–7.45)
PO2 BLDA: 107 MM HG (ref 83–108)
PO2 TEMP ADJ BLD: 107 MM HG (ref 83–108)
QT INTERVAL: 320 MS
QTC INTERVAL: 450 MS
RH BLD: POSITIVE
SAO2 % BLDCOA: 98.6 % (ref 94–99)
SET MECH RESP RATE: 14
T&S EXPIRATION DATE: NORMAL
TRIGL SERPL-MCNC: 181 MG/DL (ref 0–150)
VENTILATOR MODE: ABNORMAL

## 2022-02-07 PROCEDURE — 85014 HEMATOCRIT: CPT | Performed by: INTERNAL MEDICINE

## 2022-02-07 PROCEDURE — 70498 CT ANGIOGRAPHY NECK: CPT

## 2022-02-07 PROCEDURE — 94799 UNLISTED PULMONARY SVC/PX: CPT

## 2022-02-07 PROCEDURE — 86900 BLOOD TYPING SEROLOGIC ABO: CPT | Performed by: OTOLARYNGOLOGY

## 2022-02-07 PROCEDURE — 86901 BLOOD TYPING SEROLOGIC RH(D): CPT | Performed by: OTOLARYNGOLOGY

## 2022-02-07 PROCEDURE — 0BJ18ZZ INSPECTION OF TRACHEA, VIA NATURAL OR ARTIFICIAL OPENING ENDOSCOPIC: ICD-10-PCS | Performed by: OTOLARYNGOLOGY

## 2022-02-07 PROCEDURE — 25010000002 LORAZEPAM PER 2 MG: Performed by: NURSE PRACTITIONER

## 2022-02-07 PROCEDURE — 0 IOPAMIDOL PER 1 ML: Performed by: INTERNAL MEDICINE

## 2022-02-07 PROCEDURE — 86850 RBC ANTIBODY SCREEN: CPT | Performed by: OTOLARYNGOLOGY

## 2022-02-07 PROCEDURE — 71275 CT ANGIOGRAPHY CHEST: CPT

## 2022-02-07 PROCEDURE — 99233 SBSQ HOSP IP/OBS HIGH 50: CPT | Performed by: OTOLARYNGOLOGY

## 2022-02-07 PROCEDURE — 25010000002 PROPOFOL 10 MG/ML EMULSION: Performed by: INTERNAL MEDICINE

## 2022-02-07 PROCEDURE — 71045 X-RAY EXAM CHEST 1 VIEW: CPT

## 2022-02-07 PROCEDURE — 31575 DIAGNOSTIC LARYNGOSCOPY: CPT | Performed by: OTOLARYNGOLOGY

## 2022-02-07 PROCEDURE — 31525 DX LARYNGOSCOPY EXCL NB: CPT | Performed by: OTOLARYNGOLOGY

## 2022-02-07 PROCEDURE — 25010000002 ENOXAPARIN PER 10 MG: Performed by: INTERNAL MEDICINE

## 2022-02-07 PROCEDURE — 25010000002 POTASSIUM CHLORIDE PER 2 MEQ OF POTASSIUM: Performed by: INTERNAL MEDICINE

## 2022-02-07 PROCEDURE — 99233 SBSQ HOSP IP/OBS HIGH 50: CPT | Performed by: INTERNAL MEDICINE

## 2022-02-07 PROCEDURE — 36600 WITHDRAWAL OF ARTERIAL BLOOD: CPT

## 2022-02-07 PROCEDURE — 85018 HEMOGLOBIN: CPT | Performed by: INTERNAL MEDICINE

## 2022-02-07 PROCEDURE — 82803 BLOOD GASES ANY COMBINATION: CPT

## 2022-02-07 PROCEDURE — 25010000002 MAGNESIUM SULFATE PER 500 MG OF MAGNESIUM: Performed by: INTERNAL MEDICINE

## 2022-02-07 PROCEDURE — 25010000002 METOCLOPRAMIDE PER 10 MG: Performed by: INTERNAL MEDICINE

## 2022-02-07 PROCEDURE — 25010000002 CALCIUM GLUCONATE PER 10 ML: Performed by: INTERNAL MEDICINE

## 2022-02-07 PROCEDURE — 0C9M8ZZ DRAINAGE OF PHARYNX, VIA NATURAL OR ARTIFICIAL OPENING ENDOSCOPIC: ICD-10-PCS | Performed by: OTOLARYNGOLOGY

## 2022-02-07 PROCEDURE — 84478 ASSAY OF TRIGLYCERIDES: CPT | Performed by: INTERNAL MEDICINE

## 2022-02-07 PROCEDURE — 94003 VENT MGMT INPAT SUBQ DAY: CPT

## 2022-02-07 RX ORDER — POLYETHYLENE GLYCOL 3350 17 G/17G
17 POWDER, FOR SOLUTION ORAL DAILY
Start: 2022-02-08

## 2022-02-07 RX ORDER — AMIODARONE HYDROCHLORIDE 200 MG/1
200 TABLET ORAL EVERY 12 HOURS
Qty: 28 TABLET | Refills: 0
Start: 2022-02-11 | End: 2022-02-25

## 2022-02-07 RX ORDER — AMIODARONE HYDROCHLORIDE 200 MG/1
200 TABLET ORAL EVERY 8 HOURS
Qty: 11 TABLET | Refills: 0
Start: 2022-02-07 | End: 2022-02-11

## 2022-02-07 RX ORDER — OXYCODONE HCL 5 MG/5 ML
5 SOLUTION, ORAL ORAL EVERY 6 HOURS
Start: 2022-02-07

## 2022-02-07 RX ORDER — PANTOPRAZOLE SODIUM 40 MG/10ML
40 INJECTION, POWDER, LYOPHILIZED, FOR SOLUTION INTRAVENOUS EVERY 12 HOURS SCHEDULED
Start: 2022-02-07

## 2022-02-07 RX ORDER — LORAZEPAM 2 MG/ML
1 INJECTION INTRAMUSCULAR EVERY 4 HOURS PRN
Status: DISCONTINUED | OUTPATIENT
Start: 2022-02-07 | End: 2022-02-09 | Stop reason: HOSPADM

## 2022-02-07 RX ORDER — ALBUTEROL SULFATE 2.5 MG/3ML
2.5 SOLUTION RESPIRATORY (INHALATION)
Refills: 12
Start: 2022-02-07

## 2022-02-07 RX ORDER — TERAZOSIN 1 MG/1
1 CAPSULE ORAL NIGHTLY
Start: 2022-02-07

## 2022-02-07 RX ORDER — METOCLOPRAMIDE HYDROCHLORIDE 5 MG/ML
5 INJECTION INTRAMUSCULAR; INTRAVENOUS 3 TIMES DAILY
Start: 2022-02-07

## 2022-02-07 RX ORDER — DEXTROMETHORPHAN POLISTIREX 30 MG/5ML
60 SUSPENSION ORAL EVERY 12 HOURS PRN
Qty: 280 ML
Start: 2022-02-07

## 2022-02-07 RX ORDER — METOPROLOL TARTRATE 5 MG/5ML
5 INJECTION INTRAVENOUS EVERY 6 HOURS
Qty: 15 ML
Start: 2022-02-07

## 2022-02-07 RX ORDER — BISACODYL 10 MG
10 SUPPOSITORY, RECTAL RECTAL DAILY PRN
Start: 2022-02-07

## 2022-02-07 RX ORDER — AMIODARONE HYDROCHLORIDE 200 MG/1
200 TABLET ORAL DAILY
Start: 2022-02-25

## 2022-02-07 RX ORDER — AMOXICILLIN 250 MG
1 CAPSULE ORAL 2 TIMES DAILY
Start: 2022-02-07

## 2022-02-07 RX ORDER — TRANEXAMIC ACID 100 MG/ML
100 INJECTION, SOLUTION INTRAVENOUS ONCE
Status: COMPLETED | OUTPATIENT
Start: 2022-02-07 | End: 2022-02-07

## 2022-02-07 RX ORDER — BUDESONIDE AND FORMOTEROL FUMARATE DIHYDRATE 160; 4.5 UG/1; UG/1
2 AEROSOL RESPIRATORY (INHALATION)
Refills: 12
Start: 2022-02-07

## 2022-02-07 RX ORDER — ACETAMINOPHEN 160 MG/5ML
650 SOLUTION ORAL EVERY 6 HOURS PRN
Start: 2022-02-07

## 2022-02-07 RX ORDER — MORPHINE SULFATE 2 MG/ML
2 INJECTION, SOLUTION INTRAMUSCULAR; INTRAVENOUS
Status: DISCONTINUED | OUTPATIENT
Start: 2022-02-07 | End: 2022-02-09 | Stop reason: HOSPADM

## 2022-02-07 RX ORDER — LORAZEPAM 2 MG/ML
1 INJECTION INTRAMUSCULAR EVERY 4 HOURS PRN
Start: 2022-02-07

## 2022-02-07 RX ORDER — DEXMEDETOMIDINE HYDROCHLORIDE 4 UG/ML
.2-1.5 INJECTION, SOLUTION INTRAVENOUS
Start: 2022-02-07

## 2022-02-07 RX ORDER — ATORVASTATIN CALCIUM 20 MG/1
20 TABLET, FILM COATED ORAL NIGHTLY
Start: 2022-02-07

## 2022-02-07 RX ORDER — DIPHENHYDRAMINE HCL 12.5MG/5ML
12.5 LIQUID (ML) ORAL EVERY 6 HOURS SCHEDULED
Start: 2022-02-07

## 2022-02-07 RX ADMIN — DIPHENHYDRAMINE HYDROCHLORIDE 12.5 MG: 12.5 SOLUTION ORAL at 12:54

## 2022-02-07 RX ADMIN — DIPHENHYDRAMINE HYDROCHLORIDE 12.5 MG: 12.5 SOLUTION ORAL at 06:11

## 2022-02-07 RX ADMIN — METOPROLOL TARTRATE 5 MG: 5 INJECTION, SOLUTION INTRAVENOUS at 09:41

## 2022-02-07 RX ADMIN — DEXMEDETOMIDINE HYDROCHLORIDE 1.4 MCG/KG/HR: 4 INJECTION, SOLUTION INTRAVENOUS at 19:15

## 2022-02-07 RX ADMIN — ALBUTEROL SULFATE 2.5 MG: 2.5 SOLUTION RESPIRATORY (INHALATION) at 23:21

## 2022-02-07 RX ADMIN — DEXMEDETOMIDINE HYDROCHLORIDE 1 MCG/KG/HR: 4 INJECTION, SOLUTION INTRAVENOUS at 00:19

## 2022-02-07 RX ADMIN — PROPOFOL 30 MCG/KG/MIN: 10 INJECTION, EMULSION INTRAVENOUS at 00:18

## 2022-02-07 RX ADMIN — OXYCODONE HYDROCHLORIDE 5 MG: 5 SOLUTION ORAL at 18:20

## 2022-02-07 RX ADMIN — PANTOPRAZOLE SODIUM 40 MG: 40 INJECTION, POWDER, FOR SOLUTION INTRAVENOUS at 09:41

## 2022-02-07 RX ADMIN — PANTOPRAZOLE SODIUM 40 MG: 40 INJECTION, POWDER, FOR SOLUTION INTRAVENOUS at 21:56

## 2022-02-07 RX ADMIN — BUDESONIDE AND FORMOTEROL FUMARATE DIHYDRATE 2 PUFF: 160; 4.5 AEROSOL RESPIRATORY (INHALATION) at 20:28

## 2022-02-07 RX ADMIN — GUAIFENESIN 400 MG: 100 SOLUTION ORAL at 21:55

## 2022-02-07 RX ADMIN — METOPROLOL TARTRATE 5 MG: 5 INJECTION, SOLUTION INTRAVENOUS at 15:35

## 2022-02-07 RX ADMIN — LORAZEPAM 1 MG: 2 INJECTION INTRAMUSCULAR; INTRAVENOUS at 18:32

## 2022-02-07 RX ADMIN — IOPAMIDOL 125 ML: 755 INJECTION, SOLUTION INTRAVENOUS at 21:13

## 2022-02-07 RX ADMIN — OXYCODONE HYDROCHLORIDE 5 MG: 5 SOLUTION ORAL at 06:11

## 2022-02-07 RX ADMIN — OXYCODONE HYDROCHLORIDE 5 MG: 5 SOLUTION ORAL at 12:54

## 2022-02-07 RX ADMIN — PROPOFOL 25 MCG/KG/MIN: 10 INJECTION, EMULSION INTRAVENOUS at 07:18

## 2022-02-07 RX ADMIN — AMIODARONE HYDROCHLORIDE 200 MG: 200 TABLET ORAL at 18:20

## 2022-02-07 RX ADMIN — GUAIFENESIN 400 MG: 100 SOLUTION ORAL at 06:11

## 2022-02-07 RX ADMIN — METOCLOPRAMIDE HYDROCHLORIDE 5 MG: 5 INJECTION INTRAMUSCULAR; INTRAVENOUS at 21:55

## 2022-02-07 RX ADMIN — ATORVASTATIN CALCIUM 20 MG: 10 TABLET, FILM COATED ORAL at 21:55

## 2022-02-07 RX ADMIN — ALBUTEROL SULFATE 2.5 MG: 2.5 SOLUTION RESPIRATORY (INHALATION) at 20:28

## 2022-02-07 RX ADMIN — GUAIFENESIN 400 MG: 100 SOLUTION ORAL at 15:34

## 2022-02-07 RX ADMIN — DOCUSATE SODIUM 50 MG AND SENNOSIDES 8.6 MG 1 TABLET: 8.6; 5 TABLET, FILM COATED ORAL at 09:41

## 2022-02-07 RX ADMIN — OLANZAPINE 5 MG: 5 TABLET, ORALLY DISINTEGRATING ORAL at 21:55

## 2022-02-07 RX ADMIN — DEXMEDETOMIDINE HYDROCHLORIDE 1.4 MCG/KG/HR: 4 INJECTION, SOLUTION INTRAVENOUS at 22:53

## 2022-02-07 RX ADMIN — OLANZAPINE 5 MG: 5 TABLET, ORALLY DISINTEGRATING ORAL at 09:41

## 2022-02-07 RX ADMIN — TERAZOSIN HYDROCHLORIDE 1 MG: 1 CAPSULE ORAL at 21:55

## 2022-02-07 RX ADMIN — METOCLOPRAMIDE HYDROCHLORIDE 5 MG: 5 INJECTION INTRAMUSCULAR; INTRAVENOUS at 15:35

## 2022-02-07 RX ADMIN — METOCLOPRAMIDE HYDROCHLORIDE 5 MG: 5 INJECTION INTRAMUSCULAR; INTRAVENOUS at 09:40

## 2022-02-07 RX ADMIN — METOPROLOL TARTRATE 5 MG: 5 INJECTION, SOLUTION INTRAVENOUS at 21:55

## 2022-02-07 RX ADMIN — SODIUM CHLORIDE, PRESERVATIVE FREE 10 ML: 5 INJECTION INTRAVENOUS at 09:40

## 2022-02-07 RX ADMIN — DEXMEDETOMIDINE HYDROCHLORIDE 1.3 MCG/KG/HR: 4 INJECTION, SOLUTION INTRAVENOUS at 15:45

## 2022-02-07 RX ADMIN — ENOXAPARIN SODIUM 90 MG: 100 INJECTION SUBCUTANEOUS at 12:54

## 2022-02-07 RX ADMIN — TRANEXAMIC ACID 100 MG: 100 INJECTION, SOLUTION INTRAVENOUS at 20:51

## 2022-02-07 RX ADMIN — POTASSIUM PHOSPHATE, MONOBASIC POTASSIUM PHOSPHATE, DIBASIC: 224; 236 INJECTION, SOLUTION, CONCENTRATE INTRAVENOUS at 20:02

## 2022-02-07 RX ADMIN — BUDESONIDE AND FORMOTEROL FUMARATE DIHYDRATE 2 PUFF: 160; 4.5 AEROSOL RESPIRATORY (INHALATION) at 06:45

## 2022-02-07 RX ADMIN — DEXMEDETOMIDINE HYDROCHLORIDE 1.3 MCG/KG/HR: 4 INJECTION, SOLUTION INTRAVENOUS at 04:33

## 2022-02-07 RX ADMIN — ALBUTEROL SULFATE 2.5 MG: 2.5 SOLUTION RESPIRATORY (INHALATION) at 06:45

## 2022-02-07 RX ADMIN — DEXMEDETOMIDINE HYDROCHLORIDE 1.4 MCG/KG/HR: 4 INJECTION, SOLUTION INTRAVENOUS at 08:13

## 2022-02-07 RX ADMIN — ALBUTEROL SULFATE 2.5 MG: 2.5 SOLUTION RESPIRATORY (INHALATION) at 12:00

## 2022-02-07 RX ADMIN — AMIODARONE HYDROCHLORIDE 200 MG: 200 TABLET ORAL at 09:41

## 2022-02-07 RX ADMIN — AMIODARONE HYDROCHLORIDE 200 MG: 200 TABLET ORAL at 01:59

## 2022-02-07 RX ADMIN — DOCUSATE SODIUM 50 MG AND SENNOSIDES 8.6 MG 1 TABLET: 8.6; 5 TABLET, FILM COATED ORAL at 21:55

## 2022-02-07 RX ADMIN — DIPHENHYDRAMINE HYDROCHLORIDE 12.5 MG: 12.5 SOLUTION ORAL at 18:20

## 2022-02-07 RX ADMIN — POLYETHYLENE GLYCOL 3350 17 G: 17 POWDER, FOR SOLUTION ORAL at 09:40

## 2022-02-07 RX ADMIN — METOPROLOL TARTRATE 5 MG: 5 INJECTION, SOLUTION INTRAVENOUS at 01:59

## 2022-02-07 RX ADMIN — DEXMEDETOMIDINE HYDROCHLORIDE 1.3 MCG/KG/HR: 4 INJECTION, SOLUTION INTRAVENOUS at 12:00

## 2022-02-07 NOTE — PROGRESS NOTES
PULMONARY AND CRITICAL CARE PROGRESS NOTE - [facility]    Patient: Eddy Ferro  1970   MR# [id number]   Acct# [id number]  02/07/22   08:01 CST  Referring Provider: Lane Cowan DO    Chief Complaint: Acute respiratory failure.  Mechanical ventilation.  COVID-19 pneumonia, tracheostomy and PEG placement done.    Interval history: The patient remains intubated and sedated.  Is currently tracheostomy to mechanical ventilator.  Propofol and Precedex infusing.  TPN infusing. The patient does open eyes to voice.  O2 sat 100% on 5 PEEP, 0.30 FiO2.  Respiratory rate 32, heart rate 84.  Nursing reports no overnight events.  Recommend to start weaning off sedation.     Meds:  albuterol, 2.5 mg, Nebulization, Q6H - RT  amiodarone, 200 mg, Oral, Q8H   Followed by  [START ON 2/11/2022] amiodarone, 200 mg, Oral, Q12H   Followed by  [START ON 2/25/2022] amiodarone, 200 mg, Oral, Daily  atorvastatin, 20 mg, Per PEG Tube, Nightly  budesonide-formoterol, 2 puff, Inhalation, BID - RT  diphenhydrAMINE, 12.5 mg, Oral, Q6H  enoxaparin, 1 mg/kg, Subcutaneous, Q12H  guaiFENesin, 400 mg, Per PEG Tube, Q8H  metoclopramide, 5 mg, Intravenous, TID  metoprolol tartrate, 5 mg, Intravenous, Q6H  OLANZapine zydis, 5 mg, Oral, BID  oxyCODONE, 5 mg, Per PEG Tube, Q6H  pantoprazole, 40 mg, Intravenous, Q12H  polyethylene glycol, 17 g, Per PEG Tube, Daily  senna-docusate sodium, 1 tablet, Per PEG Tube, BID  terazosin, 1 mg, Per PEG Tube, Nightly      Adult Custom Central TPN, , Last Rate: 80 mL/hr at 02/06/22 1728  Adult Custom Central TPN, , Last Rate: 80 mL/hr at 02/04/22 1706  dexmedetomidine, 0.2-1.5 mcg/kg/hr, Last Rate: 1.3 mcg/kg/hr (02/07/22 0433)  propofol, 5-75 mcg/kg/min, Last Rate: 25 mcg/kg/min (02/07/22 0718)      Review of Systems:   Review of Systems   Unable to perform ROS: Intubated     Physical Exam:  SpO2 Percentage    02/07/22 0500 02/07/22 0600 02/07/22 0645   SpO2: 100% 100% 100%     Body mass index  is 29.26 kg/m².   Temp:  [97.4 °F (36.3 °C)-99 °F (37.2 °C)] 98.4 °F (36.9 °C)  Heart Rate:  [] 102  Resp:  [27-35] 27  BP: ()/() 99/63  FiO2 (%):  [30 %] 30 %    Intake/Output Summary (Last 24 hours) at 2/7/2022 0801  Last data filed at 2/7/2022 0400  Gross per 24 hour   Intake 3000 ml   Output 1525 ml   Net 1475 ml     Physical Exam  Constitutional:       General: He is not in acute distress.     Appearance: He is ill-appearing. He is not diaphoretic.      Interventions: He is sedated.   HENT:      Head: Normocephalic.      Nose: Nose normal.      Mouth/Throat:      Mouth: Mucous membranes are moist.   Eyes:      General: No scleral icterus.  Neck:      Comments: Vent to trach  Cardiovascular:      Rate and Rhythm: Normal rate.   Pulmonary:      Effort: Pulmonary effort is normal. No respiratory distress.      Breath sounds: Rhonchi present. No wheezing.   Abdominal:      General: There is no distension.      Comments: PEG   Musculoskeletal:      Right lower leg: No edema.      Left lower leg: No edema.   Skin:     Coloration: Skin is not pale.   Neurological:      Comments: Sedated     Electronically signed by SHAHNAZ Bautista, 2/7/2022, 08:13 CST      Physician Substantive Portion: Medical Decision Making:    Laboratory Data:  Results from last 7 days   Lab Units 02/06/22  0629 02/05/22  0300 02/03/22  0239   WBC 10*3/mm3 8.29 7.63 8.92   HEMOGLOBIN g/dL 10.9* 10.6* 10.3*   PLATELETS 10*3/mm3 182 214 217     Results from last 7 days   Lab Units 02/06/22  0629 02/05/22  0300 02/03/22  0239   SODIUM mmol/L 140 138 137   POTASSIUM mmol/L 4.0 3.7 4.5   BUN mg/dL 15 16 21*   CREATININE mg/dL 0.36* 0.34* 0.42*     Results from last 7 days   Lab Units 02/07/22  0325 02/06/22  0309 02/05/22  0329   PH, ARTERIAL pH units 7.471* 7.429 7.405   PCO2, ARTERIAL mm Hg 36.2 40.4 43.3   PO2 ART mm Hg 107.0 102.0 101.0   FIO2 % 30 30 30     No results found for: BLOODCX, URINECX, WOUNDCX, MRSACX,  RESPCX, STOOLCX  Recent films:  No radiology results for the last day  Films reviewed personally by me.       My radiograph interpretation/independent review of imaging: Reviewed and agree with current interpretation.    Pulmonary Assessment:    1. Acute respiratory failure due to COVID-19   2. On mechanical ventilation failed weaning trials  3. S/p tracheostomy placement done today.  4. SARS-COV2 viral pneumonia  5. Status post completion of remdesivir and baricitinib  6. Secondary bacterial infection with prior history of baricitinib therapy  7. Pulmonary embolism stable  8. Ventilator dyssynchrony improved  9. S/p PEG tube placement today.  10. Atrial fibrillation with rapid ventricle response    Recommend/plan:   · Patient is stable on the assist control pressure control ventilation.  · He is off sedation.  His diastolic blood pressure is high but he is otherwise more alert and following commands  · We will try pressure support ventilation today and if possible will switch to tracheostomy collar his oxygen saturation stays at 100%  · Continue routine respiratory care and bronchodilator treatment.  · ENT is managing his tracheostomy.  · Patient started back on trickle tube feed but still has increased residuals and TPN will be continued.  · Dexamethasone completed.  Continue adjunct treatment for COVID-19.  · He is off Covid isolation now.  Encephalopathy and mental status improved.  · He is on multiple anxiety medications and started on Zyprexa.  · Pain anxiety control and sedation was addressed with RN.  · Repeat labs and imaging studies as needed.  · CODE STATUS: Full.  Overall prognosis: Guarded.  · He is waiting for LTAC placement done in the future.  · We will continue following him and make further recommendations.      This visit was performed by both a physician and an [facility] RN.  I personally evaluated and examined the patient.  I performed all aspects of the medical decision making as  documented.    Electronically signed by     Florence Ricks MD,  Pulmonologist/intensivist   2/7/2022, 10:21 CST

## 2022-02-07 NOTE — PLAN OF CARE
Goal Outcome Evaluation:  Plan of Care Reviewed With: other (see comments)        Progress: no change  Outcome Summary: Ntn follow up. Pt was started on trickle feeds via PEG tube of 10mL/hour yesterday. He cont to have elevated residuals even at such a low rate. He was having elevated residuals even with no TF running as well. He cont to receive TPN for majority of nutrition. He is being weaned off sedation at present. Unsure if he will stay off propofol or not, last known rate was providing 359 calories, which is much less than last review. Recommend to try changing formula from Peptamen Intense VHP to Peptamen AF. Will cont to run at 10mL/hour per MD discretion. Will cont with 20mL/hour water flush. Will likely need to try nasoduodenum tube placement and eventual jejunostomy tube placement to see if that will help. Cont to follow.

## 2022-02-07 NOTE — SIGNIFICANT NOTE
02/07/22 0747   Readings   Plateau Pressure (cm H2O) 23 cm H2O   Static Compliance (L/cm H2O) 29   Dynamic Compliance (L/cm H2O) 47 L/cm H2O

## 2022-02-07 NOTE — CASE MANAGEMENT/SOCIAL WORK
Continued Stay Note   [place]     Patient Name: Eddy Ferro  MRN: [id number]  Today's Date: 2/7/2022    Admit Date: 1/11/2022     Discharge Plan     Row Name 02/07/22 1023       Plan    Plan [facility] - [facility]    Plan Comments Spoke with Marquita with [facility] [phone].  [facility] does not currently have a high acuity bed available for patient given the sedation drips he is on.  Should a high acuity bed becomes available [facility] will advise.  If drips can be weaned they would have a lower acuity bed available.               Discharge Codes    No documentation.                     MAURY Das

## 2022-02-07 NOTE — CASE MANAGEMENT/SOCIAL WORK
Continued Stay Note  [facility]     Patient Name: Eddy Ferro  MRN: [id number]  Today's Date: 2/7/2022    Admit Date: 1/11/2022     Discharge Plan     Row Name 02/07/22 1505       Plan    Plan [facility]    Patient/Family in Agreement with Plan yes    Plan Comments Patient has been accepted to [facility].  Patient is going to [address] [facility], 3rd floor, [place], [place].  SW has contacted [facility] to advise of transport and ambulance captain has communicated with CCU nursing.  Family aware of transfer.    Patient is going to room number 3372.  Accepting MD is Dr. Paul and attending physician has been informed of [facility]'s request for a doctor to doctor consultation.  Dr. Paul can be reached at [phone].  Nursing to call report to [phone].    Marquita with [facility] can be reached at [phone].    Nursing to call [facility] [phone] when they are ready for .               Discharge Codes    No documentation.               Expected Discharge Date and Time     Expected Discharge Date Expected Discharge Time    Feb 7, 2022             MAURY Das

## 2022-02-07 NOTE — DISCHARGE SUMMARY
ADDENDUM:  Patient was initially set to be discharged as below on 2/7/2022 to [facility].  However prior to calling for transport patient started to have some bright red blood noted in his tracheostomy tube and around the outside of the tracheostomy tube that was brisk for a few seconds and then suddenly stopped.  He was hemodynamically stable at the time.  No vent changes.  Oxygenating well with sats near 100 on 5 of PEEP and 30% FiO2.  Regardless though due to this transport/discharge was canceled to err on the side of caution.  ENT came and did a bedside in the evening of 2/7 and did a scope with found no evidence of active bleeding. He then subsequently had a CTA of the neck and chest that showed no fistula formation.  Interestingly enough it also showed no PE.  He then subsequently had a trach exchange done in full on 2/8 with another look from neck to sternum again with no issues found.  His hemoglobins have been stable in the mid tens throughout this process with no drop.  Has had no further signs of bleeding.  Potentially could have been a thyroid vessel that was near the tube and briefly blood.  Felt safe at this point in time from ENT standpoint for discharge to LTAC as planned prior.  Of note due to his negative CTA we did obtain lower extremity Dopplers hoping that if negative we could just do DVT prophylaxis Lovenox but unfortunately he had bilateral lower extremity DVTs so he was restarted on full dose Lovenox on 2/8.  Again as noted he has not had any recurrence of bleeding after resuming Lovenox either.  Only other thing to note from the DC summary below is that patient has also had some increased tube feed residuals again and due to this TPN was restarted.  A KUB was done which showed normal/nonspecific bowel gas pattern.  Patient is having BMs.  No signs of obstruction or ileus.  On exam today patient is awake and interactive on Precedex.  Denies any pain.  Lungs are clear.  Abdomen is  soft.  No signs of blood in tracheostomy tube or around tube.  PEG tube without drainage.      Again plan to DC to [facility] today.  Will link new imaging and procedure notes directly below this symptoms prior to my signature.    Procedure Component Value Units Date/Time   US Venous Doppler Lower Extremity Bilateral (duplex) [[id number]] Ari as Reviewed   Order Status: Completed Collected: 02/08/22 1633    Updated: 02/08/22 1637   Narrative:     History: Swelling       Impression:     Impression: There is evidence of deep venous thrombosis in both lower   extremities.       Comments: Bilateral lower extremity venous duplex exam was performed   using color Doppler flow, Doppler waveform analysis, and grayscale   imaging, with and without compression. There is evidence of deep venous   thrombosis in the peroneal and gastrocnemius veins in the right lower   extremity. There is also evidence of deep venous thrombosis in the   gastrocnemius vein in the left lower extremity. No thrombus is   identified in the saphenofemoral junctions and greater saphenous veins   bilaterally.           This report was finalized on 02/08/2022 16:34 by Dr. Deni Paz MD.   XR Abdomen KUB [[id number]] Ari as Reviewed   Order Status: Completed Collected: 02/08/22 1341    Updated: 02/08/22 1354   Narrative:     EXAMINATION: XR ABDOMEN KUB- 2/8/2022 1:41 PM CST       HISTORY: high tube feed residuals, sluggish bowel sounds; A41.9-Sepsis,   unspecified organism; R65.21-Severe sepsis with septic shock;   Z99.11-Dependence on respirator (ventilator) status; U07.1-COVID-19;   J80-Acute respiratory distress syndrome; U07.1-COVID-19;   J12.82-Pneumonia due to coronavirus disease 2019; I26.99-Other pulmonary   embolism without acute cor pulmonale; E43-Unspecified severe   protein-calori.       REPORT: 2 supine views of the abdomen were obtained.       COMPARISON: KUB 2/2/2022.       The percutaneous gastric tube is present and appears to be  in   satisfactory position over the body of the stomach. The bowel gas   pattern is within normal limits. No free air is seen on these limited   supine images. The osseous structures are unremarkable. No urinary tract   calculi are identified.       Impression:     The bowel gas pattern is within normal limits. There is a   percutaneous gastric tube which appears to be in satisfactory position,   projecting over the gastric body. Contrast ingestion would be required   to confirm position of the tube tip within the lumen.   This report was finalized on 02/08/2022 13:51 by Dr. Chad Kirby MD.   CT Angiogram Chest [[id number]] Ari as Reviewed   Order Status: Completed Collected: 02/07/22 2235    Updated: 02/07/22 2241   Narrative:     CT ANGIOGRAM CHEST- 2/7/2022 9:02 PM CST       HISTORY: Acute bleed through tracheostomy.  Assess for traneoinnominate   fistula; A41.9-Sepsis, unspecified organism; R65.21-Severe sepsis with   septic shock; Z99.11-Dependence on respirator (ventilator) status;   U07.1-COVID-19; J80-Acute respiratory distress syndrome; U07.1-COVID-19;   J12.82-Pneumonia due to coronavirus disease 2019; I26.99-Other pulmonary   embolism without acute cor pulmonale; A44-Zthyjctonu       COMPARISON: None.       DOSE LENGTH PRODUCT: 526 mGy cm. Automated exposure control was also   utilized to decrease patient radiation dose.       TECHNIQUE: Helical tomographic images of the chest were obtained after   the administration of intravenous contrast following angiogram protocol.   Additionally, 3D and multiplanar reformatted images were provided.         FINDINGS:     Pulmonary arteries: There is adequate enhancement of the pulmonary   arteries to evaluate for central and segmental pulmonary emboli. There   are no filling defects within the main, lobar, segmental or visualized   subsegmental pulmonary arteries. The pulmonary arteries are within   normal limits for size.       Aorta and great vessels: The aorta  is well opacified and demonstrates no   dissection or aneurysm. The great vessels are normal in appearance.       Visualized neck base: The imaged portion of the base of the neck appears   unremarkable. Tracheostomy tube is present.       Lungs: Bilateral groundglass pulmonary infiltrates are present. This is   consistent with Covid 19 pneumonia. Small moderate right posterior   pleural effusion is present.. Tracheostomy tube is noted..       Heart: The heart is normal in size. There is no pericardial effusion.       Mediastinum and lymph nodes: No enlarged mediastinal, hilar, or axillary   lymph nodes are present.       Skeletal and soft tissues: The osseous structures of the thorax and   surrounding soft tissues demonstrate no acute process.       Upper abdomen: The imaged portion of the upper abdomen demonstrates no   acute process.       Impression:     1. Bilateral groundglass infiltrates consistent with pneumonia.   2. Tracheostomy tube is satisfactorily position. There is no evidence of   a vascular fistula..           This report was finalized on 02/07/2022 22:38 by Dr. Jerardo Broderick MD.   CT Angiogram Neck [[id number]] Ari as Reviewed   Order Status: Completed Collected: 02/07/22 2231    Updated: 02/07/22 2238   Narrative:     EXAMINATION:   CT ANGIOGRAM NECK-  2/7/2022 10:31 PM CST       HISTORY: CT ANGIOGRAM OF THE NECK 2/7/2022 9:02 PM CST       HISTORY: Acute bleed through tracheostomy.  Assess for traneoinnominate   fistula; A41.9-Sepsis, unspecified organism; R65.21-Severe sepsis with   septic shock; Z99.11-Dependence on respirator (ventilator) status;   U07.1-COVID-19; J80-Acute respiratory distress syndrome; U07.1-COVID-19;   J12.82-Pneumonia due to coronavirus disease 2019; I26.99-Other pulmonary   embolism without acute cor pulmonale; R28-Vqsqsjckom       COMPARISON: None       DLP: 360 mGy cm       In order to have a CT radiation dose as low as reasonably achievable,   Automated Exposure Control  was utilized for adjustment of the mA and/or   KV according to patient size.       TECHNIQUE: Following the uneventful administration of Isovue contrast,   serial helical tomographic images of the neck were obtained following   angiogram protocol. Multiplanar MIP and 3D reconstructions were provided   for review.         FINDINGS:       Angiogram:   The aortic arch and visualized great vessels are patent, without   evidence of aneurysm, dissection, vessel cutoff, or flow limiting   stenosis.       The common carotid, internal carotid and external carotid arteries are   patent bilaterally, without evidence of aneurysm, dissection, vessel   cutoff, or flow-limiting stenosis.       Posteriorly, the vertebral arteries and visualized basilar artery   demonstrate no aneurysm, dissection, vessel cutoff, or flow-limiting   stenosis.       Other findings: Tracheostomy tube is present. There is no evidence of a   vascular communication with a tracheostomy tube.. There is no mass or   significant lymphadenopathy. The lung apices are clear. No acute osseous   abnormality is noted. Fluid is present in the sphenoid air cells..       Impression:     Impression:   1. NASCET criteria were utilized to evaluate stenoses.   2. Unremarkable CT angiogram of the neck.   3. There is no evidence of a vascular fistula with a tracheostomy tube.   4. Fluid in the sphenoid sinuses       This report was finalized on 02/07/2022 22:35 by Dr. Jerardo Broderick MD.   XR Chest 1 View [[id number]] Ari as Reviewed   Order Status: Completed Collected: 02/07/22 1013    Updated: 02/07/22 1018   Narrative:     Frontal upright radiograph of the chest 2/7/2022 9:33 AM CST       HISTORY: Mechanical ventilation       COMPARISON: Chest exam dated 1/25/2022.       FINDINGS:       Tracheostomy is in good position. Known pulmonary infiltrates are much   improved, with only mild residual opacities in the extreme lung bases.   Lungs are well expanded. No pleural  effusion. Cardiomediastinal   silhouette and pulmonary vasculature are unremarkable. Left subclavian   central line is in good position. Enteric tube was discontinued.       Impression:     1. Tracheostomy is in good position. Lungs are well expanded.   2. Underlying pulmonary infiltrates are much improved, with minimal   residual infiltrate in the extreme lung bases.       This report was finalized on 02/07/2022 10:15 by Dr Kory Burks, .       [facility] Otolaryngology Head and Neck Surgery  PROCEDURE NOTE     Anesthesia: none     Indications for Procedure:     Pneumonia due to COVID-19 virus    Acute hypoxemic respiratory failure (HCC)    Acute respiratory distress syndrome (ARDS) due to 2019 novel coronavirus (HCC)    Obesity (BMI 30-39.9)    Acute subsegmental pulmonary embolism without acute cor pulmonale (HCC)    Ventilator dependent (HCC)    Hx of tracheostomy    Severe malnutrition (CMS/HCC)    Cytokine release syndrome, grade 4    Hemoptysis    Surgical complication involving respiratory system        Endoscopy Type: Flexible Tracheobronchoscopy     Indications for Procedure:   Tracheostomy evaluation  Trachea evaluation     Procedure Details:    The patient was placed in the sitting position.  The SCOPE TYPE: Flexible laryngoscope was passed both via the tracheostome and the tracheostomy tube .  The trachea from the subglottis to the rell was examined.  A retrograde examination of the Vocal cords and subglottis was carried out during respiration and phonation.  The following findings were noted:     Findings:   TRACHEO-BRONCHOSCOPY:    Stoma: Old Surgicel present, removed, necrotic debris, no active bleeding, some old blood clot present   Distal Trachea: Intact, no bleeding, no evidence of tracheoinnominate fistula, rell sharp, MSB patent, no bloody secretions   Proximal trachea: Intact, some secretions, no evidence of bleeding   True Vocal cords: Mobile from below, secretions  penetrating   Subglottis: Secretions present, no subglottic stenosis      Condition:  Stable.  Patient tolerated procedure well.     Complications:  None     Procedure:  Tracheostomy tube change     Procedure Details:    The patient's respiratory status was assessed.  The trach tube in position was assessed.  The patient was positioned.  The trach tube was removed without difficulty. The stoma and trachea were inspected.    A new tracheostomy tube was re- inserted. Trach tube position was confirmed with scope. Trach tube type:  Shiley trach tube Size  8 Cuffed DIC, flexible     Findings: See above     Condition:  Stable.  Patient tolerated procedure well.     Complications:  None     Post-procedure instructions reviewed with Nursing staff      Electronically signed by Liam Bermgan Jr, MD, 02/08/22, 1:23 PM CST.      Discharge date: 2/9/22  Condition on discharge: stable on 5 of peep and 30% fi02 vent via trach  Disposition: [facility] in [place]    Electronically signed by Lane Cowan DO, 02/09/22, 8:12 AM CST.              [facility] Medicine Services  DISCHARGE SUMMARY       Date of Admission: 1/11/2022  Date of Discharge:  2/7/2022  Primary Care Physician: Provider, No Known    Presenting Problem/History of Present Illness:  Acute hypoxemic respiratory failure (HCC) [J96.01]     Final Discharge Diagnoses:  Active Hospital Problems    Diagnosis    • **Pneumonia due to COVID-19 virus    • Cytokine release syndrome, grade 4    • Severe malnutrition (CMS/HCC)    • Hx of tracheostomy    • Ventilator dependent (HCC)    • Acute hypoxemic respiratory failure (HCC)    • Acute respiratory distress syndrome (ARDS) due to 2019 novel coronavirus (HCC)    • Obesity (BMI 30-39.9)    • Acute subsegmental pulmonary embolism without acute cor pulmonale (HCC)        Consults:   #1 Dr. Ricks, pulmonology  #2 Dr. Ortega, ID  #3 Dr. Bergman, ENT  #4 Dr. Wilkinson, GI  #5 Dr. Barillas,  Cardiology    Procedures Performed:   #1 R IJ TLC on 1/11   #2 tracheostomy on 1/25  #3 PEG on 1/27    Pertinent Test Results:   XR Chest 1 View [[id number]] Ari as Reviewed   Order Status: Completed Collected: 02/07/22 1013    Updated: 02/07/22 1018   Narrative:     Frontal upright radiograph of the chest 2/7/2022 9:33 AM CST       HISTORY: Mechanical ventilation       COMPARISON: Chest exam dated 1/25/2022.       FINDINGS:       Tracheostomy is in good position. Known pulmonary infiltrates are much   improved, with only mild residual opacities in the extreme lung bases.   Lungs are well expanded. No pleural effusion. Cardiomediastinal   silhouette and pulmonary vasculature are unremarkable. Left subclavian   central line is in good position. Enteric tube was discontinued.       Impression:     1. Tracheostomy is in good position. Lungs are well expanded.   2. Underlying pulmonary infiltrates are much improved, with minimal   residual infiltrate in the extreme lung bases.       This report was finalized on 02/07/2022 10:15 by Dr Kory Burks, .   XR Abdomen KUB [[id number]] Ari as Reviewed   Order Status: Completed Collected: 02/02/22 2102    Updated: 02/02/22 2107   Narrative:     EXAMINATION:  XR ABDOMEN KUB-  2/2/2022 9:01 PM CST       HISTORY: Gastric retention. E43-Unspecified severe protein-calorie   malnutrition.       COMPARISON: 1/29/2022.       TECHNIQUE: Supine abdomen       FINDINGS:   The stomach is not distended. There is air predominantly in   the right colon. No small bowel distention is seen. There is a tube   overlying the stomach that may be a gastric tube. There is a Moss   catheter overlying the lower pelvis.       Impression:     No acute findings.           This report was finalized on 02/02/2022 21:04 by Dr. Andreas Dominguez MD.   XR Abdomen KUB [[id number]] Ari as Reviewed   Order Status: Completed Collected: 01/29/22 0724    Updated: 01/29/22 0729   Narrative:     EXAM: XR ABDOMEN KUB-        INDICATION: High residuals, sepsis       COMPARISON: 1/23/2022       Impression:     FINDING/IMPRESSION:       Nonobstructive bowel gas pattern. No mass effect or suspicious   calcifications. Partially imaged Moss catheter device. Percutaneous   gastrostomy tip projects over the mid stomach. No acute osseous finding.   This report was finalized on 01/29/2022 07:26 by Dr. Finesse Harley MD.   XR Chest 1 View [[id number]] Ari as Reviewed   Order Status: Completed Collected: 01/25/22 2212    Updated: 01/25/22 2217   Narrative:     Frontal upright radiograph of the chest 1/25/2022 9:44 PM CST       HISTORY: Tracheostomy       COMPARISON: 1/20/2022.       FINDINGS:       New tracheostomy in place. Reidentified bilateral pulmonary infiltrates,   patchy and fairly extensive throughout both lungs. Bibasilar   atelectasis. No pleural effusion or pneumothorax. Enteric tube courses   below the diaphragm with tip beyond the field of view.       Impression:     1. New tracheostomy in place.   2. Bilateral pulmonary infiltrates are very similar.   3. New bibasilar atelectasis.       This report was finalized on 01/25/2022 22:14 by Dr Kory Burks, .   XR Abdomen KUB [[id number]] Ari as Reviewed   Order Status: Completed Collected: 01/23/22 1033    Updated: 01/23/22 1037   Narrative:     HISTORY: High tube feed residuals       KUB: Frontal view the abdomen obtained       COMPARISON: 19 2022       FINDINGS: Enteric tube within the body of the stomach. Nonspecific   nonobstructive bowel gas pattern. No pneumatosis or portal venous air.   No dilated loops of bowel identified. Patchy basilar densities related   to Covid pneumonia.       Impression:     1. Enteric tube tip in the body the stomach. Nonspecific nonobstructive   bowel gas pattern.   This report was finalized on 01/23/2022 10:34 by Dr. Marquita Mac MD.   XR Chest 1 View [[id number]] Ari as Reviewed   Order Status: Completed Collected: 01/20/22 0701    Updated:  01/20/22 0705   Narrative:     Frontal supine radiograph of the chest 1/20/2022 3:05 AM CST       History: on vent, + COVID, f/u lung infiltrates; A41.9-Sepsis,   unspecified organism; R65.21-Severe sepsis with septic shock       Comparison: 1/16/2022       Findings:   Lines and tubes are stable in position. No new opacities or   pneumothoraces are visualized in the chest. The cardiomediastinal   silhouette and pulmonary vascularity are unchanged.         No acute osseous or soft tissue abnormality is noted.       Impression:     Impression:   1. No significant interval change since previous exam.           This report was finalized on 01/20/2022 07:02 by Dr. Codey Estevez MD.   XR Abdomen KUB [[id number]] Rai as Reviewed   Order Status: Completed Collected: 01/19/22 0906    Updated: 01/19/22 0910   Narrative:     XR ABDOMEN KUB- 1/19/2022 8:53 AM CST       HISTORY: high residual; A41.9-Sepsis, unspecified organism;   R65.21-Severe sepsis with septic shock         COMPARISON: January 16, 2022       FINDINGS:   There is a nonspecific bowel gas pattern. Nasogastric tube is   satisfactorily position in the body the stomach..       Lumbar vertebra normally aligned. SI joints are normal..       Impression:     1. Nonspecific bowel gas pattern. Nasogastric tube is satisfactorily   position.           This report was finalized on 01/19/2022 09:07 by Dr. Jerardo Broderick MD.       Results for orders placed during the hospital encounter of 01/11/22    Adult Transthoracic Echo Limited W/ Cont if Necessary Per Protocol    Interpretation Summary  · Left ventricular ejection fraction appears to be 56 - 60%.  · Abnormal global longitudinal LV strain (GLS) = -7%.  · Normal right ventricular cavity size, wall thickness, systolic function and septal motion noted.  · Estimated right ventricular systolic pressure from tricuspid regurgitation is normal (<35 mmHg).      Chief Complaint on Day of Discharge:   Follow-up PE/respiratory  failure/COVID    History of Present Illness on Day of Discharge:   Patient remains status post trach on ventilatory support with some sedation required. Patient has had improved mental status overall but still gets somewhat tachypneic and anxious with sedation weans. He has been on propofol and Precedex were transitioning to Precedex alone today. Afebrile overnight otherwise no acute issues noted. Unable to really participate in evaluation at this time or give any ROS.    Hospital Course:  The patient is a 51 y.o. male with a prolonged hospital stay. Interested reader can refer to progress notes and charting to feel in any potential Discharge Summary. He Has a history of GERD and hypertension. He presented to an outside hospital with progressive/worsening shortness of breath, fevers, chills, cough. He tested positive for COVID-19 on 12/21 per reports all this was later disputed and thought to be possibly in the early January before testing positive. Regardless he presented to outside hospital on 1/10 and was hypoxic on room air. He was found 3 L nasal cannula with improvement of sats to low nineties. A CTA was done which showed a left subsegmental PE and bilateral groundglass infiltrates. He continued to worsen over the next 24 hours was placed on BiPAP but then subsequently intubated. Post the patient was transferred to our facility on 1/11 in the late evening. At the outside hospital prior to transfer he had received remdesivir, azithromycin, ceftriaxone, Zosyn, baricitinib per reports. He was also started on therapeutic Lovenox. After arrival here antibiotics were continued and he was started on dexamethasone. He was also started on the supportive vitamins. Patient did eventually begin to wean from a respiratory standpoint ventilator requirement started to improve. However he was significantly anxious and tachypneic with any kind of sedation weans. Due to inability to get him off the ventilator he ended up  "requiring tracheostomy by Dr. Bergman on 1/25. He is also been having some issues with tube feed residuals and was started on Reglan without much improvement. Eventually GI proceeded forward with a PEG placement on 1/27. He is still having some residuals of 300 or so but much improved from prior and he was near 900. He did require TPN for some time. Overall doing better from nutrition standpoint. He is still on full dose Lovenox and this can be transitioned to oral anticoagulation when felt appropriate, has been referred here waiting for good GI absorption. Of note only other active issue is patient having runs of possibly SVT initially felt with sedation weans that quickly went back to sinus tachycardia. However he later had some EKG changes that appear different and more like atrial fibrillation. He was seen by cardiology and is currently on an amiodarone taper that started on Friday 2/4 with a plan for 3 times daily amiodarone for 20 doses and then twice daily for 14 days and then daily thereafter starting on 2/25. Again he is already anticoagulated. He is hemodynamically stable. Medically he has been doing fairly well with good blood counts and renal function/electrolytes. Main issue throughout hospital stay really has just been inability to wean from sedation ventilator. Plans are now to discharge the patient to an LTAC where he can complete ventilator/sedation weaning.    Condition on Discharge: Stable on ventilator with 5 of PEEP and 30% FiO2. Satting %    Physical Exam on Discharge:  BP (!) 151/102   Pulse 106   Temp 97.4 °F (36.3 °C)   Resp 25   Ht 177.8 cm (70\")   Wt 92.5 kg (203 lb 14.8 oz)   SpO2 99%   BMI 29.26 kg/m²   Physical Exam  GEN: intubated, sedated, NAD  HEENT:  Atraumatic, Anicteric, Trachea midline, + trach  Lungs: equal chest rise bilaterally, no vent desynchrony   Heart: Increased rate, regular rhythm  ABD: Soft, + PEG, no drainage  Extremities: no cyanosis, no edema  Skin: no " obvious rashes or petechiae  Neuro: Unable to assess at this time due to sedation  Psych: Unable to assess at this time due to sedation       Discharge Disposition:  Long Term Care (DC - External)    Discharge Medications:     Discharge Medications      New Medications      Instructions Start Date   acetaminophen 160 MG/5ML solution  Commonly known as: TYLENOL   650 mg, Per PEG Tube, Every 6 Hours PRN      albuterol (2.5 MG/3ML) 0.083% nebulizer solution  Commonly known as: PROVENTIL   2.5 mg, Nebulization, Every 6 Hours - RT      amiodarone 200 MG tablet  Commonly known as: PACERONE   200 mg, Oral, Every 8 Hours      amiodarone 200 MG tablet  Commonly known as: PACERONE   200 mg, Oral, Every 12 Hours   Start Date: February 11, 2022     amiodarone 200 MG tablet  Commonly known as: PACERONE   200 mg, Oral, Daily   Start Date: February 25, 2022     atorvastatin 20 MG tablet  Commonly known as: LIPITOR   20 mg, Oral, Nightly      bisacodyl 10 MG suppository  Commonly known as: DULCOLAX   10 mg, Rectal, Daily PRN      budesonide-formoterol 160-4.5 MCG/ACT inhaler  Commonly known as: SYMBICORT   2 puffs, Inhalation, 2 Times Daily - RT      dexmedetomidine 400 MCG/100ML solution infusion  Commonly known as: PRECEDEX   0.2-1.5 mcg/kg/hr (19..95 mcg/hr), Intravenous, Titrated      dextromethorphan polistirex ER 30 MG/5ML Suspension Extended Release oral suspension  Commonly known as: DELSYM   60 mg, Per PEG Tube, Every 12 Hours PRN      diphenhydrAMINE 12.5 MG/5ML elixir  Commonly known as: BENADRYL   12.5 mg, Oral, Every 6 Hours Scheduled      enoxaparin 100 MG/ML solution syringe  Commonly known as: LOVENOX   1 mg/kg (90 mg), Subcutaneous, Every 12 Hours      guaiFENesin 100 MG/5ML solution oral solution  Commonly known as: ROBITUSSIN   400 mg, Per PEG Tube, Every 8 Hours Scheduled      hydrocortisone-bacitracin-zinc oxide-nystatin  Commonly known as: MAGIC BARRIER   1 application, Topical, As Needed      LORazepam  2 MG/ML injection  Commonly known as: ATIVAN   1 mg, Intravenous, Every 4 Hours PRN      metoclopramide 5 MG/ML injection  Commonly known as: REGLAN   5 mg, Intravenous, 3 Times Daily      metoprolol tartrate 5 MG/5ML injection  Commonly known as: LOPRESSOR   5 mg, Intravenous, Every 6 Hours      oxyCODONE 5 MG/5ML solution  Commonly known as: ROXICODONE   5 mg, Oral, Every 6 Hours      pantoprazole 40 MG injection  Commonly known as: PROTONIX   40 mg, Intravenous, Every 12 Hours Scheduled      polyethylene glycol 17 g packet  Commonly known as: MIRALAX   17 g, Oral, Daily      sennosides-docusate 8.6-50 MG per tablet  Commonly known as: PERICOLACE   1 tablet, Per PEG Tube, 2 Times Daily      terazosin 1 MG capsule  Commonly known as: HYTRIN   1 mg, Per G Tube, Nightly         Stop These Medications    hydroCHLOROthiazide 12.5 MG tablet  Commonly known as: HYDRODIURIL     lisinopril 40 MG tablet  Commonly known as: PRINIVIL,ZESTRIL     omeprazole 20 MG capsule  Commonly known as: priLOSEC     ROBITUSSIN HONEY COUGH PO            Discharge Diet:    Dietary Orders (From admission, onward)     Start     Ordered    02/07/22 1039  Peptamen AF (Vital AF 1.2)  Diet Effective Now        Comments: When TF bag to be changed, change formula to Peptamen AF, cont to only run at 10mL/hour. Cont with 20mL water flush every hour for tube patency.   Question Answer Comment   Tube Feeding Formula: Peptamen AF (Vital AF 1.2)    Tube Feeding Type Continuous    Continuous Tube Feeding Start Rate (mL/hr) 10    Then Advance Rate By (mL/hr) Do Not Advance    Every __ Hours Patient at Goal Rate    To Goal Rate of (mL/hr) 10    Total Daily Volume to Deliver (mL/day) 220    Water Flush Amount (mL) 20    Water Flush Frequency Every 1 Hour        02/07/22 1039    01/27/22 0001  NPO Diet  Diet Effective Midnight         01/26/22 1131                  Activity at Discharge:    As able    Discharge Care Plan/Instructions:   Discharging to LTAC,  f/u as per them when discharged    Follow-up Appointments:   No future appointments.    Test Results Pending at Discharge: none    Electronically signed by Lane Cowan DO, 02/07/22, 15:42 CST.    Time: 40 minutes

## 2022-02-08 ENCOUNTER — APPOINTMENT (OUTPATIENT)
Dept: ULTRASOUND IMAGING | Facility: HOSPITAL | Age: 52
End: 2022-02-08

## 2022-02-08 ENCOUNTER — APPOINTMENT (OUTPATIENT)
Dept: GENERAL RADIOLOGY | Facility: HOSPITAL | Age: 52
End: 2022-02-08

## 2022-02-08 PROBLEM — J98.9: Status: ACTIVE | Noted: 2022-02-08

## 2022-02-08 LAB
ANION GAP SERPL CALCULATED.3IONS-SCNC: 8 MMOL/L (ref 5–15)
ARTERIAL PATENCY WRIST A: POSITIVE
ATMOSPHERIC PRESS: 754 MMHG
BASE EXCESS BLDA CALC-SCNC: 1.8 MMOL/L (ref 0–2)
BDY SITE: ABNORMAL
BODY TEMPERATURE: 37 C
BUN SERPL-MCNC: 25 MG/DL (ref 6–20)
BUN/CREAT SERPL: 75.8 (ref 7–25)
CALCIUM SPEC-SCNC: 8.2 MG/DL (ref 8.6–10.5)
CHLORIDE SERPL-SCNC: 104 MMOL/L (ref 98–107)
CO2 SERPL-SCNC: 25 MMOL/L (ref 22–29)
CREAT SERPL-MCNC: 0.33 MG/DL (ref 0.76–1.27)
DEPRECATED RDW RBC AUTO: 58.4 FL (ref 37–54)
ERYTHROCYTE [DISTWIDTH] IN BLOOD BY AUTOMATED COUNT: 19.4 % (ref 12.3–15.4)
GFR SERPL CREATININE-BSD FRML MDRD: >150 ML/MIN/1.73
GLUCOSE SERPL-MCNC: 114 MG/DL (ref 65–99)
HCO3 BLDA-SCNC: 25.6 MMOL/L (ref 20–26)
HCT VFR BLD AUTO: 34 % (ref 37.5–51)
HCT VFR BLD AUTO: 35.7 % (ref 37.5–51)
HGB BLD-MCNC: 10.6 G/DL (ref 13–17.7)
HGB BLD-MCNC: 10.6 G/DL (ref 13–17.7)
INHALED O2 CONCENTRATION: 30 %
Lab: ABNORMAL
MCH RBC QN AUTO: 24.9 PG (ref 26.6–33)
MCHC RBC AUTO-ENTMCNC: 29.7 G/DL (ref 31.5–35.7)
MCV RBC AUTO: 84 FL (ref 79–97)
MODALITY: ABNORMAL
PCO2 BLDA: 36.4 MM HG (ref 35–45)
PCO2 TEMP ADJ BLD: 36.4 MM HG (ref 35–45)
PEEP RESPIRATORY: 5 CM[H2O]
PH BLDA: 7.46 PH UNITS (ref 7.35–7.45)
PH, TEMP CORRECTED: 7.46 PH UNITS (ref 7.35–7.45)
PLATELET # BLD AUTO: 235 10*3/MM3 (ref 140–450)
PMV BLD AUTO: 12.3 FL (ref 6–12)
PO2 BLDA: 101 MM HG (ref 83–108)
PO2 TEMP ADJ BLD: 101 MM HG (ref 83–108)
POTASSIUM SERPL-SCNC: 3.5 MMOL/L (ref 3.5–5.2)
PSV: 10 CMH2O
RBC # BLD AUTO: 4.25 10*6/MM3 (ref 4.14–5.8)
SAO2 % BLDCOA: 98.5 % (ref 94–99)
SODIUM SERPL-SCNC: 137 MMOL/L (ref 136–145)
VENTILATOR MODE: ABNORMAL
WBC NRBC COR # BLD: 9.68 10*3/MM3 (ref 3.4–10.8)

## 2022-02-08 PROCEDURE — 36600 WITHDRAWAL OF ARTERIAL BLOOD: CPT

## 2022-02-08 PROCEDURE — 0B21XFZ CHANGE TRACHEOSTOMY DEVICE IN TRACHEA, EXTERNAL APPROACH: ICD-10-PCS | Performed by: OTOLARYNGOLOGY

## 2022-02-08 PROCEDURE — 25010000002 MAGNESIUM SULFATE PER 500 MG OF MAGNESIUM: Performed by: INTERNAL MEDICINE

## 2022-02-08 PROCEDURE — 31615 TRCHEOBRNCHSC EST TRACHS INC: CPT | Performed by: OTOLARYNGOLOGY

## 2022-02-08 PROCEDURE — 99232 SBSQ HOSP IP/OBS MODERATE 35: CPT | Performed by: OTOLARYNGOLOGY

## 2022-02-08 PROCEDURE — 25010000002 METOCLOPRAMIDE PER 10 MG: Performed by: INTERNAL MEDICINE

## 2022-02-08 PROCEDURE — 85014 HEMATOCRIT: CPT | Performed by: INTERNAL MEDICINE

## 2022-02-08 PROCEDURE — 85027 COMPLETE CBC AUTOMATED: CPT | Performed by: INTERNAL MEDICINE

## 2022-02-08 PROCEDURE — 94799 UNLISTED PULMONARY SVC/PX: CPT

## 2022-02-08 PROCEDURE — 25010000002 ENOXAPARIN PER 10 MG: Performed by: INTERNAL MEDICINE

## 2022-02-08 PROCEDURE — 93970 EXTREMITY STUDY: CPT | Performed by: SURGERY

## 2022-02-08 PROCEDURE — 99233 SBSQ HOSP IP/OBS HIGH 50: CPT | Performed by: INTERNAL MEDICINE

## 2022-02-08 PROCEDURE — 25010000002 CALCIUM GLUCONATE PER 10 ML: Performed by: INTERNAL MEDICINE

## 2022-02-08 PROCEDURE — 82803 BLOOD GASES ANY COMBINATION: CPT

## 2022-02-08 PROCEDURE — 25010000002 POTASSIUM CHLORIDE PER 2 MEQ OF POTASSIUM: Performed by: INTERNAL MEDICINE

## 2022-02-08 PROCEDURE — 94003 VENT MGMT INPAT SUBQ DAY: CPT

## 2022-02-08 PROCEDURE — 93970 EXTREMITY STUDY: CPT

## 2022-02-08 PROCEDURE — 85018 HEMOGLOBIN: CPT | Performed by: INTERNAL MEDICINE

## 2022-02-08 PROCEDURE — 74018 RADEX ABDOMEN 1 VIEW: CPT

## 2022-02-08 PROCEDURE — 0BJ08ZZ INSPECTION OF TRACHEOBRONCHIAL TREE, VIA NATURAL OR ARTIFICIAL OPENING ENDOSCOPIC: ICD-10-PCS | Performed by: OTOLARYNGOLOGY

## 2022-02-08 PROCEDURE — 80048 BASIC METABOLIC PNL TOTAL CA: CPT | Performed by: INTERNAL MEDICINE

## 2022-02-08 PROCEDURE — 25010000002 LORAZEPAM PER 2 MG: Performed by: NURSE PRACTITIONER

## 2022-02-08 RX ADMIN — ATORVASTATIN CALCIUM 20 MG: 10 TABLET, FILM COATED ORAL at 21:26

## 2022-02-08 RX ADMIN — METOCLOPRAMIDE HYDROCHLORIDE 5 MG: 5 INJECTION INTRAMUSCULAR; INTRAVENOUS at 17:59

## 2022-02-08 RX ADMIN — AMIODARONE HYDROCHLORIDE 200 MG: 200 TABLET ORAL at 00:15

## 2022-02-08 RX ADMIN — OXYCODONE HYDROCHLORIDE 5 MG: 5 SOLUTION ORAL at 17:59

## 2022-02-08 RX ADMIN — DEXMEDETOMIDINE HYDROCHLORIDE 1.3 MCG/KG/HR: 4 INJECTION, SOLUTION INTRAVENOUS at 22:24

## 2022-02-08 RX ADMIN — OXYCODONE HYDROCHLORIDE 5 MG: 5 SOLUTION ORAL at 05:20

## 2022-02-08 RX ADMIN — DIPHENHYDRAMINE HYDROCHLORIDE 12.5 MG: 12.5 SOLUTION ORAL at 17:59

## 2022-02-08 RX ADMIN — ENOXAPARIN SODIUM 90 MG: 100 INJECTION SUBCUTANEOUS at 21:27

## 2022-02-08 RX ADMIN — OLANZAPINE 5 MG: 5 TABLET, ORALLY DISINTEGRATING ORAL at 09:18

## 2022-02-08 RX ADMIN — PANTOPRAZOLE SODIUM 40 MG: 40 INJECTION, POWDER, FOR SOLUTION INTRAVENOUS at 21:26

## 2022-02-08 RX ADMIN — METOPROLOL TARTRATE 5 MG: 5 INJECTION, SOLUTION INTRAVENOUS at 02:32

## 2022-02-08 RX ADMIN — BUDESONIDE AND FORMOTEROL FUMARATE DIHYDRATE 2 PUFF: 160; 4.5 AEROSOL RESPIRATORY (INHALATION) at 06:59

## 2022-02-08 RX ADMIN — DOCUSATE SODIUM 50 MG AND SENNOSIDES 8.6 MG 1 TABLET: 8.6; 5 TABLET, FILM COATED ORAL at 09:18

## 2022-02-08 RX ADMIN — GUAIFENESIN 400 MG: 100 SOLUTION ORAL at 14:53

## 2022-02-08 RX ADMIN — DEXMEDETOMIDINE HYDROCHLORIDE 1.3 MCG/KG/HR: 4 INJECTION, SOLUTION INTRAVENOUS at 08:05

## 2022-02-08 RX ADMIN — GUAIFENESIN 400 MG: 100 SOLUTION ORAL at 21:26

## 2022-02-08 RX ADMIN — DEXMEDETOMIDINE HYDROCHLORIDE 1.3 MCG/KG/HR: 4 INJECTION, SOLUTION INTRAVENOUS at 14:48

## 2022-02-08 RX ADMIN — DEXMEDETOMIDINE HYDROCHLORIDE 1.4 MCG/KG/HR: 4 INJECTION, SOLUTION INTRAVENOUS at 02:34

## 2022-02-08 RX ADMIN — DIPHENHYDRAMINE HYDROCHLORIDE 12.5 MG: 12.5 SOLUTION ORAL at 00:11

## 2022-02-08 RX ADMIN — GUAIFENESIN 400 MG: 100 SOLUTION ORAL at 05:20

## 2022-02-08 RX ADMIN — DIPHENHYDRAMINE HYDROCHLORIDE 12.5 MG: 12.5 SOLUTION ORAL at 11:44

## 2022-02-08 RX ADMIN — AMIODARONE HYDROCHLORIDE 200 MG: 200 TABLET ORAL at 18:00

## 2022-02-08 RX ADMIN — OXYCODONE HYDROCHLORIDE 5 MG: 5 SOLUTION ORAL at 00:11

## 2022-02-08 RX ADMIN — SODIUM CHLORIDE, PRESERVATIVE FREE 10 ML: 5 INJECTION INTRAVENOUS at 09:18

## 2022-02-08 RX ADMIN — OLANZAPINE 5 MG: 5 TABLET, ORALLY DISINTEGRATING ORAL at 21:26

## 2022-02-08 RX ADMIN — METOCLOPRAMIDE HYDROCHLORIDE 5 MG: 5 INJECTION INTRAMUSCULAR; INTRAVENOUS at 09:18

## 2022-02-08 RX ADMIN — DEXMEDETOMIDINE HYDROCHLORIDE 1.3 MCG/KG/HR: 4 INJECTION, SOLUTION INTRAVENOUS at 19:00

## 2022-02-08 RX ADMIN — METOPROLOL TARTRATE 5 MG: 5 INJECTION, SOLUTION INTRAVENOUS at 14:54

## 2022-02-08 RX ADMIN — PANTOPRAZOLE SODIUM 40 MG: 40 INJECTION, POWDER, FOR SOLUTION INTRAVENOUS at 09:18

## 2022-02-08 RX ADMIN — POLYETHYLENE GLYCOL 3350 17 G: 17 POWDER, FOR SOLUTION ORAL at 09:18

## 2022-02-08 RX ADMIN — ALBUTEROL SULFATE 2.5 MG: 2.5 SOLUTION RESPIRATORY (INHALATION) at 06:58

## 2022-02-08 RX ADMIN — METOPROLOL TARTRATE 5 MG: 5 INJECTION, SOLUTION INTRAVENOUS at 09:18

## 2022-02-08 RX ADMIN — DIPHENHYDRAMINE HYDROCHLORIDE 12.5 MG: 12.5 SOLUTION ORAL at 05:20

## 2022-02-08 RX ADMIN — METOCLOPRAMIDE HYDROCHLORIDE 5 MG: 5 INJECTION INTRAMUSCULAR; INTRAVENOUS at 21:27

## 2022-02-08 RX ADMIN — ALBUTEROL SULFATE 2.5 MG: 2.5 SOLUTION RESPIRATORY (INHALATION) at 23:17

## 2022-02-08 RX ADMIN — ALBUTEROL SULFATE 2.5 MG: 2.5 SOLUTION RESPIRATORY (INHALATION) at 12:01

## 2022-02-08 RX ADMIN — DEXMEDETOMIDINE HYDROCHLORIDE 1.3 MCG/KG/HR: 4 INJECTION, SOLUTION INTRAVENOUS at 11:44

## 2022-02-08 RX ADMIN — DOCUSATE SODIUM 50 MG AND SENNOSIDES 8.6 MG 1 TABLET: 8.6; 5 TABLET, FILM COATED ORAL at 21:26

## 2022-02-08 RX ADMIN — TERAZOSIN HYDROCHLORIDE 1 MG: 1 CAPSULE ORAL at 21:27

## 2022-02-08 RX ADMIN — LORAZEPAM 1 MG: 2 INJECTION INTRAMUSCULAR; INTRAVENOUS at 21:27

## 2022-02-08 RX ADMIN — ALBUTEROL SULFATE 2.5 MG: 2.5 SOLUTION RESPIRATORY (INHALATION) at 19:26

## 2022-02-08 RX ADMIN — OXYCODONE HYDROCHLORIDE 5 MG: 5 SOLUTION ORAL at 11:44

## 2022-02-08 RX ADMIN — METOPROLOL TARTRATE 5 MG: 5 INJECTION, SOLUTION INTRAVENOUS at 21:26

## 2022-02-08 RX ADMIN — BUDESONIDE AND FORMOTEROL FUMARATE DIHYDRATE 2 PUFF: 160; 4.5 AEROSOL RESPIRATORY (INHALATION) at 19:27

## 2022-02-08 RX ADMIN — DEXMEDETOMIDINE HYDROCHLORIDE 1.4 MCG/KG/HR: 4 INJECTION, SOLUTION INTRAVENOUS at 05:23

## 2022-02-08 RX ADMIN — AMIODARONE HYDROCHLORIDE 200 MG: 200 TABLET ORAL at 09:18

## 2022-02-08 RX ADMIN — POTASSIUM PHOSPHATE, MONOBASIC POTASSIUM PHOSPHATE, DIBASIC: 224; 236 INJECTION, SOLUTION, CONCENTRATE INTRAVENOUS at 18:03

## 2022-02-08 NOTE — PROGRESS NOTES
[facility] ([facility]) Progress Note:        Patient Care Team:  Provider, No Known as PCP - General    Active consulting complaint: Bleeding from tracheostomy tube    Subjective     Interval History:     Eddy Ferro is a  51 y.o. male who underwent tracheostomy on January 25, 2022 for complications following Covid and bilateral pulmonary emboli.  I was called emergently to the CCU with complaints of brisk bleeding that began as a slow ooze around the tracheostomy tube.  This progressed rapidly to bleeding through the tracheostomy tube, requiring replacement of the tubing, and brisk bleeding to the mouth.  There are 300 cc of blood within the suction canister that was fresh.  Additional bleeding was noted and was controlled with sponges.  Upon my arrival to the CCU, the bleeding had stopped spontaneously.  The team had initially called Dr. Bergman, who recommended starting TXA.  The TXA has not been received from pharmacy yet.    Discussing with the nursing staff, the patient has been awake and oriented, discussing with them the Olympics prior to his bleed.  He is scheduled for transfer to an LTAC in [place].      History taken from: Nursing staff and respiratory    Review of Systems:    Review of Systems   Unable to perform ROS: Intubated       Objective     Vital Signs  Temp:  [97.1 °F (36.2 °C)-99 °F (37.2 °C)] 97.8 °F (36.6 °C)  Heart Rate:  [] 108  Resp:  [25-35] 35  BP: ()/() 157/107  FiO2 (%):  [30 %] 30 %    Physical Exam:   Physical Exam  Constitutional:       Appearance: He is not toxic-appearing.   HENT:      Head: Normocephalic and atraumatic.      Right Ear: External ear normal.      Left Ear: External ear normal.      Nose: Nose normal.      Mouth/Throat:      Mouth: Mucous membranes are moist.      Pharynx: Oropharynx is clear.      Comments: No active bleeding, oropharyngeal secretions present.  Dried blood around his mouth.  Eyes:      Conjunctiva/sclera: Conjunctivae normal.       Pupils: Pupils are equal, round, and reactive to light.   Neck:      Comments: Tracheostomy tube in place.  There is mild blood around the fresh tracheostomy dressing  Cardiovascular:      Rate and Rhythm: Normal rate and regular rhythm.   Pulmonary:      Comments: On the ventilator, no active bleeding through the tracheostomy tube.  There is fresh blood, however, within the tubing itself.  Musculoskeletal:      Cervical back: Normal range of motion.   Skin:     General: Skin is warm and dry.   Neurological:      Mental Status: He is alert.       Tracheoscopy was performed through the tracheostomy tube, to reveal no granulation tissue at the distal end of the tracheostomy tube.  I attempted transoral and transnasal laryngoscopy, despite quite a bit of active secretions, I was able to visualize his cords and his cords were mobile bilaterally.  There is no active bleeding in the immediate subglottis.  Immediate subglottis     Results Review:       Lab Results (last 24 hours)     Procedure Component Value Units Date/Time    Hemoglobin & Hematocrit, Blood [[id number]] Collected: 02/07/22 1934    Specimen: Blood Updated: 02/07/22 1934    Triglycerides [[id number]]  (Abnormal) Collected: 02/07/22 0247    Specimen: Blood Updated: 02/07/22 0422     Triglycerides 181 mg/dL     Blood Gas, Arterial - [[id number]]  (Abnormal) Collected: 02/07/22 0325    Specimen: Arterial Blood Updated: 02/07/22 0330     Site Right Radial     Edwardo's Test Positive     pH, Arterial 7.471 pH units      Comment: 83 Value above reference range        pCO2, Arterial 36.2 mm Hg      pO2, Arterial 107.0 mm Hg      HCO3, Arterial 26.4 mmol/L      Comment: 83 Value above reference range        Base Excess, Arterial 2.8 mmol/L      Comment: 83 Value above reference range        O2 Saturation, Arterial 98.6 %      Temperature 37.0 C      Barometric Pressure for Blood Gas 756 mmHg      Modality Ventilator     FIO2 30 %      Ventilator Mode PC     Set [facility]  Resp Rate 14.0     PEEP 5.0     PIP 20 cmH2O      Comment: Meter: [id number]     :  [id number]        Collected by [id number]     pCO2, Temperature Corrected 36.2 mm Hg      pH, Temp Corrected 7.471 pH Units      pO2, Temperature Corrected 107 mm Hg         Imaging Results (Last 24 Hours)     Procedure Component Value Units Date/Time    XR Chest 1 View [[id number]] Collected: 02/07/22 1013     Updated: 02/07/22 1018    Narrative:      Frontal upright radiograph of the chest 2/7/2022 9:33 AM CST     HISTORY: Mechanical ventilation     COMPARISON: Chest exam dated 1/25/2022.     FINDINGS:      Tracheostomy is in good position. Known pulmonary infiltrates are much  improved, with only mild residual opacities in the extreme lung bases.  Lungs are well expanded. No pleural effusion. Cardiomediastinal  silhouette and pulmonary vasculature are unremarkable. Left subclavian  central line is in good position. Enteric tube was discontinued.       Impression:      1. Tracheostomy is in good position. Lungs are well expanded.  2. Underlying pulmonary infiltrates are much improved, with minimal  residual infiltrate in the extreme lung bases.     This report was finalized on 02/07/2022 10:15 by Dr Kory Burks, .          Medication Review:     Current Facility-Administered Medications   Medication Dose Route Frequency Provider Last Rate Last Admin   • acetaminophen (TYLENOL) 160 MG/5ML solution 650 mg  650 mg Per PEG Tube Q6H PRN Lane Cowan DO   650 mg at 01/31/22 2254   • acetaminophen (TYLENOL) suppository 650 mg  650 mg Rectal Q6H PRN Sumeet Laughlin, DO   650 mg at 01/17/22 1434   • Adult [facility] TPN   Intravenous Once per day on Sun Tue Thu Sat Bhupinder Moss MD 80 mL/hr at 02/06/22 1728 New Bag at 02/06/22 1728   • Adult [facility] TPN   Intravenous Once per day on Mon Wed Fri Bhupinder Moss MD 80 mL/hr at 02/04/22 1706 New Bag at 02/04/22 1706   • albuterol (PROVENTIL)  nebulizer solution 0.083% 2.5 mg/3mL  2.5 mg Nebulization Q6H - RT Mario Hong MD   2.5 mg at 02/07/22 1200   • amiodarone (PACERONE) tablet 200 mg  200 mg Oral Q8H Sathya Barillas MD   200 mg at 02/07/22 1820    Followed by   • [START ON 2/11/2022] amiodarone (PACERONE) tablet 200 mg  200 mg Oral Q12H Sathya Barillas MD        Followed by   • [START ON 2/25/2022] amiodarone (PACERONE) tablet 200 mg  200 mg Oral Daily Sathya Barillas MD       • atorvastatin (LIPITOR) tablet 20 mg  20 mg Per PEG Tube Nightly Lane Cowan DO   20 mg at 02/06/22 2029   • bisacodyl (DULCOLAX) suppository 10 mg  10 mg Rectal Daily PRN Sumeet Laughlin DO   10 mg at 01/29/22 1149   • budesonide-formoterol (SYMBICORT) 160-4.5 MCG/ACT inhaler 2 puff  2 puff Inhalation BID - RT Sumeet Laughlin DO   2 puff at 02/07/22 0645   • dexmedetomidine (PRECEDEX) 400 mcg in 100 mL NS infusion  0.2-1.5 mcg/kg/hr Intravenous Titrated Bhupinder Moss MD 33.4 mL/hr at 02/07/22 1915 1.4 mcg/kg/hr at 02/07/22 1915   • dextromethorphan polistirex ER (DELSYM) 30 MG/5ML oral suspension 60 mg  60 mg Per PEG Tube Q12H PRN Lane Cowan DO   60 mg at 01/31/22 2253   • diphenhydrAMINE (BENADRYL) 12.5 MG/5ML elixir 12.5 mg  12.5 mg Oral Q6H Bhupinder Moss MD   12.5 mg at 02/07/22 1820   • guaiFENesin (ROBITUSSIN) 100 MG/5ML oral solution 400 mg  400 mg Per PEG Tube Q8H Lane Cowan DO   400 mg at 02/07/22 1534   • hydrocortisone-bacitracin-zinc oxide-nystatin (MAGIC BARRIER) ointment 1 application  1 application Topical PRN Sumeet Laughlin DO       • LORazepam (ATIVAN) injection 1 mg  1 mg Intravenous Q4H PRN Gely Ribera APRN   1 mg at 02/07/22 1832   • metoclopramide (REGLAN) injection 5 mg  5 mg Intravenous TID Bhupinder Moss MD   5 mg at 02/07/22 1535   • metoprolol tartrate (LOPRESSOR) injection 5 mg  5 mg Intravenous Q6H Bhupinder Moss MD   5 mg at 02/07/22 1535   • Morphine  sulfate (PF) injection 2 mg  2 mg Intravenous Q2H PRN Gely Ribera APRN       • OLANZapine zydis (zyPREXA) disintegrating tablet 5 mg  5 mg Oral BID Florence Ricks MD   5 mg at 02/07/22 0941   • ondansetron (ZOFRAN) tablet 4 mg  4 mg Oral Q6H PRN Sumeet Laughlin DO        Or   • ondansetron (ZOFRAN) injection 4 mg  4 mg Intravenous Q6H PRN Sumeet Laughlin DO       • oxyCODONE (ROXICODONE) 5 MG/5ML solution 5 mg  5 mg Per PEG Tube Q6H Lane Cowan DO   5 mg at 02/07/22 1820   • pantoprazole (PROTONIX) injection 40 mg  40 mg Intravenous Q12H Sumeet Laughlin DO   40 mg at 02/07/22 0941   • polyethylene glycol (MIRALAX) packet 17 g  17 g Per PEG Tube Daily Lane Cowan DO   17 g at 02/07/22 0940   • propofol (DIPRIVAN) infusion 10 mg/mL 100 mL  5-75 mcg/kg/min Intravenous Titrated Sumeet Laughlin DO   Stopped at 02/07/22 0930   • sennosides-docusate (PERICOLACE) 8.6-50 MG per tablet 1 tablet  1 tablet Per PEG Tube BID Lane Cowan DO   1 tablet at 02/07/22 0941   • sodium chloride 0.9 % flush 10 mL  10 mL Intravenous PRN Sumeet Laughlin DO   10 mL at 02/07/22 0940   • terazosin (HYTRIN) capsule 1 mg  1 mg Per PEG Tube Nightly Lane Cowan DO   1 mg at 02/06/22 2030   • Tranexamic Acid 100 mg in sodium chloride 0.9 % 100 mL  100 mg Injection Once Liam Bergman Jr., MD           Assessment/Plan       Pneumonia due to COVID-19 virus    Acute hypoxemic respiratory failure (HCC)    Acute respiratory distress syndrome (ARDS) due to 2019 novel coronavirus (HCC)    Obesity (BMI 30-39.9)    Acute subsegmental pulmonary embolism without acute cor pulmonale (HCC)    Ventilator dependent (HCC)    Hx of tracheostomy    Severe malnutrition (CMS/HCC)    Cytokine release syndrome, grade 4    Hemoptysis      Discussing with the respiratory therapist and the treating nurses, this was a very brisk bleed that stopped spontaneously.  The bleeding was through the tracheostomy tube,  around the tracheostomy tube,   He does not have any granulation of the distal tip of his tracheostomy tube.  I removed the tracheostomy dressing, and inspected the immediate tissue surrounding the tracheostomy tube itself, and there is no active bleeding here.  I did packed this area with Surgicel.  There does not appear to be any obvious source of the bleeding in the immediate subglottis, however visualization was difficult due to the amount of secretions.  I was somewhat worried that he may have a tracheal innominate fistula developing, and will order a CTA of the neck and chest to assess.  Per Dr. Bergman,  I have recommended continuing with the TXA treatment topically around the tracheostomy tube.  Additionally we will obtain a type and screen.  We will cancel his transfer to the LTAC until we know the results of the CTA, and we know that he is not bleeding anymore.    Gab Osuna MD  02/07/22  19:36 CST

## 2022-02-08 NOTE — PROGRESS NOTES
[facility] Medicine Services  INPATIENT PROGRESS NOTE    Patient Name: Eddy Ferro  Date of Admission: 1/11/2022  Today's Date: 02/08/22  Length of Stay: 28  Primary Care Physician: Provider, No Known    Subjective   Chief Complaint: f/u hemoptysis    HPI   Patient did fairly well overnight.  No obvious/significant bleeding noted from trach.  He had a trach eval last night by Dr. Osei with no obvious issues seen.  Vitals have been stable overnight minimal vent.  He is on Precedex alone.  Still having some tube feed residuals overnight.  Tube feed on hold currently.  Afebrile.    Review of Systems   Unable to assess due to patient factors    Objective    Temp:  [97.1 °F (36.2 °C)-99.1 °F (37.3 °C)] 99.1 °F (37.3 °C)  Heart Rate:  [] 79  Resp:  [25-36] 36  BP: ()/() 142/97  FiO2 (%):  [30 %] 30 %  Physical Exam  GEN: intubated/sedated, in NAD  HEENT: PERRLA, Anicteric, +trach  Lungs:  no wheezing/rales/rhonchi  Heart: RRR, +S1/s2, no rub  ABD: soft, nt/nd, +BS, no guarding/rebound  Extremities: atraumatic, no cyanosis  Skin: no petechiae  Neuro: unable to fully assess due to sedation but moves all extremities           Results Review:  I have reviewed the labs, radiology results, and diagnostic studies.    Laboratory Data:   Results from last 7 days   Lab Units 02/08/22 0313 02/07/22  1934 02/06/22  0629 02/05/22  0300 02/05/22  0300   WBC 10*3/mm3 9.68  --  8.29  --  7.63   HEMOGLOBIN g/dL 10.6* 12.1* 10.9*   < > 10.6*   HEMATOCRIT % 35.7* 38.3 35.2*   < > 33.8*   PLATELETS 10*3/mm3 235  --  182  --  214    < > = values in this interval not displayed.        Results from last 7 days   Lab Units 02/08/22 0313 02/06/22  0629 02/05/22  0300 02/03/22  0239 02/03/22  0239 02/02/22  0311 02/02/22  0311   SODIUM mmol/L 137 140 138   < > 137   < > 136   POTASSIUM mmol/L 3.5 4.0 3.7   < > 4.5   < > 3.9   CHLORIDE mmol/L 104 106 105   < > 104   < > 99   CO2 mmol/L  25.0 25.0 25.0   < > 27.0   < > 29.0   BUN mg/dL 25* 15 16   < > 21*   < > 23*   CREATININE mg/dL 0.33* 0.36* 0.34*   < > 0.42*   < > 0.42*   CALCIUM mg/dL 8.2* 8.4* 8.3*   < > 8.1*   < > 8.1*   BILIRUBIN mg/dL  --   --  1.0  --  1.3*  --  1.2   ALK PHOS U/L  --   --  184*  --  172*  --  148*   ALT (SGPT) U/L  --   --  59*  --  53*  --  39   AST (SGOT) U/L  --   --  61*  --  69*  --  45*   GLUCOSE mg/dL 114* 110* 108*   < > 100*   < > 114*    < > = values in this interval not displayed.       Culture Data:   No results found for: BLOODCX, URINECX, WOUNDCX, MRSACX, RESPCX, STOOLCX    Radiology Data:   Imaging Results (Last 24 Hours)     Procedure Component Value Units Date/Time    CT Angiogram Chest [[id number]] Collected: 02/07/22 2235     Updated: 02/07/22 2241    Narrative:      CT ANGIOGRAM CHEST- 2/7/2022 9:02 PM CST      HISTORY: Acute bleed through tracheostomy.  Assess for traneoinnominate  fistula; A41.9-Sepsis, unspecified organism; R65.21-Severe sepsis with  septic shock; Z99.11-Dependence on respirator (ventilator) status;  U07.1-COVID-19; J80-Acute respiratory distress syndrome; U07.1-COVID-19;  J12.82-Pneumonia due to coronavirus disease 2019; I26.99-Other pulmonary  embolism without acute cor pulmonale; P45-Tbakiecnoz      COMPARISON: None.      DOSE LENGTH PRODUCT: 526 mGy cm. Automated exposure control was also  utilized to decrease patient radiation dose.     TECHNIQUE: Helical tomographic images of the chest were obtained after  the administration of intravenous contrast following angiogram protocol.  Additionally, 3D and multiplanar reformatted images were provided.        FINDINGS:    Pulmonary arteries: There is adequate enhancement of the pulmonary  arteries to evaluate for central and segmental pulmonary emboli. There  are no filling defects within the main, lobar, segmental or visualized  subsegmental pulmonary arteries. The pulmonary arteries are within  normal limits for size.      Aorta and  great vessels: The aorta is well opacified and demonstrates no  dissection or aneurysm. The great vessels are normal in appearance.     Visualized neck base: The imaged portion of the base of the neck appears  unremarkable. Tracheostomy tube is present.     Lungs: Bilateral groundglass pulmonary infiltrates are present. This is  consistent with Covid 19 pneumonia. Small moderate right posterior  pleural effusion is present.. Tracheostomy tube is noted..      Heart: The heart is normal in size. There is no pericardial effusion.      Mediastinum and lymph nodes: No enlarged mediastinal, hilar, or axillary  lymph nodes are present.      Skeletal and soft tissues: The osseous structures of the thorax and  surrounding soft tissues demonstrate no acute process.     Upper abdomen: The imaged portion of the upper abdomen demonstrates no  acute process.        Impression:      1. Bilateral groundglass infiltrates consistent with pneumonia.  2. Tracheostomy tube is satisfactorily position. There is no evidence of  a vascular fistula..        This report was finalized on 02/07/2022 22:38 by Dr. Jerardo Broderick MD.    CT Angiogram Neck [[id number]] Collected: 02/07/22 2231     Updated: 02/07/22 2238    Narrative:      EXAMINATION:   CT ANGIOGRAM NECK-  2/7/2022 10:31 PM CST     HISTORY: CT ANGIOGRAM OF THE NECK 2/7/2022 9:02 PM CST     HISTORY: Acute bleed through tracheostomy.  Assess for traneoinnominate  fistula; A41.9-Sepsis, unspecified organism; R65.21-Severe sepsis with  septic shock; Z99.11-Dependence on respirator (ventilator) status;  U07.1-COVID-19; J80-Acute respiratory distress syndrome; U07.1-COVID-19;  J12.82-Pneumonia due to coronavirus disease 2019; I26.99-Other pulmonary  embolism without acute cor pulmonale; W22-Tpjzftauhn     COMPARISON: None      DLP: 360 mGy cm     In order to have a CT radiation dose as low as reasonably achievable,  Automated Exposure Control was utilized for adjustment of the mA  and/or  KV according to patient size.     TECHNIQUE: Following the uneventful administration of Isovue contrast,  serial helical tomographic images of the neck were obtained following  angiogram protocol. Multiplanar MIP and 3D reconstructions were provided  for review.       FINDINGS:      Angiogram:   The aortic arch and visualized great vessels are patent, without  evidence of aneurysm, dissection, vessel cutoff, or flow limiting  stenosis.      The common carotid, internal carotid and external carotid arteries are  patent bilaterally, without evidence of aneurysm, dissection, vessel  cutoff, or flow-limiting stenosis.      Posteriorly, the vertebral arteries and visualized basilar artery  demonstrate no aneurysm, dissection, vessel cutoff, or flow-limiting  stenosis.      Other findings: Tracheostomy tube is present. There is no evidence of a  vascular communication with a tracheostomy tube.. There is no mass or  significant lymphadenopathy. The lung apices are clear. No acute osseous  abnormality is noted. Fluid is present in the sphenoid air cells..       Impression:      Impression:   1. NASCET criteria were utilized to evaluate stenoses.   2. Unremarkable CT angiogram of the neck.  3. There is no evidence of a vascular fistula with a tracheostomy tube.  4. Fluid in the sphenoid sinuses     This report was finalized on 02/07/2022 22:35 by Dr. Jerardo Broderick MD.    XR Chest 1 View [[id number]] Collected: 02/07/22 1013     Updated: 02/07/22 1018    Narrative:      Frontal upright radiograph of the chest 2/7/2022 9:33 AM CST     HISTORY: Mechanical ventilation     COMPARISON: Chest exam dated 1/25/2022.     FINDINGS:      Tracheostomy is in good position. Known pulmonary infiltrates are much  improved, with only mild residual opacities in the extreme lung bases.  Lungs are well expanded. No pleural effusion. Cardiomediastinal  silhouette and pulmonary vasculature are unremarkable. Left subclavian  central line is  in good position. Enteric tube was discontinued.       Impression:      1. Tracheostomy is in good position. Lungs are well expanded.  2. Underlying pulmonary infiltrates are much improved, with minimal  residual infiltrate in the extreme lung bases.     This report was finalized on 02/07/2022 10:15 by Dr Kory Burks, .          I have reviewed the patient's current medications.     Assessment/Plan     Active Hospital Problems    Diagnosis    • **Pneumonia due to COVID-19 virus    • Surgical complication involving respiratory system    • Cytokine release syndrome, grade 4    • Hemoptysis    • Severe malnutrition (CMS/HCC)    • Hx of tracheostomy    • Ventilator dependent (HCC)    • Acute hypoxemic respiratory failure (HCC)    • Acute respiratory distress syndrome (ARDS) due to 2019 novel coronavirus (HCC)    • Obesity (BMI 30-39.9)    • Acute subsegmental pulmonary embolism without acute cor pulmonale (HCC)        Plan:  #1 acute respiratory failure hypoxia -multifactorial in the setting of Covid, PE, Klebsiella pneumonia.  He has essentially completed all treatments.  Post Decadron.  Post antibiotics.  Had been doing well post trach on 1/25 with plans to transfer to [facility] when stable.  Hopefully tomorrow.  No ventilator changes post events last night and doing well today.  ENT and pulmonary following.    #2  PE -has been on Lovenox but now on hold due to hemoptysis.  CTA last night showed no PE.  Will get lower extremity Dopplers.  Discussed with pulmonary and if Dopplers negative can transition to prophylactic dose Lovenox.    #3  COVID-19 -treatments not of isolation.    #4 nutrition -increase tube feed as well as again today.  We will get a KUB.  Has TPN running again.    #5 urinary retention-Moss catheter in place    #6 hemoptysis - spontaneous and resolved, scope last night showed no obvious bleeding. CTA showed no fistula. Plan to exchange trach and do full scope later on today. If stable can likely  be transferred again tomorrow if bed available.       Discharge Planning: Ongoing. Plans to monitor today and if stable post trach exchange plan to DC to [place] tomorrow.    Electronically signed by Lane Cowan DO, 02/08/22, 08:20 CST.    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  Discussed case with Dr. Bergman of ENT.  Again his tube exchange went well earlier today without any signs of bleeding or evidence of fistula or other disease.  He is okay with resuming Lovenox tonight and monitoring overnight for any signs of bleeding.  If no bleeding overnight okay to continue with plan to discharge to [place].  If any evidence for bleeding we will again stop blood thinner and consult vascular for potential IVC filter.    Electronically signed by Lane Cowan DO, 02/08/22, 6:32 PM CST.

## 2022-02-08 NOTE — CASE MANAGEMENT/SOCIAL WORK
Continued Stay Note  [facility]     Patient Name: Eddy Ferro  MRN: [id number]  Today's Date: 2/8/2022    Admit Date: 1/11/2022     Discharge Plan     Row Name 02/08/22 1013       Plan    Plan [facility]    Plan Comments Patient has a bed held for him at [facility] for today.  Tentative discharge for Wednesday, February 9th with an on the road time of 9 am.  [facility] is aware of tentative departure time at 9 am as well as physician.  [facility] will need a nurse to accompany patient with transfer.    Patient is going to [id number] [facility], 3rd floor, [place], [place].       Patient is going to room number 3372.  Accepting MD is Dr. Paul and attending physician has been informed of [facility]'s request for a doctor to doctor consultation.  Dr. Paul can be reached at [phone].  Nursing to call report to [phone].     Marquita with [facility] can be reached at [phone].                    Discharge Codes    No documentation.               Expected Discharge Date and Time     Expected Discharge Date Expected Discharge Time    Feb 7, 2022             MAURY Das

## 2022-02-08 NOTE — OP NOTE
PATIENT NAME:  Eddy Ferro    DATE: 02/07/22    PREPROCEDURE DIAGNOSIS: Bleeding from tracheostomy    POSTPROCEDURE DIAGNOSIS: Bleeding from tracheostomy    PROCEDURE:  1) Flexible fiberoptic laryngoscopy   2) tracheoscopy through tracheostomy    SURGEON:  Gab Osuna MD, FACS    FACILITY: [facility]    ANESTHESIA: None    DICTATED BY:  Gab Osuna MD, FACS    IVF: None    IMPLANTS: None    DRAINS: None    SPECIMENS: None    EBL: None    COMPLICATIONS: None apparent    INDICATIONS FOR PROCEDURE: The patient suffered from acute bleed through the lumen of his tracheostomy tube, around the tracheostomy tube itself, and transorally.    PERTINENT SUMMARY OF FINDINGS: No active bleeding was noted through the tracheostomy tube, no granulation tissue was noted at the distal tip.  The flexible laryngoscopy view was somewhat limited due to the patient's secretions, but no obvious active bleeding was noted.  His vocal cords were mobile.  I did not see any active bleeding in the immediate subglottis.    PROCEDURE DETAILS:     The disposable laryngoscopy equipment was set up.  The scope was passed through the tracheostomy tube after removing the circuit.  The tube was slightly bloodstained, but no active bleeding was noted.  The trachea was visualized as were the rell, and no granulation tissue was noted.  There is no active bleeding.  The scope was with removed, and the anesthesia circuit was hooked back up.    The scope was passed to the patient's right nasal cavity where no bleeding was noted.  There were copious secretions within his oral cavity and oropharynx.  A Yankauer was used to suction his secretions.  The scope was then passed back transnasally where I was able to visualize the vocal folds, and they were mobile as the patient was coughing.  Immediate subglottis was without any obvious bleeding.  The scope was also removed and passed transorally, and I was not able to make it past the base  of the tongue without the patient gagging and coughing.  No active bleeding was noted.  The scope was withdrawn.      DISPOSITION:  The procedures were completed without complication and tolerated well.       Gab Osuna MD, FACS  Board Certified Facial Plastic and Reconstructive Surgery  Board Certified Otolaryngology -- Head and Neck     Electronically signed by Gab Osuna MD, 02/07/22, 7:48 PM CST.

## 2022-02-08 NOTE — PLAN OF CARE
Goal Outcome Evaluation:  Plan of Care Reviewed With: patient        Progress: no change  Outcome Summary: Patient was weaned off of Propofol and has remained calm with just Precedex today. Spontaneous on ventilator, tolerated. High residuals with TF stopped this AM, residuals remained elevated with rechecks throughout the day. This evening around 1830 there was a small amount of bleeding noted around the tracheostomy site with the patient beginning to have a coughing episode. The patient was suctioned with minimal sectretions orally and through the trach. RT was called to assist with changing the trach dressing, upon their arrival the dressing was saturated with blood and a large amount of blood clots around the trach site and the patient was coughing up blood clots orally. At this time PRN ativan was given due to the patients anxiety with continued coughing. About 200-300 was suctioned into the canister of bright red blood. ENT was paged with orders received as well as Dr. Osuna coming to the bedside. Transfer to [facility] on hold [facility] for evaluation of bleeding. Marquita canas [place] notified, she said will check back tomorrow to asses the situation.

## 2022-02-08 NOTE — PROCEDURES
[facility] Otolaryngology Head and Neck Surgery  PROCEDURE NOTE    Anesthesia: none    Indications for Procedure:     Pneumonia due to COVID-19 virus    Acute hypoxemic respiratory failure (HCC)    Acute respiratory distress syndrome (ARDS) due to 2019 novel coronavirus (HCC)    Obesity (BMI 30-39.9)    Acute subsegmental pulmonary embolism without acute cor pulmonale (HCC)    Ventilator dependent (HCC)    Hx of tracheostomy    Severe malnutrition (CMS/HCC)    Cytokine release syndrome, grade 4    Hemoptysis    Surgical complication involving respiratory system      Endoscopy Type: Flexible Tracheobronchoscopy    Indications for Procedure:   Tracheostomy evaluation  Trachea evaluation    Procedure Details:    The patient was placed in the sitting position.  The SCOPE TYPE: Flexible laryngoscope was passed both via the tracheostome and the tracheostomy tube .  The trachea from the subglottis to the rell was examined.  A retrograde examination of the Vocal cords and subglottis was carried out during respiration and phonation.  The following findings were noted:    Findings:   TRACHEO-BRONCHOSCOPY:    Stoma: Old Surgicel present, removed, necrotic debris, no active bleeding, some old blood clot present   Distal Trachea: Intact, no bleeding, no evidence of tracheoinnominate fistula, rell sharp, MSB patent, no bloody secretions   Proximal trachea: Intact, some secretions, no evidence of bleeding   True Vocal cords: Mobile from below, secretions penetrating   Subglottis: Secretions present, no subglottic stenosis     Condition:  Stable.  Patient tolerated procedure well.    Complications:  None    Procedure:  Tracheostomy tube change    Procedure Details:    The patient's respiratory status was assessed.  The trach tube in position was assessed.  The patient was positioned.  The trach tube was removed without difficulty. The stoma and trachea were inspected.    A new tracheostomy tube was re-  inserted. Trach tube position was confirmed with scope. Trach tube type:  Shiley trach tube Size  8 Cuffed DIC, flexible    Findings: See above    Condition:  Stable.  Patient tolerated procedure well.    Complications:  None    Post-procedure instructions reviewed with Nursing staff     Electronically signed by Liam Bergman Jr, MD, 02/08/22, 1:23 PM CST.

## 2022-02-08 NOTE — PROGRESS NOTES
[facility] Medicine Services  INPATIENT PROGRESS NOTE    Patient Name: Eddy Ferro  Date of Admission: 1/11/2022  Today's Date: 02/07/22  Length of Stay: 27  Primary Care Physician: Provider, No Known    Subjective   Chief Complaint: f/u hemoptysis    HPI   Patient had been doing well this morning.  Plans were for discharge to [facility] in [place].  All arrangements were made and nursing was getting ready to call report for transfer when apparently patient became tachycardic and started having hemoptysis from tracheostomy tube and from around his trach site.  ENT reportedly called and recommended putting TXA time tube and Surgicel around it with plans to scope in the morning.        Review of Systems   Unable to assess due to patient factors    Objective    Temp:  [97.1 °F (36.2 °C)-99 °F (37.2 °C)] 97.8 °F (36.6 °C)  Heart Rate:  [] 108  Resp:  [25-35] 35  BP: ()/() 157/107  FiO2 (%):  [30 %] 30 %  Physical Exam  GEN: intubated/sedated, in NAD  HEENT: PERRLA, Anicteric, +trach  Lungs:  no wheezing/rales/rhonchi  Heart: RRR, +S1/s2, no rub  ABD: soft, nt/nd, +BS, no guarding/rebound  Extremities: atraumatic, no cyanosis  Skin: no petechiae  Neuro: unable to fully assess due to sedation but moves all extremities           Results Review:  I have reviewed the labs, radiology results, and diagnostic studies.    Laboratory Data:   Results from last 7 days   Lab Units 02/06/22  0629 02/05/22 0300 02/03/22  0239   WBC 10*3/mm3 8.29 7.63 8.92   HEMOGLOBIN g/dL 10.9* 10.6* 10.3*   HEMATOCRIT % 35.2* 33.8* 34.0*   PLATELETS 10*3/mm3 182 214 217        Results from last 7 days   Lab Units 02/06/22  0629 02/05/22  0300 02/03/22  0239 02/02/22  0311 02/02/22  0311   SODIUM mmol/L 140 138 137   < > 136   POTASSIUM mmol/L 4.0 3.7 4.5   < > 3.9   CHLORIDE mmol/L 106 105 104   < > 99   CO2 mmol/L 25.0 25.0 27.0   < > 29.0   BUN mg/dL 15 16 21*   < > 23*   CREATININE  mg/dL 0.36* 0.34* 0.42*   < > 0.42*   CALCIUM mg/dL 8.4* 8.3* 8.1*   < > 8.1*   BILIRUBIN mg/dL  --  1.0 1.3*  --  1.2   ALK PHOS U/L  --  184* 172*  --  148*   ALT (SGPT) U/L  --  59* 53*  --  39   AST (SGOT) U/L  --  61* 69*  --  45*   GLUCOSE mg/dL 110* 108* 100*   < > 114*    < > = values in this interval not displayed.       Culture Data:   No results found for: BLOODCX, URINECX, WOUNDCX, MRSACX, RESPCX, STOOLCX    Radiology Data:   Imaging Results (Last 24 Hours)     Procedure Component Value Units Date/Time    XR Chest 1 View [[id number]] Collected: 02/07/22 1013     Updated: 02/07/22 1018    Narrative:      Frontal upright radiograph of the chest 2/7/2022 9:33 AM CST     HISTORY: Mechanical ventilation     COMPARISON: Chest exam dated 1/25/2022.     FINDINGS:      Tracheostomy is in good position. Known pulmonary infiltrates are much  improved, with only mild residual opacities in the extreme lung bases.  Lungs are well expanded. No pleural effusion. Cardiomediastinal  silhouette and pulmonary vasculature are unremarkable. Left subclavian  central line is in good position. Enteric tube was discontinued.       Impression:      1. Tracheostomy is in good position. Lungs are well expanded.  2. Underlying pulmonary infiltrates are much improved, with minimal  residual infiltrate in the extreme lung bases.     This report was finalized on 02/07/2022 10:15 by Dr Kory Burks, .          I have reviewed the patient's current medications.     Assessment/Plan     Active Hospital Problems    Diagnosis    • **Pneumonia due to COVID-19 virus    • Cytokine release syndrome, grade 4    • Hemoptysis    • Severe malnutrition (CMS/HCC)    • Hx of tracheostomy    • Ventilator dependent (HCC)    • Acute hypoxemic respiratory failure (HCC)    • Acute respiratory distress syndrome (ARDS) due to 2019 novel coronavirus (HCC)    • Obesity (BMI 30-39.9)    • Acute subsegmental pulmonary embolism without acute cor pulmonale (HCC)         Plan:  #1 acute respiratory failure hypoxia -to factorial in the setting of Covid, PE, Klebsiella pneumonia.  He is essentially completed all treatments.  Post Decadron.  Post antibiotics.  Had been doing well post trach on 1/25 with plans to transfer to [facility] today but now having hemoptysis.  No ventilator changes per report.  ENT aware.  Pulmonary called.    #2  PE -has been on Lovenox but will hold that tonight given hemoptysis.  Again ENT to scope tomorrow morning.  We will make pulmonary aware as well.    #3  COVID-19 -treatments not of isolation.    #4 nutrition -tube feeds with improving residuals.    #5 urinary retention-Moss catheter in place    #6 hemoptysis -coming from tracheostomy tube and around per reports.  Again as above TXA down the tube and Surgicel recommendations by ENT with plan to scope in the morning.  Holding Lovenox.      Discharge Planning: Ongoing.  As above was plan for discharge to [facility] today but discharge will be held to assess hemoptysis.    Electronically signed by Lane Cowan DO, 02/07/22, 19:05 CST.

## 2022-02-08 NOTE — PROGRESS NOTES
RT called to bedside. Patient coughing up blood and trach oozing with bright red blood. RN informed of active bleeding and instructed to call ENT.

## 2022-02-08 NOTE — PROGRESS NOTES
PULMONARY AND CRITICAL CARE PROGRESS NOTE - [facility]    Patient: Eddy Ferro  1970   MR# [id number]   Acct# [id number]  02/08/22   08:12 CST  Referring Provider: Lane Cowan DO    Chief Complaint: Acute respiratory failure.  Mechanical ventilation.  COVID-19 pneumonia, tracheostomy and PEG placement done.    Interval history: The patient is resting in bed with tracheostomy to mechanical ventilator. He is currently in PSV 10/5, 0.30 FiO2. Precedex is infusing. TPN infusing. Overnight issues with bleeding through tracheostomy. ENT performed flexible fiberoptic laryngoscopy and tracheoscopy through tracheostomy yesterday evening. Nursing reports that he had two smaller episodes of bleeding throughout the night. Bleeding appears to be controlled this morning. Nursing reports that ENT has plans to change out the patient's trach today and further evaluate. Hgb 10.6.  Transfer to [place] is on hold.  CTA of the neck and chest were reviewed as noted below. Blood thinning agents are on hold at this time.  Meds:  albuterol, 2.5 mg, Nebulization, Q6H - RT  amiodarone, 200 mg, Oral, Q8H   Followed by  [START ON 2/11/2022] amiodarone, 200 mg, Oral, Q12H   Followed by  [START ON 2/25/2022] amiodarone, 200 mg, Oral, Daily  atorvastatin, 20 mg, Per PEG Tube, Nightly  budesonide-formoterol, 2 puff, Inhalation, BID - RT  diphenhydrAMINE, 12.5 mg, Oral, Q6H  guaiFENesin, 400 mg, Per PEG Tube, Q8H  metoclopramide, 5 mg, Intravenous, TID  metoprolol tartrate, 5 mg, Intravenous, Q6H  OLANZapine zydis, 5 mg, Oral, BID  oxyCODONE, 5 mg, Per PEG Tube, Q6H  pantoprazole, 40 mg, Intravenous, Q12H  polyethylene glycol, 17 g, Per PEG Tube, Daily  senna-docusate sodium, 1 tablet, Per PEG Tube, BID  terazosin, 1 mg, Per PEG Tube, Nightly      Adult [facility] TPN, , Last Rate: 80 mL/hr at 02/06/22 1728  Adult Custom [facility] TPN, , Last Rate: 80 mL/hr at 02/07/22 2002  dexmedetomidine, 0.2-1.5 mcg/kg/hr, Last  Rate: 1.3 mcg/kg/hr (02/08/22 0805)  propofol, 5-75 mcg/kg/min, Last Rate: Stopped (02/07/22 0930)      Review of Systems:   Review of Systems   Unable to perform ROS: Intubated     Physical Exam:  SpO2 Percentage    02/08/22 0500 02/08/22 0600 02/08/22 0658   SpO2: 99% 100% 100%     Body mass index is 28.09 kg/m².   Temp:  [97.1 °F (36.2 °C)-99.1 °F (37.3 °C)] 99.1 °F (37.3 °C)  Heart Rate:  [] 79  Resp:  [25-36] 36  BP: ()/() 142/97  FiO2 (%):  [30 %] 30 %    Intake/Output Summary (Last 24 hours) at 2/8/2022 0812  Last data filed at 2/8/2022 0400  Gross per 24 hour   Intake 2648.5 ml   Output 1390 ml   Net 1258.5 ml     Physical Exam  Constitutional:       General: He is sleeping. He is not in acute distress.     Appearance: He is ill-appearing. He is not diaphoretic.   HENT:      Head: Normocephalic.      Nose: Nose normal.      Mouth/Throat:      Mouth: Mucous membranes are moist.   Eyes:      General: No scleral icterus.  Neck:      Comments: Vent to trach  Cardiovascular:      Rate and Rhythm: Normal rate.   Pulmonary:      Effort: Pulmonary effort is normal. No respiratory distress.      Breath sounds: Rhonchi present. No wheezing.   Abdominal:      General: There is no distension.      Comments: PEG   Genitourinary:     Comments: FC  Musculoskeletal:      Right lower leg: No edema.      Left lower leg: No edema.      Comments: SCDs   Skin:     Coloration: Skin is not pale.   Neurological:      Comments: precedex     Electronically signed by SHAHNAZ Bautista, 2/8/2022, 08:12 CST      Physician Substantive Portion: Medical Decision Making:    Laboratory Data:  Results from last 7 days   Lab Units 02/08/22  0313 02/07/22  1934 02/06/22  0629 02/05/22  0300 02/05/22  0300   WBC 10*3/mm3 9.68  --  8.29  --  7.63   HEMOGLOBIN g/dL 10.6* 12.1* 10.9*   < > 10.6*   PLATELETS 10*3/mm3 235  --  182  --  214    < > = values in this interval not displayed.     Results from last 7 days   Lab  Units 02/08/22  0313 02/06/22  0629 02/05/22  0300   SODIUM mmol/L 137 140 138   POTASSIUM mmol/L 3.5 4.0 3.7   BUN mg/dL 25* 15 16   CREATININE mg/dL 0.33* 0.36* 0.34*     Results from last 7 days   Lab Units 02/08/22  0405 02/07/22  0325 02/06/22  0309   PH, ARTERIAL pH units 7.456* 7.471* 7.429   PCO2, ARTERIAL mm Hg 36.4 36.2 40.4   PO2 ART mm Hg 101.0 107.0 102.0   FIO2 % 30 30 30     No results found for: BLOODCX, URINECX, WOUNDCX, MRSACX, RESPCX, STOOLCX  Recent films:  CT Angiogram Neck    Result Date: 2/7/2022  Impression: 1. NASCET criteria were utilized to evaluate stenoses. 2. Unremarkable CT angiogram of the neck. 3. There is no evidence of a vascular fistula with a tracheostomy tube. 4. Fluid in the sphenoid sinuses  This report was finalized on 02/07/2022 22:35 by Dr. Jerardo Broderick MD.    XR Chest 1 View    Result Date: 2/7/2022  1. Tracheostomy is in good position. Lungs are well expanded. 2. Underlying pulmonary infiltrates are much improved, with minimal residual infiltrate in the extreme lung bases.  This report was finalized on 02/07/2022 10:15 by Dr Kory Burks, .    CT Angiogram Chest    Result Date: 2/7/2022  1. Bilateral groundglass infiltrates consistent with pneumonia. 2. Tracheostomy tube is satisfactorily position. There is no evidence of a vascular fistula..   This report was finalized on 02/07/2022 22:38 by Dr. Jerardo Broderick MD.    Films reviewed personally by me.       My radiograph interpretation/independent review of imaging: Reviewed and agree with current interpretation.    Pulmonary Assessment:    1. Acute respiratory failure due to COVID-19   2. On mechanical ventilation failed weaning trials  3. S/p tracheostomy placement   4. Bleeding from tracheostomy site tracheostomy changed.  5. Acute subsegmental pulmonary embolism  6. SARS-COV2 viral pneumonia  7. Status post completion of remdesivir and baricitinib  8. Secondary bacterial infection with prior history of baricitinib  therapy  9. Pulmonary embolism stable  10. Ventilator dyssynchrony improved  11. S/p PEG tube placement today.  12. Atrial fibrillation with rapid ventricle response    Recommend/plan:   · Patient is on pressure support ventilation 10/5 30% FiO2 this morning.  · Bleeding from tracheostomy site. Lovenox held.  · Patient had a scope done this morning by    · He did change the tracheostomy to size 8 cuffed Shiley tracheostomy tube.  · Active bleeding was noted. Patient has subsegmental PE  · Ultrasound of the legs done. There are probable DVT noted in the leg and patient may need to resume anticoagulation.  · Patient is waiting for transfer to the [facility] in [facility]  · Patient still has a lot of residual and is unable to start tube feeding. Still on TPN.  · Patient completed dexamethasone. Continue adjunct treatment for COVID-19.  · He is out of Covid isolation now and appears to be more alert and oriented.  · He is currently getting Precedex but off any other sedation.  · Continue bronchodilator treatment routine respiratory care.  · Continue pain and anxiety control and he is still on Zyprexa for anxiety.  · CODE STATUS: Full. Overall prognosis guarded.  · We will follow       This visit was performed by both a physician and an [facility] RN.  I personally evaluated and examined the patient.  I performed all aspects of the medical decision making as documented.    Electronically signed by     Florence Ricks MD,  Pulmonologist/intensivist   2/8/2022, 15:48 CST

## 2022-02-09 VITALS
TEMPERATURE: 98.2 F | DIASTOLIC BLOOD PRESSURE: 81 MMHG | WEIGHT: 196.65 LBS | OXYGEN SATURATION: 99 % | HEIGHT: 70 IN | RESPIRATION RATE: 26 BRPM | HEART RATE: 89 BPM | SYSTOLIC BLOOD PRESSURE: 123 MMHG | BODY MASS INDEX: 28.15 KG/M2

## 2022-02-09 LAB
HCT VFR BLD AUTO: 34.3 % (ref 37.5–51)
HGB BLD-MCNC: 10.2 G/DL (ref 13–17.7)

## 2022-02-09 PROCEDURE — 85014 HEMATOCRIT: CPT | Performed by: INTERNAL MEDICINE

## 2022-02-09 PROCEDURE — 25010000002 ENOXAPARIN PER 10 MG: Performed by: INTERNAL MEDICINE

## 2022-02-09 PROCEDURE — 94799 UNLISTED PULMONARY SVC/PX: CPT

## 2022-02-09 PROCEDURE — 99232 SBSQ HOSP IP/OBS MODERATE 35: CPT | Performed by: OTOLARYNGOLOGY

## 2022-02-09 PROCEDURE — 25010000002 METOCLOPRAMIDE PER 10 MG: Performed by: INTERNAL MEDICINE

## 2022-02-09 PROCEDURE — 85018 HEMOGLOBIN: CPT | Performed by: INTERNAL MEDICINE

## 2022-02-09 PROCEDURE — 94003 VENT MGMT INPAT SUBQ DAY: CPT

## 2022-02-09 PROCEDURE — 99232 SBSQ HOSP IP/OBS MODERATE 35: CPT | Performed by: INTERNAL MEDICINE

## 2022-02-09 RX ADMIN — PANTOPRAZOLE SODIUM 40 MG: 40 INJECTION, POWDER, FOR SOLUTION INTRAVENOUS at 08:06

## 2022-02-09 RX ADMIN — METOPROLOL TARTRATE 5 MG: 5 INJECTION, SOLUTION INTRAVENOUS at 08:06

## 2022-02-09 RX ADMIN — AMIODARONE HYDROCHLORIDE 200 MG: 200 TABLET ORAL at 08:15

## 2022-02-09 RX ADMIN — DEXMEDETOMIDINE HYDROCHLORIDE 1.3 MCG/KG/HR: 4 INJECTION, SOLUTION INTRAVENOUS at 05:34

## 2022-02-09 RX ADMIN — GUAIFENESIN 400 MG: 100 SOLUTION ORAL at 05:35

## 2022-02-09 RX ADMIN — OXYCODONE HYDROCHLORIDE 5 MG: 5 SOLUTION ORAL at 00:54

## 2022-02-09 RX ADMIN — OLANZAPINE 5 MG: 5 TABLET, ORALLY DISINTEGRATING ORAL at 08:05

## 2022-02-09 RX ADMIN — DEXMEDETOMIDINE HYDROCHLORIDE 1.3 MCG/KG/HR: 4 INJECTION, SOLUTION INTRAVENOUS at 09:02

## 2022-02-09 RX ADMIN — OXYCODONE HYDROCHLORIDE 5 MG: 5 SOLUTION ORAL at 05:35

## 2022-02-09 RX ADMIN — DIPHENHYDRAMINE HYDROCHLORIDE 12.5 MG: 12.5 SOLUTION ORAL at 05:35

## 2022-02-09 RX ADMIN — AMIODARONE HYDROCHLORIDE 200 MG: 200 TABLET ORAL at 00:54

## 2022-02-09 RX ADMIN — BUDESONIDE AND FORMOTEROL FUMARATE DIHYDRATE 2 PUFF: 160; 4.5 AEROSOL RESPIRATORY (INHALATION) at 07:06

## 2022-02-09 RX ADMIN — DIPHENHYDRAMINE HYDROCHLORIDE 12.5 MG: 12.5 SOLUTION ORAL at 00:54

## 2022-02-09 RX ADMIN — DOCUSATE SODIUM 50 MG AND SENNOSIDES 8.6 MG 1 TABLET: 8.6; 5 TABLET, FILM COATED ORAL at 08:06

## 2022-02-09 RX ADMIN — POLYETHYLENE GLYCOL 3350 17 G: 17 POWDER, FOR SOLUTION ORAL at 08:06

## 2022-02-09 RX ADMIN — DEXMEDETOMIDINE HYDROCHLORIDE 1.3 MCG/KG/HR: 4 INJECTION, SOLUTION INTRAVENOUS at 01:58

## 2022-02-09 RX ADMIN — METOCLOPRAMIDE HYDROCHLORIDE 5 MG: 5 INJECTION INTRAMUSCULAR; INTRAVENOUS at 08:06

## 2022-02-09 RX ADMIN — SODIUM CHLORIDE, PRESERVATIVE FREE 10 ML: 5 INJECTION INTRAVENOUS at 08:06

## 2022-02-09 RX ADMIN — ALBUTEROL SULFATE 2.5 MG: 2.5 SOLUTION RESPIRATORY (INHALATION) at 07:06

## 2022-02-09 RX ADMIN — ENOXAPARIN SODIUM 90 MG: 100 INJECTION SUBCUTANEOUS at 08:06

## 2022-02-09 RX ADMIN — METOPROLOL TARTRATE 5 MG: 5 INJECTION, SOLUTION INTRAVENOUS at 03:20

## 2022-02-09 NOTE — PAYOR COMM NOTE
"Ref:  [id number]  [facility]  [place],   [phone]  OR  FAX   [phone]         Eddy Ferro (51 y.o. Male)             Date of Birth Social Security Number Address Home Phone MRN    1970  [id number]  [facility] [id number] [phone] [id number]    [employer] Marital Status             Voodoo Single       Admission Date Admission Type Admitting Provider Attending Provider Department, Room/Bed    1/11/22 Urgent [address] Cowan DO  [facility] CARDIAC CARE, C012/1    Discharge Date Discharge Disposition Discharge Destination          2/9/2022 Long Term Care (DC - External)              Attending Provider: (none)   Allergies: No Known Allergies    Isolation: None   Infection: COVID (History) (01/28/22)   Code Status: CPR   Advance Care Planning Activity    Ht: 177.8 cm (70\")   Wt: 89.2 kg (196 lb 10.4 oz)    Admission Cmt: None   Principal Problem: Pneumonia due to COVID-19 virus [U07.1,J12.82]                 Active Insurance as of 1/11/2022     Primary Coverage     Payor Plan Insurance Group Employer/Plan Group     HE[employer] COMMERCIAL HEALTH ALLIANCE COMMERCIAL - PAD ONLY [id number]     Payor Address Payor Phone Number Payor Fax Number Effective Dates    [address]   1/11/2022 - None Entered    [facility] [id number]       Subscriber Name Subscriber Birth Date Member ID       EDDY FERRO 1970 [id number]                 Emergency Contacts      (Rel.) Home Phone Work Phone Mobile Phone    Ayse Reynolds (Significant Other) -- -- [phone]    Carmita Ferro (Daughter) [phone] -- [phone]    Arti Ferro (Sister) [phone] -- --               Discharge Summary      Lane Cowan DO at 02/09/22 0802                ADDENDUM:  Patient was initially set to be discharged as below on 2/7/2022 to [facility].  However prior to calling for transport patient started to have some bright red blood noted in his tracheostomy tube " and around the outside of the tracheostomy tube that was brisk for a few seconds and then suddenly stopped.  He was hemodynamically stable at the time.  No vent changes.  Oxygenating well with sats near 100 on 5 of PEEP and 30% FiO2.  Regardless though due to this transport/discharge was canceled to err on the side of caution.  ENT came and did a bedside in the evening of 2/7 and did a scope with found no evidence of active bleeding. He then subsequently had a CTA of the neck and chest that showed no fistula formation.  Interestingly enough it also showed no PE.  He then subsequently had a trach exchange done in full on 2/8 with another look from neck to sternum again with no issues found.  His hemoglobins have been stable in the mid tens throughout this process with no drop.  Has had no further signs of bleeding.  Potentially could have been a thyroid vessel that was near the tube and briefly blood.  Felt safe at this point in time from ENT standpoint for discharge to LTAC as planned prior.  Of note due to his negative CTA we did obtain lower extremity Dopplers hoping that if negative we could just do DVT prophylaxis Lovenox but unfortunately he had bilateral lower extremity DVTs so he was restarted on full dose Lovenox on 2/8.  Again as noted he has not had any recurrence of bleeding after resuming Lovenox either.  Only other thing to note from the DC summary below is that patient has also had some increased tube feed residuals again and due to this TPN was restarted.  A KUB was done which showed normal/nonspecific bowel gas pattern.  Patient is having BMs.  No signs of obstruction or ileus.  On exam today patient is awake and interactive on Precedex.  Denies any pain.  Lungs are clear.  Abdomen is soft.  No signs of blood in tracheostomy tube or around tube.  PEG tube without drainage.      Again plan to DC to [facility] today.  Will link new imaging and procedure notes directly below this symptoms prior to my  signature.    Procedure Component Value Units Date/Time   US Venous Doppler Lower Extremity Bilateral (duplex) [[id number]] Ari as Reviewed   Order Status: Completed Collected: 02/08/22 1633    Updated: 02/08/22 1637   Narrative:     History: Swelling       Impression:     Impression: There is evidence of deep venous thrombosis in both lower   extremities.       Comments: Bilateral lower extremity venous duplex exam was performed   using color Doppler flow, Doppler waveform analysis, and grayscale   imaging, with and without compression. There is evidence of deep venous   thrombosis in the peroneal and gastrocnemius veins in the right lower   extremity. There is also evidence of deep venous thrombosis in the   gastrocnemius vein in the left lower extremity. No thrombus is   identified in the saphenofemoral junctions and greater saphenous veins   bilaterally.           This report was finalized on 02/08/2022 16:34 by Dr. Deni Paz MD.   XR Abdomen KUB [[id number]] Ari as Reviewed   Order Status: Completed Collected: 02/08/22 1341    Updated: 02/08/22 1354   Narrative:     EXAMINATION: XR ABDOMEN KUB- 2/8/2022 1:41 PM CST       HISTORY: high tube feed residuals, sluggish bowel sounds; A41.9-Sepsis,   unspecified organism; R65.21-Severe sepsis with septic shock;   Z99.11-Dependence on respirator (ventilator) status; U07.1-COVID-19;   J80-Acute respiratory distress syndrome; U07.1-COVID-19;   J12.82-Pneumonia due to coronavirus disease 2019; I26.99-Other pulmonary   embolism without acute cor pulmonale; E43-Unspecified severe   protein-calori.       REPORT: 2 supine views of the abdomen were obtained.       COMPARISON: KUB 2/2/2022.       The percutaneous gastric tube is present and appears to be in   satisfactory position over the body of the stomach. The bowel gas   pattern is within normal limits. No free air is seen on these limited   supine images. The osseous structures are unremarkable. No urinary tract    calculi are identified.       Impression:     The bowel gas pattern is within normal limits. There is a   percutaneous gastric tube which appears to be in satisfactory position,   projecting over the gastric body. Contrast ingestion would be required   to confirm position of the tube tip within the lumen.   This report was finalized on 02/08/2022 13:51 by Dr. Chad Kirby MD.   CT Angiogram Chest [[id number]] Ari as Reviewed   Order Status: Completed Collected: 02/07/22 2235    Updated: 02/07/22 2241   Narrative:     CT ANGIOGRAM CHEST- 2/7/2022 9:02 PM CST       HISTORY: Acute bleed through tracheostomy.  Assess for traneoinnominate   fistula; A41.9-Sepsis, unspecified organism; R65.21-Severe sepsis with   septic shock; Z99.11-Dependence on respirator (ventilator) status;   U07.1-COVID-19; J80-Acute respiratory distress syndrome; U07.1-COVID-19;   J12.82-Pneumonia due to coronavirus disease 2019; I26.99-Other pulmonary   embolism without acute cor pulmonale; H07-Bpxueuhjgt       COMPARISON: None.       DOSE LENGTH PRODUCT: 526 mGy cm. Automated exposure control was also   utilized to decrease patient radiation dose.       TECHNIQUE: Helical tomographic images of the chest were obtained after   the administration of intravenous contrast following angiogram protocol.   Additionally, 3D and multiplanar reformatted images were provided.         FINDINGS:     Pulmonary arteries: There is adequate enhancement of the pulmonary   arteries to evaluate for central and segmental pulmonary emboli. There   are no filling defects within the main, lobar, segmental or visualized   subsegmental pulmonary arteries. The pulmonary arteries are within   normal limits for size.       Aorta and great vessels: The aorta is well opacified and demonstrates no   dissection or aneurysm. The great vessels are normal in appearance.       Visualized neck base: The imaged portion of the base of the neck appears   unremarkable. Tracheostomy  tube is present.       Lungs: Bilateral groundglass pulmonary infiltrates are present. This is   consistent with Covid 19 pneumonia. Small moderate right posterior   pleural effusion is present.. Tracheostomy tube is noted..       Heart: The heart is normal in size. There is no pericardial effusion.       Mediastinum and lymph nodes: No enlarged mediastinal, hilar, or axillary   lymph nodes are present.       Skeletal and soft tissues: The osseous structures of the thorax and   surrounding soft tissues demonstrate no acute process.       Upper abdomen: The imaged portion of the upper abdomen demonstrates no   acute process.       Impression:     1. Bilateral groundglass infiltrates consistent with pneumonia.   2. Tracheostomy tube is satisfactorily position. There is no evidence of   a vascular fistula..           This report was finalized on 02/07/2022 22:38 by Dr. Jerardo Broderick MD.   CT Angiogram Neck [[id number]] Ari as Reviewed   Order Status: Completed Collected: 02/07/22 2231    Updated: 02/07/22 2238   Narrative:     EXAMINATION:   CT ANGIOGRAM NECK-  2/7/2022 10:31 PM CST       HISTORY: CT ANGIOGRAM OF THE NECK 2/7/2022 9:02 PM CST       HISTORY: Acute bleed through tracheostomy.  Assess for traneoinnominate   fistula; A41.9-Sepsis, unspecified organism; R65.21-Severe sepsis with   septic shock; Z99.11-Dependence on respirator (ventilator) status;   U07.1-COVID-19; J80-Acute respiratory distress syndrome; U07.1-COVID-19;   J12.82-Pneumonia due to coronavirus disease 2019; I26.99-Other pulmonary   embolism without acute cor pulmonale; S48-Eogovhcbfp       COMPARISON: None       DLP: 360 mGy cm       In order to have a CT radiation dose as low as reasonably achievable,   Automated Exposure Control was utilized for adjustment of the mA and/or   KV according to patient size.       TECHNIQUE: Following the uneventful administration of Isovue contrast,   serial helical tomographic images of the neck were obtained  following   angiogram protocol. Multiplanar MIP and 3D reconstructions were provided   for review.         FINDINGS:       Angiogram:   The aortic arch and visualized great vessels are patent, without   evidence of aneurysm, dissection, vessel cutoff, or flow limiting   stenosis.       The common carotid, internal carotid and external carotid arteries are   patent bilaterally, without evidence of aneurysm, dissection, vessel   cutoff, or flow-limiting stenosis.       Posteriorly, the vertebral arteries and visualized basilar artery   demonstrate no aneurysm, dissection, vessel cutoff, or flow-limiting   stenosis.       Other findings: Tracheostomy tube is present. There is no evidence of a   vascular communication with a tracheostomy tube.. There is no mass or   significant lymphadenopathy. The lung apices are clear. No acute osseous   abnormality is noted. Fluid is present in the sphenoid air cells..       Impression:     Impression:   1. NASCET criteria were utilized to evaluate stenoses.   2. Unremarkable CT angiogram of the neck.   3. There is no evidence of a vascular fistula with a tracheostomy tube.   4. Fluid in the sphenoid sinuses       This report was finalized on 02/07/2022 22:35 by Dr. Jerardo Broderick MD.   XR Chest 1 View [[id number]] Ari as Reviewed   Order Status: Completed Collected: 02/07/22 1013    Updated: 02/07/22 1018   Narrative:     Frontal upright radiograph of the chest 2/7/2022 9:33 AM CST       HISTORY: Mechanical ventilation       COMPARISON: Chest exam dated 1/25/2022.       FINDINGS:       Tracheostomy is in good position. Known pulmonary infiltrates are much   improved, with only mild residual opacities in the extreme lung bases.   Lungs are well expanded. No pleural effusion. Cardiomediastinal   silhouette and pulmonary vasculature are unremarkable. Left subclavian   central line is in good position. Enteric tube was discontinued.       Impression:     1. Tracheostomy is in good  position. Lungs are well expanded.   2. Underlying pulmonary infiltrates are much improved, with minimal   residual infiltrate in the extreme lung bases.       This report was finalized on 02/07/2022 10:15 by Dr Kory Burks, .       [facility] Otolaryngology Head and Neck Surgery  PROCEDURE NOTE     Anesthesia: none     Indications for Procedure:     Pneumonia due to COVID-19 virus    Acute hypoxemic respiratory failure (HCC)    Acute respiratory distress syndrome (ARDS) due to 2019 novel coronavirus (HCC)    Obesity (BMI 30-39.9)    Acute subsegmental pulmonary embolism without acute cor pulmonale (HCC)    Ventilator dependent (HCC)    Hx of tracheostomy    Severe malnutrition (CMS/HCC)    Cytokine release syndrome, grade 4    Hemoptysis    Surgical complication involving respiratory system        Endoscopy Type: Flexible Tracheobronchoscopy     Indications for Procedure:   Tracheostomy evaluation  Trachea evaluation     Procedure Details:    The patient was placed in the sitting position.  The SCOPE TYPE: Flexible laryngoscope was passed both via the tracheostome and the tracheostomy tube .  The trachea from the subglottis to the rell was examined.  A retrograde examination of the Vocal cords and subglottis was carried out during respiration and phonation.  The following findings were noted:     Findings:   TRACHEO-BRONCHOSCOPY:    Stoma: Old Surgicel present, removed, necrotic debris, no active bleeding, some old blood clot present   Distal Trachea: Intact, no bleeding, no evidence of tracheoinnominate fistula, rell sharp, MSB patent, no bloody secretions   Proximal trachea: Intact, some secretions, no evidence of bleeding   True Vocal cords: Mobile from below, secretions penetrating   Subglottis: Secretions present, no subglottic stenosis      Condition:  Stable.  Patient tolerated procedure well.     Complications:  None     Procedure:  Tracheostomy tube change     Procedure Details:     The patient's respiratory status was assessed.  The trach tube in position was assessed.  The patient was positioned.  The trach tube was removed without difficulty. The stoma and trachea were inspected.    A new tracheostomy tube was re- inserted. Trach tube position was confirmed with scope. Trach tube type:  Shiley trach tube Size  8 Cuffed DIC, flexible     Findings: See above     Condition:  Stable.  Patient tolerated procedure well.     Complications:  None     Post-procedure instructions reviewed with Nursing staff      Electronically signed by Liam Bergman Jr, MD, 02/08/22, 1:23 PM CST.      Discharge date: 2/9/22  Condition on discharge: stable on 5 of peep and 30% fi02 vent via trach  Disposition: [facility] in [place]    Electronically signed by aLne Cowan DO, 02/09/22, 8:12 AM CST.              [facility] Medicine Services  DISCHARGE SUMMARY       Date of Admission: 1/11/2022  Date of Discharge:  2/7/2022  Primary Care Physician: Provider, No Known    Presenting Problem/History of Present Illness:  Acute hypoxemic respiratory failure (HCC) [J96.01]     Final Discharge Diagnoses:  Active Hospital Problems    Diagnosis    • **Pneumonia due to COVID-19 virus    • Cytokine release syndrome, grade 4    • Severe malnutrition (CMS/HCC)    • Hx of tracheostomy    • Ventilator dependent (HCC)    • Acute hypoxemic respiratory failure (HCC)    • Acute respiratory distress syndrome (ARDS) due to 2019 novel coronavirus (HCC)    • Obesity (BMI 30-39.9)    • Acute subsegmental pulmonary embolism without acute cor pulmonale (HCC)        Consults:   #1 Dr. Ricks, pulmonology  #2 Dr. Ortega, ID  #3 Dr. Bergman, ENT  #4 Dr. Wilkinson, GI  #5 Dr. Barillas, Cardiology    Procedures Performed:   #1 R IJ TLC on 1/11   #2 tracheostomy on 1/25  #3 PEG on 1/27    Pertinent Test Results:   XR Chest 1 View [[id number]] Ari as Reviewed   Order Status: Completed Collected: 02/07/22  1013    Updated: 02/07/22 1018   Narrative:     Frontal upright radiograph of the chest 2/7/2022 9:33 AM CST       HISTORY: Mechanical ventilation       COMPARISON: Chest exam dated 1/25/2022.       FINDINGS:       Tracheostomy is in good position. Known pulmonary infiltrates are much   improved, with only mild residual opacities in the extreme lung bases.   Lungs are well expanded. No pleural effusion. Cardiomediastinal   silhouette and pulmonary vasculature are unremarkable. Left subclavian   central line is in good position. Enteric tube was discontinued.       Impression:     1. Tracheostomy is in good position. Lungs are well expanded.   2. Underlying pulmonary infiltrates are much improved, with minimal   residual infiltrate in the extreme lung bases.       This report was finalized on 02/07/2022 10:15 by Dr Kory Burks, .   XR Abdomen KUB [[id number]] Ari as Reviewed   Order Status: Completed Collected: 02/02/22 2102    Updated: 02/02/22 2107   Narrative:     EXAMINATION:  XR ABDOMEN KUB-  2/2/2022 9:01 PM CST       HISTORY: Gastric retention. E43-Unspecified severe protein-calorie   malnutrition.       COMPARISON: 1/29/2022.       TECHNIQUE: Supine abdomen       FINDINGS:   The stomach is not distended. There is air predominantly in   the right colon. No small bowel distention is seen. There is a tube   overlying the stomach that may be a gastric tube. There is a Moss   catheter overlying the lower pelvis.       Impression:     No acute findings.           This report was finalized on 02/02/2022 21:04 by Dr. Andreas Dominguez MD.   XR Abdomen KUB [[id number]] Ari as Reviewed   Order Status: Completed Collected: 01/29/22 0724    Updated: 01/29/22 0729   Narrative:     EXAM: XR ABDOMEN KUB-       INDICATION: High residuals, sepsis       COMPARISON: 1/23/2022       Impression:     FINDING/IMPRESSION:       Nonobstructive bowel gas pattern. No mass effect or suspicious   calcifications. Partially imaged Moss  catheter device. Percutaneous   gastrostomy tip projects over the mid stomach. No acute osseous finding.   This report was finalized on 01/29/2022 07:26 by Dr. Finesse Harley MD.   XR Chest 1 View [[id number]] Ari as Reviewed   Order Status: Completed Collected: 01/25/22 2212    Updated: 01/25/22 2217   Narrative:     Frontal upright radiograph of the chest 1/25/2022 9:44 PM CST       HISTORY: Tracheostomy       COMPARISON: 1/20/2022.       FINDINGS:       New tracheostomy in place. Reidentified bilateral pulmonary infiltrates,   patchy and fairly extensive throughout both lungs. Bibasilar   atelectasis. No pleural effusion or pneumothorax. Enteric tube courses   below the diaphragm with tip beyond the field of view.       Impression:     1. New tracheostomy in place.   2. Bilateral pulmonary infiltrates are very similar.   3. New bibasilar atelectasis.       This report was finalized on 01/25/2022 22:14 by Dr Kory Burks, .   XR Abdomen KUB [[id number]] Ari as Reviewed   Order Status: Completed Collected: 01/23/22 1033    Updated: 01/23/22 1037   Narrative:     HISTORY: High tube feed residuals       KUB: Frontal view the abdomen obtained       COMPARISON: 19 2022       FINDINGS: Enteric tube within the body of the stomach. Nonspecific   nonobstructive bowel gas pattern. No pneumatosis or portal venous air.   No dilated loops of bowel identified. Patchy basilar densities related   to Covid pneumonia.       Impression:     1. Enteric tube tip in the body the stomach. Nonspecific nonobstructive   bowel gas pattern.   This report was finalized on 01/23/2022 10:34 by Dr. Marquita Mac MD.   XR Chest 1 View [[id number]] Ari as Reviewed   Order Status: Completed Collected: 01/20/22 0701    Updated: 01/20/22 0705   Narrative:     Frontal supine radiograph of the chest 1/20/2022 3:05 AM CST       History: on vent, + COVID, f/u lung infiltrates; A41.9-Sepsis,   unspecified organism; R65.21-Severe sepsis with septic  shock       Comparison: 1/16/2022       Findings:   Lines and tubes are stable in position. No new opacities or   pneumothoraces are visualized in the chest. The cardiomediastinal   silhouette and pulmonary vascularity are unchanged.         No acute osseous or soft tissue abnormality is noted.       Impression:     Impression:   1. No significant interval change since previous exam.           This report was finalized on 01/20/2022 07:02 by Dr. Codey Estevez MD.   XR Abdomen KUB [[id number]] Ari as Reviewed   Order Status: Completed Collected: 01/19/22 0906    Updated: 01/19/22 0910   Narrative:     XR ABDOMEN KUB- 1/19/2022 8:53 AM CST       HISTORY: high residual; A41.9-Sepsis, unspecified organism;   R65.21-Severe sepsis with septic shock         COMPARISON: January 16, 2022       FINDINGS:   There is a nonspecific bowel gas pattern. Nasogastric tube is   satisfactorily position in the body the stomach..       Lumbar vertebra normally aligned. SI joints are normal..       Impression:     1. Nonspecific bowel gas pattern. Nasogastric tube is satisfactorily   position.           This report was finalized on 01/19/2022 09:07 by Dr. Jerardo Broderick MD.       Results for orders placed during the hospital encounter of 01/11/22    Adult Transthoracic Echo Limited W/ Cont if Necessary Per Protocol    Interpretation Summary  · Left ventricular ejection fraction appears to be 56 - 60%.  · Abnormal global longitudinal LV strain (GLS) = -7%.  · Normal right ventricular cavity size, wall thickness, systolic function and septal motion noted.  · Estimated right ventricular systolic pressure from tricuspid regurgitation is normal (<35 mmHg).      Chief Complaint on Day of Discharge:   Follow-up PE/respiratory failure/COVID    History of Present Illness on Day of Discharge:   Patient remains status post trach on ventilatory support with some sedation required. Patient has had improved mental status overall but still gets  somewhat tachypneic and anxious with sedation weans. He has been on propofol and Precedex were transitioning to Precedex alone today. Afebrile overnight otherwise no acute issues noted. Unable to really participate in evaluation at this time or give any ROS.    Hospital Course:  The patient is a 51 y.o. male with a prolonged hospital stay. Interested reader can refer to progress notes and charting to feel in any potential Discharge Summary. He Has a history of GERD and hypertension. He presented to an outside hospital with progressive/worsening shortness of breath, fevers, chills, cough. He tested positive for COVID-19 on 12/21 per reports all this was later disputed and thought to be possibly in the early January before testing positive. Regardless he presented to outside hospital on 1/10 and was hypoxic on room air. He was found 3 L nasal cannula with improvement of sats to low nineties. A CTA was done which showed a left subsegmental PE and bilateral groundglass infiltrates. He continued to worsen over the next 24 hours was placed on BiPAP but then subsequently intubated. Post the patient was transferred to our facility on 1/11 in the late evening. At the outside hospital prior to transfer he had received remdesivir, azithromycin, ceftriaxone, Zosyn, baricitinib per reports. He was also started on therapeutic Lovenox. After arrival here antibiotics were continued and he was started on dexamethasone. He was also started on the supportive vitamins. Patient did eventually begin to wean from a respiratory standpoint ventilator requirement started to improve. However he was significantly anxious and tachypneic with any kind of sedation weans. Due to inability to get him off the ventilator he ended up requiring tracheostomy by Dr. Bergman on 1/25. He is also been having some issues with tube feed residuals and was started on Reglan without much improvement. Eventually GI proceeded forward with a PEG placement on 1/27.  "He is still having some residuals of 300 or so but much improved from prior and he was near 900. He did require TPN for some time. Overall doing better from nutrition standpoint. He is still on full dose Lovenox and this can be transitioned to oral anticoagulation when felt appropriate, has been referred here waiting for good GI absorption. Of note only other active issue is patient having runs of possibly SVT initially felt with sedation weans that quickly went back to sinus tachycardia. However he later had some EKG changes that appear different and more like atrial fibrillation. He was seen by cardiology and is currently on an amiodarone taper that started on Friday 2/4 with a plan for 3 times daily amiodarone for 20 doses and then twice daily for 14 days and then daily thereafter starting on 2/25. Again he is already anticoagulated. He is hemodynamically stable. Medically he has been doing fairly well with good blood counts and renal function/electrolytes. Main issue throughout hospital stay really has just been inability to wean from sedation ventilator. Plans are now to discharge the patient to an LTAC where he can complete ventilator/sedation weaning.    Condition on Discharge: Stable on ventilator with 5 of PEEP and 30% FiO2. Satting %    Physical Exam on Discharge:  BP (!) 151/102   Pulse 106   Temp 97.4 °F (36.3 °C)   Resp 25   Ht 177.8 cm (70\")   Wt 92.5 kg (203 lb 14.8 oz)   SpO2 99%   BMI 29.26 kg/m²   Physical Exam  GEN: intubated, sedated, NAD  HEENT:  Atraumatic, Anicteric, Trachea midline, + trach  Lungs: equal chest rise bilaterally, no vent desynchrony   Heart: Increased rate, regular rhythm  ABD: Soft, + PEG, no drainage  Extremities: no cyanosis, no edema  Skin: no obvious rashes or petechiae  Neuro: Unable to assess at this time due to sedation  Psych: Unable to assess at this time due to sedation       Discharge Disposition:  Long Term Care (DC - External)    Discharge " Medications:     Discharge Medications      New Medications      Instructions Start Date   acetaminophen 160 MG/5ML solution  Commonly known as: TYLENOL   650 mg, Per PEG Tube, Every 6 Hours PRN      albuterol (2.5 MG/3ML) 0.083% nebulizer solution  Commonly known as: PROVENTIL   2.5 mg, Nebulization, Every 6 Hours - RT      amiodarone 200 MG tablet  Commonly known as: PACERONE   200 mg, Oral, Every 8 Hours      amiodarone 200 MG tablet  Commonly known as: PACERONE   200 mg, Oral, Every 12 Hours   Start Date: February 11, 2022     amiodarone 200 MG tablet  Commonly known as: PACERONE   200 mg, Oral, Daily   Start Date: February 25, 2022     atorvastatin 20 MG tablet  Commonly known as: LIPITOR   20 mg, Oral, Nightly      bisacodyl 10 MG suppository  Commonly known as: DULCOLAX   10 mg, Rectal, Daily PRN      budesonide-formoterol 160-4.5 MCG/ACT inhaler  Commonly known as: SYMBICORT   2 puffs, Inhalation, 2 Times Daily - RT      dexmedetomidine 400 MCG/100ML solution infusion  Commonly known as: PRECEDEX   0.2-1.5 mcg/kg/hr (19..95 mcg/hr), Intravenous, Titrated      dextromethorphan polistirex ER 30 MG/5ML Suspension Extended Release oral suspension  Commonly known as: DELSYM   60 mg, Per PEG Tube, Every 12 Hours PRN      diphenhydrAMINE 12.5 MG/5ML elixir  Commonly known as: BENADRYL   12.5 mg, Oral, Every 6 Hours Scheduled      enoxaparin 100 MG/ML solution syringe  Commonly known as: LOVENOX   1 mg/kg (90 mg), Subcutaneous, Every 12 Hours      guaiFENesin 100 MG/5ML solution oral solution  Commonly known as: ROBITUSSIN   400 mg, Per PEG Tube, Every 8 Hours Scheduled      hydrocortisone-bacitracin-zinc oxide-nystatin  Commonly known as: MAGIC BARRIER   1 application, Topical, As Needed      LORazepam 2 MG/ML injection  Commonly known as: ATIVAN   1 mg, Intravenous, Every 4 Hours PRN      metoclopramide 5 MG/ML injection  Commonly known as: REGLAN   5 mg, Intravenous, 3 Times Daily      metoprolol tartrate 5  MG/5ML injection  Commonly known as: LOPRESSOR   5 mg, Intravenous, Every 6 Hours      oxyCODONE 5 MG/5ML solution  Commonly known as: ROXICODONE   5 mg, Oral, Every 6 Hours      pantoprazole 40 MG injection  Commonly known as: PROTONIX   40 mg, Intravenous, Every 12 Hours Scheduled      polyethylene glycol 17 g packet  Commonly known as: MIRALAX   17 g, Oral, Daily      sennosides-docusate 8.6-50 MG per tablet  Commonly known as: PERICOLACE   1 tablet, Per PEG Tube, 2 Times Daily      terazosin 1 MG capsule  Commonly known as: HYTRIN   1 mg, Per G Tube, Nightly         Stop These Medications    hydroCHLOROthiazide 12.5 MG tablet  Commonly known as: HYDRODIURIL     lisinopril 40 MG tablet  Commonly known as: PRINIVIL,ZESTRIL     omeprazole 20 MG capsule  Commonly known as: priLOSEC     ROBITUSSIN HONEY COUGH PO            Discharge Diet:    Dietary Orders (From admission, onward)     Start     Ordered    02/07/22 1039  Peptamen AF (Vital AF 1.2)  Diet Effective Now        Comments: When TF bag to be changed, change formula to Peptamen AF, cont to only run at 10mL/hour. Cont with 20mL water flush every hour for tube patency.   Question Answer Comment   Tube Feeding Formula: Peptamen AF (Vital AF 1.2)    Tube Feeding Type Continuous    Continuous Tube Feeding Start Rate (mL/hr) 10    Then Advance Rate By (mL/hr) Do Not Advance    Every __ Hours Patient at Goal Rate    To Goal Rate of (mL/hr) 10    Total Daily Volume to Deliver (mL/day) 220    Water Flush Amount (mL) 20    Water Flush Frequency Every 1 Hour        02/07/22 1039    01/27/22 0001  NPO Diet  Diet Effective Midnight         01/26/22 1131                  Activity at Discharge:    As able    Discharge Care Plan/Instructions:   Discharging to LTAC, f/u as per them when discharged    Follow-up Appointments:   No future appointments.    Test Results Pending at Discharge: none    Electronically signed by Lane Cowan DO, 02/07/22, 15:42 CST.    Time: 40  minutes          Electronically signed by Lane Cowan DO at 02/09/22 0812       Discharge Order (From admission, onward)     Start     Ordered    02/09/22 0804  Discharge patient  Once        Expected Discharge Date: 02/09/22    Discharge Disposition: Long Term Care (DC - External)    Physician of Record for Attribution - Please select from Treatment Team: MARYLU DORAN [[id number]]    Review needed by CMO to determine Physician of Record: No       Question Answer Comment   Physician of Record for Attribution - Please select from Treatment Team MARYLU DORAN    Review needed by CMO to determine Physician of Record No        02/09/22 0804    02/07/22 1536  Discharge patient  Once,   Status:  Canceled        Expected Discharge Date: 02/07/22    Discharge Disposition: Long Term Care (DC - External)    Physician of Record for Attribution - Please select from Treatment Team: MARYLU DORAN [[id number]]    Review needed by CMO to determine Physician of Record: No       Question Answer Comment   Physician of Record for Attribution - Please select from Treatment Team MARYLU DORAN    Review needed by CMO to determine Physician of Record No        02/07/22 1535    02/07/22 1121  Discharge patient  Once,   Status:  Canceled        Expected Discharge Date: 02/07/22    Discharge Disposition: Long Term Care (DC - External)    Physician of Record for Attribution - Please select from Treatment Team: MARYLU DORAN [[id number]]    Review needed by CMO to determine Physician of Record: No       Question Answer Comment   Physician of Record for Attribution - Please select from Treatment Team MARYLU DORAN    Review needed by CMO to determine Physician of Record No        02/07/22 1121

## 2022-02-09 NOTE — PROGRESS NOTES
[facility] Otolaryngology Head and Neck Surgery  INPATIENT PROGRESS NOTE    Patient Name: Eddy Ferro  : 1970   MRN: [id number]  Date of Admission: 2022  Today's Date: 22  Length of Stay: 29  C012/1    Lane Cowan DO   Primary Care Physician: Provider, No Known  Surgical Procedures Since Admission:  Procedure(s):  tracheostomy  laryngoscopy  Surgeon:  Liam Bergman Jr., MD  Status:  15 Days Post-Op  -------------------    Procedure(s):  PERCUTANEOUS ENDOSCOPIC GASTROSTOMY TUBE INSERTION at bedside  Surgeon:  Ino Wilkinson MD  Status:  13 Days Post-Op  -------------------    Subjective   Subjective   Chief Complaint: Trach management  HPI   Accompanied by: No one  Since last examined, Eddy Ferro has remained stable overnight.  He has had no further bleeding he is unable to give history as he cannot vocalize.  He does awaken easily and appears to understand what I say.  Patient is seen, chart is reviewed    Review of Systems   No change from prior inquiry  All pertinent negatives and positives are as above. All other systems have been reviewed and are negative unless otherwise stated.   Objective   Objective   Vitals:   Temp:  [97.6 °F (36.4 °C)-98.9 °F (37.2 °C)] 98.9 °F (37.2 °C)  Heart Rate:  [73-85] 81  Resp:  [26-31] 31  BP: ()/(61-99) 111/99  FiO2 (%):  [30 %] 30 %  Output by Drain (mL) 22 0701 - 22 1900 22 1901 - 22 0700 22 0701 - 22 0706 Range Total   Urethral Catheter 16 Fr.        Physical Exam  Constitutional:       General: He is sleeping. He is not in acute distress.     Appearance: He is well-developed. He is obese. He is ill-appearing.      Interventions: He is intubated.      Comments: Lying in bed, mechanical ventilation, trach in position  Opens eyes and appears to understand   HENT:      Head: Normocephalic and atraumatic.      Right Ear: External ear normal.      Left Ear:  External ear normal.      Nose: Nose normal.      Mouth/Throat:      Lips: Pink.      Mouth: Mucous membranes are dry.   Eyes:      General: Lids are normal. Gaze aligned appropriately. No scleral icterus.     Extraocular Movements: Extraocular movements intact.      Conjunctiva/sclera: Conjunctivae normal.   Neck:      Trachea: Tracheostomy present.      Comments: TRACHEOSTOMY SITE:    Tracheostomy tube type: Shiley #8 cuffed DIC, flex shaft    Stoma: Healing appropriately    Secretions: Minimal, no blood    Voice: Not assessed  Date placed: 1/25/2022  Date last changed: 2/8/2022  Pulmonary:      Effort: No tachypnea, respiratory distress or retractions. He is intubated.      Comments: Mechanical ventilation, trach in position  Lymphadenopathy:      Cervical: No cervical adenopathy.   Neurological:      Mental Status: He is easily aroused.      Comments: Alerts easily   Psychiatric:      Comments: Not evaluated           Result Review    Result Review:  I have personally reviewed the results from the time of this admission to 2/9/2022 07:06 CST and agree with these findings:  [x]  Laboratory  []  Microbiology  []  Radiology  []  EKG/Telemetry   []  Cardiology/Vascular   []  Pathology  [x]  Old records  []  Other:  Most notable findings include: Hemoglobin has remained stable yesterday  Progress Notes by Lane Cowan DO (02/08/2022 08:20)   Hemoglobin & Hematocrit, Blood (02/08/2022 11:34)       Assessment/Plan   Assessment / Plan   Brief Patient Summary:  Eddy Ferro is a 51 y.o. male who has remained on mechanical ventilation, trach in position.  He alerts better this morning.  He has no evidence of bleeding from tracheostomy site.  He has a stable trach in position.    Active Hospital Problems:   Active Hospital Problems    Diagnosis    • **Pneumonia due to COVID-19 virus    • Surgical complication involving respiratory system    • Cytokine release syndrome, grade 4    • Hemoptysis    • Severe malnutrition  (CMS/HCC)    • Hx of tracheostomy    • Ventilator dependent (HCC)    • Acute hypoxemic respiratory failure (HCC)    • Acute respiratory distress syndrome (ARDS) due to 2019 novel coronavirus (HCC)    • Obesity (BMI 30-39.9)    • Acute subsegmental pulmonary embolism without acute cor pulmonale (HCC)      Plan:   Trach management-patient is a stable trach in position.  I will continue trach care.  He has had no further bleeding.  Trach bleeding-no evidence of any further bleeding  Respiratory failure-patient tanvi on mechanical ventilation.  He is followed by the pulmonary service.  Discussed Plan with Dr. Cowan last night  I will follow peripherally. Please call if further consultation needed.    Discharge Planning: Per primary team.  Patient is planned for discharge to [facility] at this point in time.  He is cleared from an ENT standpoint for transfer.  He has no further evidence of bleeding.  I will be happy to see the patient in follow-up in the future.        DVT prophylaxis:  Medical DVT prophylaxis orders are present.     Electronically signed by Liam Bergman Jr, MD, 02/09/22, 7:06 AM CST.

## 2022-02-09 NOTE — CASE MANAGEMENT/SOCIAL WORK
Continued Stay Note   [place]     Patient Name: Eddy Ferro  MRN: [id number]  Today's Date: 2/9/2022    Admit Date: 1/11/2022     Discharge Plan     Row Name 02/09/22 0808       Plan    Plan Comments spoke to Marquita at [facility] all that is needed is a updated DC summary and a Peer to Peer call from Dr Cowan; Dr. Cowan informed; [facility] ambulance called [phone] and they are on standby for DC to [facility]; informed CCU charge nurse Gely               Discharge Codes    No documentation.               Expected Discharge Date and Time     Expected Discharge Date Expected Discharge Time    Feb 9, 2022             Gely Chilel RN

## 2022-02-09 NOTE — PROGRESS NOTES
PULMONARY AND CRITICAL CARE PROGRESS NOTE - [facility]    Patient: Eddy Ferro  1970   MR# [id number]   Acct# [id number]  02/09/22   07:11 CST  Referring Provider: Lane Cowan DO    Chief Complaint: Acute respiratory failure.  Mechanical ventilation.  COVID-19 pneumonia, tracheostomy and PEG placement done.    Interval history: The patient is resting in bed with tracheostomy to mechanical ventilator. He is currently in PSV 10/5, 0.30 FiO2 and sat of 99%. Precedex is infusing. TPN infusing. ENT changed out his trach yesterday to a size 8 cuffed Shiley. Hgb 10.6 and stable. Blood noted in stevenson. He is afebrile. No new ABG or CXR to review. Attending informed me he will be transferred to [facility] today. He is awake and following commands.     Meds:  albuterol, 2.5 mg, Nebulization, Q6H - RT  amiodarone, 200 mg, Oral, Q8H   Followed by  [START ON 2/11/2022] amiodarone, 200 mg, Oral, Q12H   Followed by  [START ON 2/25/2022] amiodarone, 200 mg, Oral, Daily  atorvastatin, 20 mg, Per PEG Tube, Nightly  budesonide-formoterol, 2 puff, Inhalation, BID - RT  diphenhydrAMINE, 12.5 mg, Oral, Q6H  enoxaparin, 1 mg/kg, Subcutaneous, Q12H  guaiFENesin, 400 mg, Per PEG Tube, Q8H  metoclopramide, 5 mg, Intravenous, TID  metoprolol tartrate, 5 mg, Intravenous, Q6H  OLANZapine zydis, 5 mg, Oral, BID  oxyCODONE, 5 mg, Per PEG Tube, Q6H  pantoprazole, 40 mg, Intravenous, Q12H  polyethylene glycol, 17 g, Per PEG Tube, Daily  senna-docusate sodium, 1 tablet, Per PEG Tube, BID  terazosin, 1 mg, Per PEG Tube, Nightly      Adult Custom Central TPN, , Last Rate: 80 mL/hr at 02/08/22 1803  Adult Custom Central TPN, , Last Rate: Stopped (02/08/22 1803)  dexmedetomidine, 0.2-1.5 mcg/kg/hr, Last Rate: 1.3 mcg/kg/hr (02/09/22 0534)      Review of Systems:   Review of Systems   Unable to perform ROS: Intubated     Physical Exam:  SpO2 Percentage    02/09/22 0500 02/09/22 0600 02/09/22 0706   SpO2: 100% 100% 100%      Body mass index is 28.22 kg/m².   Temp:  [97.6 °F (36.4 °C)-98.9 °F (37.2 °C)] 98.9 °F (37.2 °C)  Heart Rate:  [73-85] 83  Resp:  [26-31] 28  BP: ()/(61-99) 111/99  FiO2 (%):  [30 %] 30 %    Intake/Output Summary (Last 24 hours) at 2/9/2022 0711  Last data filed at 2/9/2022 0400  Gross per 24 hour   Intake 2906 ml   Output 1225 ml   Net 1681 ml     Physical Exam  Constitutional:       General: He is sleeping. He is not in acute distress.     Appearance: He is ill-appearing. He is not diaphoretic.   HENT:      Head: Normocephalic.      Nose: Nose normal.      Mouth/Throat:      Mouth: Mucous membranes are moist.   Eyes:      General: No scleral icterus.  Neck:      Comments: Vent to trach  Cardiovascular:      Rate and Rhythm: Normal rate.   Pulmonary:      Effort: Pulmonary effort is normal. No respiratory distress.      Breath sounds: Rhonchi present. No wheezing.   Abdominal:      General: There is no distension.      Comments: PEG   Genitourinary:     Comments: FC  Musculoskeletal:      Right lower leg: No edema.      Left lower leg: No edema.      Comments: SCDs   Skin:     Coloration: Skin is not pale.   Neurological:      Comments: precedex     Electronically signed by SHAHNAZ Hackett, 2/9/2022, 07:11 CST      Physician Substantive Portion: Medical Decision Making:    Laboratory Data:  Results from last 7 days   Lab Units 02/09/22  0309 02/08/22  1134 02/08/22  0313 02/07/22  1934 02/06/22  0629 02/05/22  0300 02/05/22  0300   WBC 10*3/mm3  --   --  9.68  --  8.29  --  7.63   HEMOGLOBIN g/dL 10.2* 10.6* 10.6*   < > 10.9*   < > 10.6*   PLATELETS 10*3/mm3  --   --  235  --  182  --  214    < > = values in this interval not displayed.     Results from last 7 days   Lab Units 02/08/22  0313 02/06/22  0629 02/05/22  0300   SODIUM mmol/L 137 140 138   POTASSIUM mmol/L 3.5 4.0 3.7   BUN mg/dL 25* 15 16   CREATININE mg/dL 0.33* 0.36* 0.34*     Results from last 7 days   Lab Units 02/08/22  4862  02/07/22  0325 02/06/22  0309   PH, ARTERIAL pH units 7.456* 7.471* 7.429   PCO2, ARTERIAL mm Hg 36.4 36.2 40.4   PO2 ART mm Hg 101.0 107.0 102.0   FIO2 % 30 30 30     No results found for: BLOODCX, URINECX, WOUNDCX, MRSACX, RESPCX, STOOLCX  Recent films:  CT Angiogram Neck    Result Date: 2/7/2022  Impression: 1. NASCET criteria were utilized to evaluate stenoses. 2. Unremarkable CT angiogram of the neck. 3. There is no evidence of a vascular fistula with a tracheostomy tube. 4. Fluid in the sphenoid sinuses  This report was finalized on 02/07/2022 22:35 by Dr. Jerardo Broderick MD.    XR Chest 1 View    Result Date: 2/7/2022  1. Tracheostomy is in good position. Lungs are well expanded. 2. Underlying pulmonary infiltrates are much improved, with minimal residual infiltrate in the extreme lung bases.  This report was finalized on 02/07/2022 10:15 by Dr Kory Burks, .    US Venous Doppler Lower Extremity Bilateral (duplex)    Result Date: 2/8/2022  Impression: There is evidence of deep venous thrombosis in both lower extremities.  Comments: Bilateral lower extremity venous duplex exam was performed using color Doppler flow, Doppler waveform analysis, and grayscale imaging, with and without compression. There is evidence of deep venous thrombosis in the peroneal and gastrocnemius veins in the right lower extremity. There is also evidence of deep venous thrombosis in the gastrocnemius vein in the left lower extremity. No thrombus is identified in the saphenofemoral junctions and greater saphenous veins bilaterally.   This report was finalized on 02/08/2022 16:34 by Dr. Deni Paz MD.    CT Angiogram Chest    Result Date: 2/7/2022  1. Bilateral groundglass infiltrates consistent with pneumonia. 2. Tracheostomy tube is satisfactorily position. There is no evidence of a vascular fistula..   This report was finalized on 02/07/2022 22:38 by Dr. Jerardo Broderick MD.    XR Abdomen KUB    Result Date: 2/8/2022  The bowel  gas pattern is within normal limits. There is a percutaneous gastric tube which appears to be in satisfactory position, projecting over the gastric body. Contrast ingestion would be required to confirm position of the tube tip within the lumen. This report was finalized on 02/08/2022 13:51 by Dr. Chad Kirby MD.    Films reviewed personally by me.       · My radiograph interpretation/independent review of imaging: I agree with current interpretation.      Pulmonary Assessment:    1. Acute respiratory failure due to COVID-19   2. On mechanical ventilation failed weaning trials  3. S/p tracheostomy placement   4. Bleeding from tracheostomy site tracheostomy changed.  5. Acute subsegmental pulmonary embolism  6. SARS-COV2 viral pneumonia  7. Status post completion of remdesivir and baricitinib  8. Secondary bacterial infection with prior history of baricitinib therapy  9. Pulmonary embolism stable  10. Ventilator dyssynchrony improved  11. S/p PEG tube placement today.  12. Atrial fibrillation with rapid ventricle response    Recommend/plan:   · Patient was tolerating pressure ventilation very well  · Current ventilator settings reviewed.  He is currently on pressure support ventilation 10/5 with 30% FiO2 with oxygen saturation 99%  · He is on Precedex drips and he feels comfortable and not anxious.  · TPN infusing and the patient is not able to tolerate the tube feeding due to increased residual.  · Patient is afebrile.  No new labs or imaging studies.  No tracheal bleeding noted  · Tracheostomy changed to size 8 Shiley tracheostomy tube cuffed  · He is transferred to the [facility] in [place] today.  · We appreciate the consult from hospitalist team and like to thank them for the referral.      This visit was performed by both a physician and an Advanced Practice RN.  I personally evaluated and examined the patient.  I performed all aspects of the medical decision making as  documented.    Electronically signed by     Florence Ricks MD,  Pulmonologist/intensivist   2/9/2022, 10:53 CST

## (undated) DEVICE — PACK,SET UP,NO DRAPES: Brand: MEDLINE

## (undated) DEVICE — CONMED SCOPE SAVER BITE BLOCK, 20X27 MM: Brand: SCOPE SAVER

## (undated) DEVICE — SYR LUERLOK 5CC

## (undated) DEVICE — CONN FLX BREATHE CIRCT

## (undated) DEVICE — GLV SURG BIOGEL M LTX PF 7 1/2

## (undated) DEVICE — Device

## (undated) DEVICE — NDL HYPO PRECISIONGLIDE REG 25G 1 1/2

## (undated) DEVICE — TOWEL,OR,DSP,ST,BLUE,STD,4/PK,20PK/CS: Brand: MEDLINE

## (undated) DEVICE — KT ANTI FOG W/FLD AND SPNG

## (undated) DEVICE — DRSNG TELFA PAD NONADH STR 1S 3X8IN

## (undated) DEVICE — SYR LL TP 10ML STRL

## (undated) DEVICE — THE CHANNEL CLEANING BRUSH IS A NYLON FLEXI BRUSH ATTACHED TO A FLEXIBLE PLASTIC SHEATH DESIGNED TO SAFELY REMOVE DEBRIS FROM FLEXIBLE ENDOSCOPES.

## (undated) DEVICE — SYR TB PRECISIONGLIDE 1CC 26G 3/8IN LF

## (undated) DEVICE — PK ENT HD AND NK 30

## (undated) DEVICE — BARD PEG SAFETY SYSTEM: Brand: BARD PEG SAFETY SYSTEM

## (undated) DEVICE — TBG FEED PULL FLOW20 NO/DRUG 20F 4.47MM 150CM

## (undated) DEVICE — PROTECT TEETH A/

## (undated) DEVICE — VAGINAL PREP TRAY: Brand: MEDLINE INDUSTRIES, INC.

## (undated) DEVICE — PK TURNOVER RM ADV

## (undated) DEVICE — TUBING, SUCTION, 1/4" X 12', STRAIGHT: Brand: MEDLINE

## (undated) DEVICE — SENSR O2 OXIMAX FNGR A/ 18IN NONSTR

## (undated) DEVICE — CUFF,BP,DISP,1 TUBE,ADULT,HP: Brand: MEDLINE

## (undated) DEVICE — CODMAN® SURGICAL PATTIES 1/2" X1 1/2" (1.27CM X 3.81CM): Brand: CODMAN®

## (undated) DEVICE — GAUZE,SPONGE,4"X4",16PLY,XRAY,STRL,LF: Brand: MEDLINE

## (undated) DEVICE — YANKAUER,BULB TIP WITH VENT: Brand: ARGYLE

## (undated) DEVICE — NDL HYPO ECLPS SFTY 25G 1 1/2IN

## (undated) DEVICE — Device: Brand: DEFENDO AIR/WATER/SUCTION AND BIOPSY VALVE

## (undated) DEVICE — DRSNG TRACH POLYMEM FEN 3.5X3.5IN

## (undated) DEVICE — INTENDED FOR TISSUE SEPARATION, AND OTHER PROCEDURES THAT REQUIRE A SHARP SURGICAL BLADE TO PUNCTURE OR CUT.: Brand: BARD-PARKER ® STAINLESS STEEL BLADES

## (undated) DEVICE — TBG SMPL FLTR LINE NASL 02/C02 A/ BX/100